# Patient Record
Sex: FEMALE | Race: BLACK OR AFRICAN AMERICAN | Employment: UNEMPLOYED | URBAN - METROPOLITAN AREA
[De-identification: names, ages, dates, MRNs, and addresses within clinical notes are randomized per-mention and may not be internally consistent; named-entity substitution may affect disease eponyms.]

---

## 2018-03-23 ENCOUNTER — OFFICE VISIT (OUTPATIENT)
Dept: FAMILY MEDICINE CLINIC | Age: 63
End: 2018-03-23
Payer: COMMERCIAL

## 2018-03-23 VITALS
HEIGHT: 63 IN | DIASTOLIC BLOOD PRESSURE: 92 MMHG | TEMPERATURE: 98.1 F | HEART RATE: 74 BPM | WEIGHT: 154.4 LBS | SYSTOLIC BLOOD PRESSURE: 170 MMHG | BODY MASS INDEX: 27.36 KG/M2 | OXYGEN SATURATION: 99 %

## 2018-03-23 DIAGNOSIS — Z12.31 ENCOUNTER FOR SCREENING MAMMOGRAM FOR BREAST CANCER: ICD-10-CM

## 2018-03-23 DIAGNOSIS — I10 ESSENTIAL (PRIMARY) HYPERTENSION: Primary | ICD-10-CM

## 2018-03-23 PROCEDURE — 99213 OFFICE O/P EST LOW 20 MIN: CPT | Performed by: PHYSICIAN ASSISTANT

## 2018-03-23 RX ORDER — CLONIDINE HYDROCHLORIDE 0.1 MG/1
0.1 TABLET ORAL ONCE
Status: COMPLETED | OUTPATIENT
Start: 2018-03-23 | End: 2018-03-23

## 2018-03-23 RX ORDER — CLONIDINE HYDROCHLORIDE 0.1 MG/1
0.1 TABLET ORAL ONCE
Status: DISCONTINUED | OUTPATIENT
Start: 2018-03-23 | End: 2018-03-23

## 2018-03-23 RX ORDER — MEDICAL SUPPLY, MISCELLANEOUS
1 EACH MISCELLANEOUS 2 TIMES DAILY
Qty: 1 EACH | Refills: 0 | Status: SHIPPED | OUTPATIENT
Start: 2018-03-23 | End: 2018-05-15

## 2018-03-23 RX ORDER — LOSARTAN POTASSIUM 25 MG/1
25 TABLET ORAL DAILY
Qty: 30 TABLET | Refills: 5 | Status: SHIPPED | OUTPATIENT
Start: 2018-03-23 | End: 2018-04-16

## 2018-03-23 RX ORDER — HYDROCHLOROTHIAZIDE 25 MG/1
25 TABLET ORAL DAILY
Qty: 30 TABLET | Refills: 3 | Status: SHIPPED | OUTPATIENT
Start: 2018-03-23 | End: 2018-08-04 | Stop reason: SDUPTHER

## 2018-03-23 RX ADMIN — CLONIDINE HYDROCHLORIDE 0.1 MG: 0.1 TABLET ORAL at 08:35

## 2018-03-23 RX ADMIN — CLONIDINE HYDROCHLORIDE 0.1 MG: 0.1 TABLET ORAL at 09:03

## 2018-03-23 NOTE — PROGRESS NOTES
[employer]  [address]   [phone]      Wolm Forth  1955  3/23/18      HPI:  Patient presents for HTN f/u. She takes losartan, but it has been more than 24 hrs since last dose. She usually works midnights, comes home at 8 am and takes her med, then goes to sleep. Sometimes she notices \"a pulse in my ears\" or mild headache. She denies sob or cp. She often has mild le edema. We reviewed her most recent labs and ekg. She clearly didn't understand anything about Htn and we spent about 10-15 minutes explaining this to her. Past Medical History:   Diagnosis Date    HTN (hypertension)         Past Surgical History:   Procedure Laterality Date    CHOLECYSTECTOMY  2012    HEMORRHOID SURGERY      PARTIAL HYSTERECTOMY  2001    TONSILLECTOMY      patient was 11years old       Current Outpatient Prescriptions   Medication Sig Dispense Refill    hydrochlorothiazide (HYDRODIURIL) 25 MG tablet Take 1 tablet by mouth daily 30 tablet 3    losartan (COZAAR) 25 MG tablet Take 1 tablet by mouth daily 30 tablet 5     No current facility-administered medications for this visit.         Allergies   Allergen Reactions    Morphine      Patient states she had a panic attack with this       Family History   Problem Relation Age of Onset    No Known Problems Mother      mother was murdered, she doesn't know much history    Lung Cancer Father      and throat ca    Other Sister      afixiated on vomit    No Known Problems Brother     No Known Problems Maternal Grandmother     No Known Problems Maternal Grandfather     No Known Problems Paternal Grandmother     No Known Problems Paternal Grandfather     COPD Sister     No Known Problems Sister     No Known Problems Brother     No Known Problems Son     No Known Problems Son     No Known Problems Daughter     No Known Problems Daughter     No Known Problems Daughter        Social History     Social History    of motion. Neck supple. No thyromegaly present. Cardiovascular: Normal rate, regular rhythm, normal heart sounds and intact distal pulses. No murmur heard. No le edema   Pulmonary/Chest: Effort normal and breath sounds normal. No accessory muscle usage. No respiratory distress. She has no wheezes. Musculoskeletal: Normal range of motion. Neurological: She is alert and oriented to person, place, and time. Skin: Skin is warm and dry. No rash noted. Psychiatric: She has a normal mood and affect. Her speech is normal and behavior is normal.         Assessment/Plan:     Rebekah Merlin was seen today for hypertension. Diagnoses and all orders for this visit:    Essential (primary) hypertension  -     cloNIDine (CATAPRES) tablet 0.1 mg; Take 1 tablet by mouth once  -     hydrochlorothiazide (HYDRODIURIL) 25 MG tablet; Take 1 tablet by mouth daily    Encounter for screening mammogram for breast cancer  -     [facility] Digital Screen Bilateral [[id number]]; Future    Screening for colon cancer  -     POCT FECAL IMMUNOCHEMICAL TEST (FIT); Future        Return in about 3 days (around 3/26/2018) for htn. we discussed HTN in depth. She will change her med schedule for better coverage when she is awake. Clonidine brought BP down somewhat. She is asymptomatic.      CIARA Rivera

## 2018-04-16 ENCOUNTER — OFFICE VISIT (OUTPATIENT)
Dept: FAMILY MEDICINE CLINIC | Age: 63
End: 2018-04-16
Payer: COMMERCIAL

## 2018-04-16 VITALS
WEIGHT: 155 LBS | DIASTOLIC BLOOD PRESSURE: 98 MMHG | RESPIRATION RATE: 16 BRPM | OXYGEN SATURATION: 94 % | TEMPERATURE: 98 F | HEIGHT: 63 IN | HEART RATE: 81 BPM | BODY MASS INDEX: 27.46 KG/M2 | SYSTOLIC BLOOD PRESSURE: 146 MMHG

## 2018-04-16 DIAGNOSIS — I10 ESSENTIAL (PRIMARY) HYPERTENSION: Primary | ICD-10-CM

## 2018-04-16 PROCEDURE — 99213 OFFICE O/P EST LOW 20 MIN: CPT | Performed by: PHYSICIAN ASSISTANT

## 2018-04-16 RX ORDER — LOSARTAN POTASSIUM 50 MG/1
50 TABLET ORAL DAILY
Qty: 30 TABLET | Refills: 3 | Status: SHIPPED | OUTPATIENT
Start: 2018-04-16 | End: 2018-05-15

## 2018-05-15 ENCOUNTER — OFFICE VISIT (OUTPATIENT)
Dept: FAMILY MEDICINE CLINIC | Age: 63
End: 2018-05-15
Payer: COMMERCIAL

## 2018-05-15 VITALS
WEIGHT: 154 LBS | HEIGHT: 63 IN | RESPIRATION RATE: 16 BRPM | HEART RATE: 84 BPM | BODY MASS INDEX: 27.29 KG/M2 | DIASTOLIC BLOOD PRESSURE: 88 MMHG | SYSTOLIC BLOOD PRESSURE: 160 MMHG

## 2018-05-15 DIAGNOSIS — Z12.39 BREAST CANCER SCREENING: ICD-10-CM

## 2018-05-15 DIAGNOSIS — I10 ESSENTIAL (PRIMARY) HYPERTENSION: Primary | ICD-10-CM

## 2018-05-15 DIAGNOSIS — Z12.11 COLON CANCER SCREENING: ICD-10-CM

## 2018-05-15 PROCEDURE — 99213 OFFICE O/P EST LOW 20 MIN: CPT | Performed by: PHYSICIAN ASSISTANT

## 2018-05-15 RX ORDER — LOSARTAN POTASSIUM 100 MG/1
100 TABLET ORAL DAILY
Qty: 30 TABLET | Refills: 3 | Status: SHIPPED | OUTPATIENT
Start: 2018-05-15 | End: 2018-09-13 | Stop reason: SDUPTHER

## 2018-08-04 DIAGNOSIS — I10 ESSENTIAL (PRIMARY) HYPERTENSION: ICD-10-CM

## 2018-08-07 RX ORDER — HYDROCHLOROTHIAZIDE 25 MG/1
25 TABLET ORAL DAILY
Qty: 30 TABLET | Refills: 0 | Status: SHIPPED | OUTPATIENT
Start: 2018-08-07 | End: 2018-09-18 | Stop reason: SDUPTHER

## 2018-09-18 ENCOUNTER — OFFICE VISIT (OUTPATIENT)
Dept: FAMILY MEDICINE CLINIC | Age: 63
End: 2018-09-18
Payer: COMMERCIAL

## 2018-09-18 VITALS
HEIGHT: 63 IN | SYSTOLIC BLOOD PRESSURE: 130 MMHG | HEART RATE: 82 BPM | BODY MASS INDEX: 26.79 KG/M2 | WEIGHT: 151.2 LBS | TEMPERATURE: 98.4 F | OXYGEN SATURATION: 98 % | DIASTOLIC BLOOD PRESSURE: 86 MMHG

## 2018-09-18 DIAGNOSIS — Z12.11 COLON CANCER SCREENING: ICD-10-CM

## 2018-09-18 DIAGNOSIS — H53.9 VISION CHANGES: ICD-10-CM

## 2018-09-18 DIAGNOSIS — Z12.31 ENCOUNTER FOR SCREENING MAMMOGRAM FOR BREAST CANCER: ICD-10-CM

## 2018-09-18 DIAGNOSIS — R76.8 HEPATITIS C ANTIBODY POSITIVE IN BLOOD: ICD-10-CM

## 2018-09-18 DIAGNOSIS — R74.8 ELEVATED LIVER ENZYMES: ICD-10-CM

## 2018-09-18 DIAGNOSIS — I10 ESSENTIAL (PRIMARY) HYPERTENSION: Primary | ICD-10-CM

## 2018-09-18 PROCEDURE — 99214 OFFICE O/P EST MOD 30 MIN: CPT | Performed by: PHYSICIAN ASSISTANT

## 2018-09-18 RX ORDER — LOSARTAN POTASSIUM 100 MG/1
100 TABLET ORAL DAILY
Qty: 30 TABLET | Refills: 5 | Status: SHIPPED | OUTPATIENT
Start: 2018-09-18 | End: 2018-10-24 | Stop reason: SDUPTHER

## 2018-09-18 RX ORDER — HYDROCHLOROTHIAZIDE 25 MG/1
25 TABLET ORAL DAILY
Qty: 30 TABLET | Refills: 5 | Status: SHIPPED | OUTPATIENT
Start: 2018-09-18 | End: 2018-10-24 | Stop reason: SDUPTHER

## 2018-09-18 ASSESSMENT — PATIENT HEALTH QUESTIONNAIRE - PHQ9
2. FEELING DOWN, DEPRESSED OR HOPELESS: 0
1. LITTLE INTEREST OR PLEASURE IN DOING THINGS: 0
SUM OF ALL RESPONSES TO PHQ QUESTIONS 1-9: 0
SUM OF ALL RESPONSES TO PHQ QUESTIONS 1-9: 0
SUM OF ALL RESPONSES TO PHQ9 QUESTIONS 1 & 2: 0

## 2018-09-18 NOTE — PROGRESS NOTES
[employer]  [address]   [phone]      Zach Palomino  1955 9/18/18      HPI:  Patient presents for htn f/u. She says \"i know I should have been here a long time ago. \" she is out of her hctz. She is having problems with blurry vision and asks to see an eye specialist. She has no hx of diabetes, but she has had uncontrolled htn for a long time. When reviewing her labs, she had a positive hep C last year. She has never seen a GI specialist. She complains about extra stress bc her daughter and kids are living with her bc of financial hardship. She only has a 1 bedroom apartment and this has been a tough living arrangement for her. She has been working extra, and this is why she hasnt done her screening exams. 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 Past Medical History:   Diagnosis Date    HTN (hypertension)         Past Surgical History:   Procedure Laterality Date    CHOLECYSTECTOMY  2012    HEMORRHOID SURGERY      PARTIAL HYSTERECTOMY  2001    TONSILLECTOMY      patient was 11years old       Current Outpatient Prescriptions   Medication Sig Dispense Refill    hydrochlorothiazide (HYDRODIURIL) 25 MG tablet Take 1 tablet by mouth daily 30 tablet 5    losartan (COZAAR) 100 MG tablet Take 1 tablet by mouth daily 30 tablet 5     No current facility-administered medications for this visit.         Allergies   Allergen Reactions    Morphine      Patient states she had a panic attack with this       Family History   Problem Relation Age of Onset    No Known Problems Mother         mother was murdered, she doesn't know much history    Lung Cancer Father         and throat ca    Other Sister         afixiated on vomit    No Known Problems Brother     No Known Problems Maternal Grandmother     No Known Problems Maternal Grandfather     No Known Problems Paternal Grandmother     No Known Problems Paternal Grandfather     COPD Sister     No Known hydrochlorothiazide (HYDRODIURIL) 25 MG tablet; Take 1 tablet by mouth daily  -     losartan (COZAAR) 100 MG tablet; Take 1 tablet by mouth daily  -     COMPREHENSIVE METABOLIC PANEL; Future  -     CBC; Future    Hepatitis C antibody positive in blood  -     Hep C Viral Load; Future  -     HEPATITIS C GENOTYPE; Future  -     [facility] DO ([place])    Elevated liver enzymes  -     [facility] ([place])    Vision changes  -     HEMOGLOBIN A1C; Future  -     Simon Prakash MD ([place])    Encounter for screening mammogram for breast cancer  -     BETSY DIGITAL SCREEN W CAD BILATERAL; Future    Colon cancer screening  -     POCT Fit Test; Future        Return in about 3 months (around 12/18/2018) for htn. htn is controlled on meds. Refills done today. Will repeat labs. Will get set up with GI for tx of Hep C and elevated LFTS. Will check glucose. I agree, she needs an eye exam. Will send referral. Trial of setting up screening exams again. Discussed importance and advised continued home breast exams as well.      CIARA Vasques

## 2018-10-24 RX ORDER — HYDROCHLOROTHIAZIDE 25 MG/1
25 TABLET ORAL DAILY
Qty: 30 TABLET | Refills: 5 | Status: SHIPPED | OUTPATIENT
Start: 2018-10-24 | End: 2019-05-03 | Stop reason: SDUPTHER

## 2018-10-24 RX ORDER — LOSARTAN POTASSIUM 100 MG/1
100 TABLET ORAL DAILY
Qty: 30 TABLET | Refills: 5 | Status: SHIPPED | OUTPATIENT
Start: 2018-10-24 | End: 2019-05-13 | Stop reason: SDUPTHER

## 2018-10-24 NOTE — TELEPHONE ENCOUNTER
Refills sent. Patient is due for colon cancer screening and mammogram.  Labs were also ordered at her last visit that have not yet been completed. Please encourage her to get this stuff done.

## 2018-10-25 ENCOUNTER — TELEPHONE (OUTPATIENT)
Dept: FAMILY MEDICINE CLINIC | Age: 63
End: 2018-10-25

## 2019-05-03 DIAGNOSIS — I10 ESSENTIAL (PRIMARY) HYPERTENSION: ICD-10-CM

## 2019-05-03 RX ORDER — HYDROCHLOROTHIAZIDE 25 MG/1
25 TABLET ORAL DAILY
Qty: 30 TABLET | Refills: 0 | Status: SHIPPED | OUTPATIENT
Start: 2019-05-03 | End: 2019-06-18 | Stop reason: SDUPTHER

## 2019-05-13 DIAGNOSIS — I10 ESSENTIAL (PRIMARY) HYPERTENSION: ICD-10-CM

## 2019-05-14 RX ORDER — LOSARTAN POTASSIUM 100 MG/1
100 TABLET ORAL DAILY
Qty: 30 TABLET | Refills: 0 | Status: SHIPPED | OUTPATIENT
Start: 2019-05-14 | End: 2019-06-18 | Stop reason: SDUPTHER

## 2019-06-18 ENCOUNTER — OFFICE VISIT (OUTPATIENT)
Dept: FAMILY MEDICINE CLINIC | Age: 64
End: 2019-06-18
Payer: COMMERCIAL

## 2019-06-18 ENCOUNTER — HOSPITAL ENCOUNTER (OUTPATIENT)
Age: 64
Discharge: HOME | End: 2019-06-20
Payer: COMMERCIAL

## 2019-06-18 VITALS
HEART RATE: 84 BPM | DIASTOLIC BLOOD PRESSURE: 76 MMHG | BODY MASS INDEX: 26.22 KG/M2 | RESPIRATION RATE: 18 BRPM | HEIGHT: 63 IN | OXYGEN SATURATION: 98 % | TEMPERATURE: 98 F | WEIGHT: 148 LBS | SYSTOLIC BLOOD PRESSURE: 122 MMHG

## 2019-06-18 DIAGNOSIS — I10 ESSENTIAL (PRIMARY) HYPERTENSION: ICD-10-CM

## 2019-06-18 DIAGNOSIS — I10 ESSENTIAL (PRIMARY) HYPERTENSION: Primary | ICD-10-CM

## 2019-06-18 DIAGNOSIS — Z12.39 BREAST CANCER SCREENING: ICD-10-CM

## 2019-06-18 DIAGNOSIS — R74.8 ELEVATED LIVER ENZYMES: ICD-10-CM

## 2019-06-18 DIAGNOSIS — R76.8 HEPATITIS C ANTIBODY POSITIVE IN BLOOD: ICD-10-CM

## 2019-06-18 DIAGNOSIS — Z12.11 COLON CANCER SCREENING: ICD-10-CM

## 2019-06-18 DIAGNOSIS — Z12.31 ENCOUNTER FOR SCREENING MAMMOGRAM FOR BREAST CANCER: ICD-10-CM

## 2019-06-18 PROCEDURE — 87522 HEPATITIS C REVRS TRNSCRPJ: CPT

## 2019-06-18 PROCEDURE — 99214 OFFICE O/P EST MOD 30 MIN: CPT | Performed by: PHYSICIAN ASSISTANT

## 2019-06-18 PROCEDURE — 80053 COMPREHEN METABOLIC PANEL: CPT

## 2019-06-18 PROCEDURE — 85025 COMPLETE CBC W/AUTO DIFF WBC: CPT

## 2019-06-18 PROCEDURE — 80061 LIPID PANEL: CPT

## 2019-06-18 RX ORDER — HYDROCHLOROTHIAZIDE 25 MG/1
25 TABLET ORAL DAILY
Qty: 30 TABLET | Refills: 3 | Status: ON HOLD | OUTPATIENT
Start: 2019-06-18 | End: 2019-09-13 | Stop reason: HOSPADM

## 2019-06-18 RX ORDER — LOSARTAN POTASSIUM 100 MG/1
100 TABLET ORAL DAILY
Qty: 30 TABLET | Refills: 3 | Status: SHIPPED | OUTPATIENT
Start: 2019-06-18 | End: 2019-11-12 | Stop reason: SDUPTHER

## 2019-06-18 ASSESSMENT — PATIENT HEALTH QUESTIONNAIRE - PHQ9
SUM OF ALL RESPONSES TO PHQ QUESTIONS 1-9: 0
1. LITTLE INTEREST OR PLEASURE IN DOING THINGS: 0
2. FEELING DOWN, DEPRESSED OR HOPELESS: 0
SUM OF ALL RESPONSES TO PHQ9 QUESTIONS 1 & 2: 0
SUM OF ALL RESPONSES TO PHQ QUESTIONS 1-9: 0

## 2019-06-18 NOTE — PROGRESS NOTES
Social History     Socioeconomic History    Marital status: Single     Spouse name: Not on file    Number of children: 11    Years of education: Not on file    Highest education level: Not on file   Occupational History    Not on file   Social Needs    Financial resource strain: Not on file    Food insecurity:     Worry: Not on file     Inability: Not on file    Transportation needs:     Medical: Not on file     Non-medical: Not on file   Tobacco Use    Smoking status: Current Every Day Smoker     Packs/day: 0.25     Years: 5.00     Pack years: 1.25    Smokeless tobacco: Never Used   Substance and Sexual Activity    Alcohol use: Yes     Comment: occasional    Drug use: No    Sexual activity: Never   Lifestyle    Physical activity:     Days per week: Not on file     Minutes per session: Not on file    Stress: Not on file   Relationships    Social connections:     Talks on phone: Not on file     Gets together: Not on file     Attends [employer] service: Not on file     Active member of club or organization: Not on file     Attends meetings of clubs or organizations: Not on file     Relationship status: Not on file    Intimate partner violence:     Fear of current or ex partner: Not on file     Emotionally abused: Not on file     Physically abused: Not on file     Forced sexual activity: Not on file   Other Topics Concern    Not on file   Social History Narrative    Not on file       Review of Systems :  Constitutional: negative for - chills, fever, weight loss, or fatigue  Psychological: negative for - anxiety, depression or suicidal ideation  HEENT: negative for - vision changes, nasal congestion, ear pain or pharyngitis   Respiratory: negative for -  Chest heaviness, cough, shortness of breath, or pleuritic pain  Cardiovascular: negative for - diaphoresis, chest pain, palpitations, or edema   Gastrointestinal: negative for -abdominal pain, change in bowel habits, constipation, diarrhea, or HEPATITIS C GENOTYPE; Future  -     HEP C VIRAL LOAD; Future  -     Ulisses Edwards DO, [place] ([place])    Elevated liver enzymes  -     Ulisses Edwards DO, [facility] ([facility])    Encounter for screening mammogram for breast cancer    Colon cancer screening  -     Cancel: POCT Fit Test; Future  -     POCT Fit Test; Future    Breast cancer screening  -     BETSY DIGITAL SCREEN W CAD BILATERAL; Future        Return in about 3 months (around 9/18/2019). htn is stable. Will obtain labs. Unfortunately she was not seen by GI yet for the hep c and elevated lfts. Will repeat labs. She does not wish to do a colonoscopy but will do a fit test. She promises to do her mammogram at the end of July and schedule a pap.      CIARA Sorensen

## 2019-06-19 LAB
ALBUMIN SERPL-MCNC: 3.9 G/DL (ref 3.5–5.2)
ALP BLD-CCNC: 60 U/L (ref 35–104)
ALT SERPL-CCNC: 106 U/L (ref 0–32)
ANION GAP SERPL CALCULATED.3IONS-SCNC: 11 MMOL/L (ref 7–16)
AST SERPL-CCNC: 122 U/L (ref 0–31)
BASOPHILS ABSOLUTE: 0.01 E9/L (ref 0–0.2)
BASOPHILS RELATIVE PERCENT: 0.3 % (ref 0–2)
BILIRUB SERPL-MCNC: 1.4 MG/DL (ref 0–1.2)
BUN BLDV-MCNC: 16 MG/DL (ref 8–23)
CALCIUM SERPL-MCNC: 11 MG/DL (ref 8.6–10.2)
CHLORIDE BLD-SCNC: 105 MMOL/L (ref 98–107)
CHOLESTEROL, TOTAL: 121 MG/DL (ref 0–199)
CO2: 27 MMOL/L (ref 22–29)
CREAT SERPL-MCNC: 1 MG/DL (ref 0.5–1)
EOSINOPHILS ABSOLUTE: 0.1 E9/L (ref 0.05–0.5)
EOSINOPHILS RELATIVE PERCENT: 2.8 % (ref 0–6)
GFR AFRICAN AMERICAN: >60
GFR NON-AFRICAN AMERICAN: >60 ML/MIN/1.73
GLUCOSE BLD-MCNC: 119 MG/DL (ref 74–99)
HCT VFR BLD CALC: 41.8 % (ref 34–48)
HDLC SERPL-MCNC: 44 MG/DL
HEMOGLOBIN: 14 G/DL (ref 11.5–15.5)
IMMATURE GRANULOCYTES #: 0.02 E9/L
IMMATURE GRANULOCYTES %: 0.6 % (ref 0–5)
LDL CHOLESTEROL CALCULATED: 56 MG/DL (ref 0–99)
LYMPHOCYTES ABSOLUTE: 1.4 E9/L (ref 1.5–4)
LYMPHOCYTES RELATIVE PERCENT: 39.3 % (ref 20–42)
MCH RBC QN AUTO: 33.3 PG (ref 26–35)
MCHC RBC AUTO-ENTMCNC: 33.5 % (ref 32–34.5)
MCV RBC AUTO: 99.5 FL (ref 80–99.9)
MONOCYTES ABSOLUTE: 0.34 E9/L (ref 0.1–0.95)
MONOCYTES RELATIVE PERCENT: 9.6 % (ref 2–12)
NEUTROPHILS ABSOLUTE: 1.69 E9/L (ref 1.8–7.3)
NEUTROPHILS RELATIVE PERCENT: 47.4 % (ref 43–80)
PDW BLD-RTO: 12.6 FL (ref 11.5–15)
PLATELET # BLD: 90 E9/L (ref 130–450)
PLATELET CONFIRMATION: NORMAL
PMV BLD AUTO: 12.9 FL (ref 7–12)
POTASSIUM SERPL-SCNC: 3.8 MMOL/L (ref 3.5–5)
RBC # BLD: 4.2 E12/L (ref 3.5–5.5)
SODIUM BLD-SCNC: 143 MMOL/L (ref 132–146)
TOTAL PROTEIN: 7.3 G/DL (ref 6.4–8.3)
TRIGL SERPL-MCNC: 104 MG/DL (ref 0–149)
VLDLC SERPL CALC-MCNC: 21 MG/DL
WBC # BLD: 3.6 E9/L (ref 4.5–11.5)

## 2019-06-22 LAB
HCV QNT BY NAAT IU/ML: ABNORMAL IU/ML
HCV QNT BY NAAT LOG IU/ML: 7 LOG IU/ML
INTERPRETATION: DETECTED

## 2019-06-25 LAB — HEPATITIS C GENOTYPE: NORMAL

## 2019-06-27 ENCOUNTER — TELEPHONE (OUTPATIENT)
Dept: FAMILY MEDICINE CLINIC | Age: 64
End: 2019-06-27

## 2019-06-27 DIAGNOSIS — R79.89 ABNORMAL CBC: Primary | ICD-10-CM

## 2019-06-27 NOTE — TELEPHONE ENCOUNTER
Patient came in because on 6/19/19 we called her regarding blood work and her platelets and white blood cell counts were low and dayo wanted to have them rechecked in one week can you place the order please

## 2019-06-28 ENCOUNTER — HOSPITAL ENCOUNTER (OUTPATIENT)
Age: 64
Discharge: HOME | End: 2019-06-30
Payer: COMMERCIAL

## 2019-06-28 ENCOUNTER — NURSE ONLY (OUTPATIENT)
Dept: FAMILY MEDICINE CLINIC | Age: 64
End: 2019-06-28

## 2019-06-28 DIAGNOSIS — R79.89 ABNORMAL CBC: ICD-10-CM

## 2019-06-28 LAB
ALBUMIN SERPL-MCNC: 4 G/DL (ref 3.5–5.2)
ALP BLD-CCNC: 75 U/L (ref 35–104)
ALT SERPL-CCNC: 121 U/L (ref 0–32)
AST SERPL-CCNC: 132 U/L (ref 0–31)
BASOPHILS ABSOLUTE: 0.01 E9/L (ref 0–0.2)
BASOPHILS RELATIVE PERCENT: 0.2 % (ref 0–2)
BILIRUB SERPL-MCNC: 1 MG/DL (ref 0–1.2)
BILIRUBIN DIRECT: 0.5 MG/DL (ref 0–0.3)
BILIRUBIN, INDIRECT: 0.5 MG/DL (ref 0–1)
EOSINOPHILS ABSOLUTE: 0.14 E9/L (ref 0.05–0.5)
EOSINOPHILS RELATIVE PERCENT: 3.4 % (ref 0–6)
HCT VFR BLD CALC: 41.4 % (ref 34–48)
HEMOGLOBIN: 14.7 G/DL (ref 11.5–15.5)
IMMATURE GRANULOCYTES #: 0.02 E9/L
IMMATURE GRANULOCYTES %: 0.5 % (ref 0–5)
INR BLD: 1.1
LYMPHOCYTES ABSOLUTE: 1.73 E9/L (ref 1.5–4)
LYMPHOCYTES RELATIVE PERCENT: 42.5 % (ref 20–42)
MCH RBC QN AUTO: 33.6 PG (ref 26–35)
MCHC RBC AUTO-ENTMCNC: 35.5 % (ref 32–34.5)
MCV RBC AUTO: 94.5 FL (ref 80–99.9)
MONOCYTES ABSOLUTE: 0.45 E9/L (ref 0.1–0.95)
MONOCYTES RELATIVE PERCENT: 11.1 % (ref 2–12)
NEUTROPHILS ABSOLUTE: 1.72 E9/L (ref 1.8–7.3)
NEUTROPHILS RELATIVE PERCENT: 42.3 % (ref 43–80)
PDW BLD-RTO: 12.5 FL (ref 11.5–15)
PLATELET # BLD: 90 E9/L (ref 130–450)
PLATELET CONFIRMATION: NORMAL
PMV BLD AUTO: 13.1 FL (ref 7–12)
PROTHROMBIN TIME: 13 SEC (ref 9.3–12.4)
RBC # BLD: 4.38 E12/L (ref 3.5–5.5)
RBC # BLD: NORMAL 10*6/UL
TOTAL PROTEIN: 7.3 G/DL (ref 6.4–8.3)
WBC # BLD: 4.1 E9/L (ref 4.5–11.5)

## 2019-06-28 PROCEDURE — 81596 NFCT DS CHRNC HCV 6 ASSAYS: CPT

## 2019-06-28 PROCEDURE — 86704 HEP B CORE ANTIBODY TOTAL: CPT

## 2019-06-28 PROCEDURE — 85610 PROTHROMBIN TIME: CPT

## 2019-06-28 PROCEDURE — 87340 HEPATITIS B SURFACE AG IA: CPT

## 2019-06-28 PROCEDURE — 80076 HEPATIC FUNCTION PANEL: CPT

## 2019-06-28 PROCEDURE — 86706 HEP B SURFACE ANTIBODY: CPT

## 2019-06-28 PROCEDURE — 86703 HIV-1/HIV-2 1 RESULT ANTBDY: CPT

## 2019-06-28 PROCEDURE — 85025 COMPLETE CBC W/AUTO DIFF WBC: CPT

## 2019-06-28 PROCEDURE — 87522 HEPATITIS C REVRS TRNSCRPJ: CPT

## 2019-06-28 PROCEDURE — 86803 HEPATITIS C AB TEST: CPT

## 2019-07-01 LAB
HBV SURFACE AB TITR SER: NORMAL {TITER}
HEPATITIS B SURFACE ANTIGEN INTERPRETATION: NORMAL
HEPATITIS C ANTIBODY INTERPRETATION: REACTIVE
HIV-1 AND HIV-2 ANTIBODIES: NORMAL

## 2019-07-03 LAB — HEPATITIS B CORE TOTAL ANTIBODY: REACTIVE

## 2019-07-12 ENCOUNTER — TELEPHONE (OUTPATIENT)
Dept: FAMILY MEDICINE CLINIC | Age: 64
End: 2019-07-12

## 2019-07-17 LAB
Lab: NORMAL
REPORT: NORMAL
THIS TEST SENT TO: NORMAL

## 2019-07-18 ENCOUNTER — HOSPITAL ENCOUNTER (OUTPATIENT)
Age: 64
Discharge: HOME | End: 2019-07-18
Payer: COMMERCIAL

## 2019-07-18 LAB
ALCOHOL URINE: NOT DETECTED MG/DL
AMPHETAMINE SCREEN, URINE: NOT DETECTED
BARBITURATE SCREEN URINE: NOT DETECTED
BENZODIAZEPINE SCREEN, URINE: NOT DETECTED
CANNABINOID SCREEN URINE: NOT DETECTED
COCAINE METABOLITE SCREEN URINE: NOT DETECTED
Lab: NORMAL
METHADONE SCREEN, URINE: NOT DETECTED
OPIATE SCREEN URINE: NOT DETECTED
PHENCYCLIDINE SCREEN URINE: NOT DETECTED
PROPOXYPHENE SCREEN: NOT DETECTED
REPORT: NORMAL
THIS TEST SENT TO: NORMAL

## 2019-07-18 PROCEDURE — 80307 DRUG TEST PRSMV CHEM ANLYZR: CPT

## 2019-09-11 ENCOUNTER — HOSPITAL ENCOUNTER (OUTPATIENT)
Age: 64
Discharge: HOME | DRG: 641 | End: 2019-09-13
Payer: COMMERCIAL

## 2019-09-11 ENCOUNTER — OFFICE VISIT (OUTPATIENT)
Dept: FAMILY MEDICINE CLINIC | Age: 64
End: 2019-09-11
Payer: COMMERCIAL

## 2019-09-11 ENCOUNTER — HOSPITAL ENCOUNTER (INPATIENT)
Age: 64
LOS: 1 days | Discharge: HOME | DRG: 641 | End: 2019-09-13
Attending: EMERGENCY MEDICINE | Admitting: INTERNAL MEDICINE
Payer: COMMERCIAL

## 2019-09-11 VITALS
RESPIRATION RATE: 16 BRPM | HEIGHT: 63 IN | OXYGEN SATURATION: 97 % | SYSTOLIC BLOOD PRESSURE: 122 MMHG | DIASTOLIC BLOOD PRESSURE: 74 MMHG | BODY MASS INDEX: 23.67 KG/M2 | HEART RATE: 88 BPM | WEIGHT: 133.6 LBS | TEMPERATURE: 98 F

## 2019-09-11 DIAGNOSIS — Z86.19 HISTORY OF HEPATITIS C: ICD-10-CM

## 2019-09-11 DIAGNOSIS — R11.11 INTRACTABLE VOMITING WITHOUT NAUSEA, UNSPECIFIED VOMITING TYPE: ICD-10-CM

## 2019-09-11 DIAGNOSIS — E80.6 HYPERBILIRUBINEMIA: ICD-10-CM

## 2019-09-11 DIAGNOSIS — R53.83 FATIGUE, UNSPECIFIED TYPE: Primary | ICD-10-CM

## 2019-09-11 DIAGNOSIS — R53.83 FATIGUE, UNSPECIFIED TYPE: ICD-10-CM

## 2019-09-11 DIAGNOSIS — R17 SCLERAL ICTERUS: ICD-10-CM

## 2019-09-11 DIAGNOSIS — N17.9 AKI (ACUTE KIDNEY INJURY) (HCC): ICD-10-CM

## 2019-09-11 DIAGNOSIS — E83.52 HYPERCALCEMIA: Primary | ICD-10-CM

## 2019-09-11 LAB
ALBUMIN SERPL-MCNC: 4 G/DL (ref 3.5–5.2)
ALBUMIN SERPL-MCNC: 4.3 G/DL (ref 3.5–5.2)
ALP BLD-CCNC: 102 U/L (ref 35–104)
ALP BLD-CCNC: 80 U/L (ref 35–104)
ALT SERPL-CCNC: 33 U/L (ref 0–32)
ALT SERPL-CCNC: 36 U/L (ref 0–32)
ANION GAP SERPL CALCULATED.3IONS-SCNC: 14 MMOL/L (ref 7–16)
ANION GAP SERPL CALCULATED.3IONS-SCNC: 19 MMOL/L (ref 7–16)
APTT: 22.3 SEC (ref 24.5–35.1)
AST SERPL-CCNC: 34 U/L (ref 0–31)
AST SERPL-CCNC: 53 U/L (ref 0–31)
BASOPHILS ABSOLUTE: 0.02 E9/L (ref 0–0.2)
BASOPHILS ABSOLUTE: 0.04 E9/L (ref 0–0.2)
BASOPHILS RELATIVE PERCENT: 0.3 % (ref 0–2)
BASOPHILS RELATIVE PERCENT: 0.6 % (ref 0–2)
BILIRUB SERPL-MCNC: 10 MG/DL (ref 0–1.2)
BILIRUB SERPL-MCNC: 8.8 MG/DL (ref 0–1.2)
BUN BLDV-MCNC: 22 MG/DL (ref 8–23)
BUN BLDV-MCNC: 26 MG/DL (ref 8–23)
CALCIUM SERPL-MCNC: 13.3 MG/DL (ref 8.6–10.2)
CALCIUM SERPL-MCNC: 14 MG/DL (ref 8.6–10.2)
CHLORIDE BLD-SCNC: 97 MMOL/L (ref 98–107)
CHLORIDE BLD-SCNC: 98 MMOL/L (ref 98–107)
CO2: 23 MMOL/L (ref 22–29)
CO2: 25 MMOL/L (ref 22–29)
CREAT SERPL-MCNC: 1.3 MG/DL (ref 0.5–1)
CREAT SERPL-MCNC: 1.5 MG/DL (ref 0.5–1)
EOSINOPHILS ABSOLUTE: 0.03 E9/L (ref 0.05–0.5)
EOSINOPHILS ABSOLUTE: 0.06 E9/L (ref 0.05–0.5)
EOSINOPHILS RELATIVE PERCENT: 0.5 % (ref 0–6)
EOSINOPHILS RELATIVE PERCENT: 0.9 % (ref 0–6)
GFR AFRICAN AMERICAN: 42
GFR AFRICAN AMERICAN: 50
GFR NON-AFRICAN AMERICAN: 42 ML/MIN/1.73
GFR NON-AFRICAN AMERICAN: 50 ML/MIN/1.73
GLUCOSE BLD-MCNC: 114 MG/DL (ref 74–99)
GLUCOSE BLD-MCNC: 139 MG/DL (ref 74–99)
HCT VFR BLD CALC: 43.5 % (ref 34–48)
HCT VFR BLD CALC: 49.9 % (ref 34–48)
HEMOGLOBIN: 15.7 G/DL (ref 11.5–15.5)
HEMOGLOBIN: 17.6 G/DL (ref 11.5–15.5)
IMMATURE GRANULOCYTES #: 0.02 E9/L
IMMATURE GRANULOCYTES #: 0.02 E9/L
IMMATURE GRANULOCYTES %: 0.3 % (ref 0–5)
IMMATURE GRANULOCYTES %: 0.3 % (ref 0–5)
INR BLD: 1
LACTIC ACID: 2.4 MMOL/L (ref 0.5–2.2)
LIPASE: 27 U/L (ref 13–60)
LYMPHOCYTES ABSOLUTE: 1.54 E9/L (ref 1.5–4)
LYMPHOCYTES ABSOLUTE: 1.99 E9/L (ref 1.5–4)
LYMPHOCYTES RELATIVE PERCENT: 24.9 % (ref 20–42)
LYMPHOCYTES RELATIVE PERCENT: 29.5 % (ref 20–42)
MCH RBC QN AUTO: 34.1 PG (ref 26–35)
MCH RBC QN AUTO: 34.5 PG (ref 26–35)
MCHC RBC AUTO-ENTMCNC: 35.3 % (ref 32–34.5)
MCHC RBC AUTO-ENTMCNC: 36.1 % (ref 32–34.5)
MCV RBC AUTO: 95.6 FL (ref 80–99.9)
MCV RBC AUTO: 96.7 FL (ref 80–99.9)
MONOCYTES ABSOLUTE: 0.65 E9/L (ref 0.1–0.95)
MONOCYTES ABSOLUTE: 0.94 E9/L (ref 0.1–0.95)
MONOCYTES RELATIVE PERCENT: 10.5 % (ref 2–12)
MONOCYTES RELATIVE PERCENT: 13.9 % (ref 2–12)
NEUTROPHILS ABSOLUTE: 3.69 E9/L (ref 1.8–7.3)
NEUTROPHILS ABSOLUTE: 3.92 E9/L (ref 1.8–7.3)
NEUTROPHILS RELATIVE PERCENT: 54.8 % (ref 43–80)
NEUTROPHILS RELATIVE PERCENT: 63.5 % (ref 43–80)
PDW BLD-RTO: 12.8 FL (ref 11.5–15)
PDW BLD-RTO: 12.8 FL (ref 11.5–15)
PLATELET # BLD: 122 E9/L (ref 130–450)
PLATELET # BLD: 156 E9/L (ref 130–450)
PMV BLD AUTO: 11.5 FL (ref 7–12)
PMV BLD AUTO: 12.3 FL (ref 7–12)
POTASSIUM SERPL-SCNC: 3.6 MMOL/L (ref 3.5–5)
POTASSIUM SERPL-SCNC: 3.9 MMOL/L (ref 3.5–5)
PROTHROMBIN TIME: 11 SEC (ref 9.3–12.4)
RBC # BLD: 4.55 E12/L (ref 3.5–5.5)
RBC # BLD: 5.16 E12/L (ref 3.5–5.5)
REASON FOR REJECTION: NORMAL
REASON FOR REJECTION: NORMAL
REJECTED TEST: NORMAL
REJECTED TEST: NORMAL
SODIUM BLD-SCNC: 136 MMOL/L (ref 132–146)
SODIUM BLD-SCNC: 140 MMOL/L (ref 132–146)
TOTAL PROTEIN: 8.2 G/DL (ref 6.4–8.3)
TOTAL PROTEIN: 8.8 G/DL (ref 6.4–8.3)
TROPONIN: <0.01 NG/ML (ref 0–0.03)
WBC # BLD: 6.2 E9/L (ref 4.5–11.5)
WBC # BLD: 6.7 E9/L (ref 4.5–11.5)

## 2019-09-11 PROCEDURE — 83690 ASSAY OF LIPASE: CPT

## 2019-09-11 PROCEDURE — 80053 COMPREHEN METABOLIC PANEL: CPT

## 2019-09-11 PROCEDURE — 94761 N-INVAS EAR/PLS OXIMETRY MLT: CPT

## 2019-09-11 PROCEDURE — 85610 PROTHROMBIN TIME: CPT

## 2019-09-11 PROCEDURE — 84484 ASSAY OF TROPONIN QUANT: CPT

## 2019-09-11 PROCEDURE — 85025 COMPLETE CBC W/AUTO DIFF WBC: CPT

## 2019-09-11 PROCEDURE — 93005 ELECTROCARDIOGRAM TRACING: CPT | Performed by: NURSE PRACTITIONER

## 2019-09-11 PROCEDURE — 83605 ASSAY OF LACTIC ACID: CPT

## 2019-09-11 PROCEDURE — 99213 OFFICE O/P EST LOW 20 MIN: CPT | Performed by: FAMILY MEDICINE

## 2019-09-11 PROCEDURE — 85730 THROMBOPLASTIN TIME PARTIAL: CPT

## 2019-09-11 PROCEDURE — 99285 EMERGENCY DEPT VISIT HI MDM: CPT

## 2019-09-11 PROCEDURE — 36415 COLL VENOUS BLD VENIPUNCTURE: CPT

## 2019-09-11 ASSESSMENT — ENCOUNTER SYMPTOMS
VOMITING: 1
SHORTNESS OF BREATH: 0
WHEEZING: 0
DIARRHEA: 0
NAUSEA: 1
COUGH: 0
SINUS PRESSURE: 0
SORE THROAT: 0
ABDOMINAL PAIN: 0
CONSTIPATION: 0
BACK PAIN: 0
RHINORRHEA: 0

## 2019-09-11 NOTE — PATIENT INSTRUCTIONS
Care Instructions. \"     If you do not have an account, please click on the \"Sign Up Now\" link. Current as of: September 23, 2018  Content Version: 12.1  © 7607-8658 [employer]. Care instructions adapted under license by [facility] [place] ([facility]). If you have questions about a medical condition or this instruction, always ask your healthcare professional. [address] any warranty or liability for your use of this information.

## 2019-09-12 ENCOUNTER — APPOINTMENT (OUTPATIENT)
Dept: CT IMAGING | Age: 64
DRG: 641 | End: 2019-09-12
Payer: COMMERCIAL

## 2019-09-12 PROBLEM — B18.2 CHRONIC HEPATITIS C WITHOUT HEPATIC COMA (HCC): Status: ACTIVE | Noted: 2019-09-12

## 2019-09-12 PROBLEM — E83.52 HYPERCALCEMIA: Status: ACTIVE | Noted: 2019-09-12

## 2019-09-12 PROBLEM — R82.994 HYPERCALCIURIA, IDIOPATHIC: Status: ACTIVE | Noted: 2019-09-12

## 2019-09-12 PROBLEM — R82.994 HYPERCALCIURIA, IDIOPATHIC: Status: RESOLVED | Noted: 2019-09-12 | Resolved: 2019-09-12

## 2019-09-12 PROBLEM — I10 ESSENTIAL HYPERTENSION: Chronic | Status: ACTIVE | Noted: 2019-09-12

## 2019-09-12 PROBLEM — N17.9 ACUTE KIDNEY INJURY (HCC): Status: ACTIVE | Noted: 2019-09-12

## 2019-09-12 PROBLEM — N28.9 ACUTE RENAL INSUFFICIENCY: Status: RESOLVED | Noted: 2019-09-12 | Resolved: 2019-09-12

## 2019-09-12 PROBLEM — N28.9 ACUTE RENAL INSUFFICIENCY: Status: ACTIVE | Noted: 2019-09-12

## 2019-09-12 PROBLEM — E44.0 MODERATE PROTEIN-CALORIE MALNUTRITION (HCC): Chronic | Status: ACTIVE | Noted: 2019-09-12

## 2019-09-12 LAB
ANION GAP SERPL CALCULATED.3IONS-SCNC: 10 MMOL/L (ref 7–16)
BACTERIA: ABNORMAL /HPF
BASOPHILS ABSOLUTE: 0.01 E9/L (ref 0–0.2)
BASOPHILS RELATIVE PERCENT: 0.2 % (ref 0–2)
BILIRUBIN URINE: ABNORMAL
BLOOD, URINE: NEGATIVE
BUN BLDV-MCNC: 26 MG/DL (ref 8–23)
CALCIUM IONIZED: 1.6 MMOL/L (ref 1.15–1.33)
CALCIUM SERPL-MCNC: 11.1 MG/DL (ref 8.6–10.2)
CASTS: ABNORMAL /LPF
CHLORIDE BLD-SCNC: 104 MMOL/L (ref 98–107)
CHLORIDE URINE RANDOM: 62 MMOL/L
CLARITY: CLEAR
CO2: 27 MMOL/L (ref 22–29)
COLOR: ABNORMAL
CREAT SERPL-MCNC: 1.3 MG/DL (ref 0.5–1)
CREATININE URINE: 139 MG/DL (ref 29–226)
EKG ATRIAL RATE: 79 BPM
EKG P AXIS: 41 DEGREES
EKG P-R INTERVAL: 140 MS
EKG Q-T INTERVAL: 428 MS
EKG QRS DURATION: 92 MS
EKG QTC CALCULATION (BAZETT): 490 MS
EKG R AXIS: 4 DEGREES
EKG T AXIS: 57 DEGREES
EKG VENTRICULAR RATE: 79 BPM
EOSINOPHILS ABSOLUTE: 0.07 E9/L (ref 0.05–0.5)
EOSINOPHILS RELATIVE PERCENT: 1.5 % (ref 0–6)
EPITHELIAL CELLS, UA: ABNORMAL /HPF
GFR AFRICAN AMERICAN: 50
GFR NON-AFRICAN AMERICAN: 50 ML/MIN/1.73
GLUCOSE BLD-MCNC: 113 MG/DL (ref 74–99)
GLUCOSE URINE: NEGATIVE MG/DL
HCT VFR BLD CALC: 38.8 % (ref 34–48)
HEMOGLOBIN: 13.9 G/DL (ref 11.5–15.5)
IMMATURE GRANULOCYTES #: 0.01 E9/L
IMMATURE GRANULOCYTES %: 0.2 % (ref 0–5)
KETONES, URINE: NEGATIVE MG/DL
LEUKOCYTE ESTERASE, URINE: ABNORMAL
LYMPHOCYTES ABSOLUTE: 1.49 E9/L (ref 1.5–4)
LYMPHOCYTES RELATIVE PERCENT: 32.1 % (ref 20–42)
MAGNESIUM: 1.9 MG/DL (ref 1.6–2.6)
MCH RBC QN AUTO: 34.2 PG (ref 26–35)
MCHC RBC AUTO-ENTMCNC: 35.8 % (ref 32–34.5)
MCV RBC AUTO: 95.6 FL (ref 80–99.9)
MONOCYTES ABSOLUTE: 0.77 E9/L (ref 0.1–0.95)
MONOCYTES RELATIVE PERCENT: 16.6 % (ref 2–12)
NEUTROPHILS ABSOLUTE: 2.29 E9/L (ref 1.8–7.3)
NEUTROPHILS RELATIVE PERCENT: 49.4 % (ref 43–80)
NITRITE, URINE: NEGATIVE
OSMOLALITY URINE: 509 MOSM/KG (ref 300–900)
PARATHYROID HORMONE INTACT: 175 PG/ML (ref 15–65)
PDW BLD-RTO: 12.5 FL (ref 11.5–15)
PH UA: 6 (ref 5–9)
PHOSPHORUS: 1.3 MG/DL (ref 2.5–4.5)
PLATELET # BLD: 104 E9/L (ref 130–450)
PMV BLD AUTO: 11.3 FL (ref 7–12)
POTASSIUM SERPL-SCNC: 2.9 MMOL/L (ref 3.5–5)
PROTEIN PROTEIN: 25 MG/DL (ref 0–12)
PROTEIN UA: ABNORMAL MG/DL
PROTEIN/CREAT RATIO: 0.2
PROTEIN/CREAT RATIO: 0.2 (ref 0–0.2)
RBC # BLD: 4.06 E12/L (ref 3.5–5.5)
RBC UA: ABNORMAL /HPF (ref 0–2)
SODIUM BLD-SCNC: 141 MMOL/L (ref 132–146)
SODIUM URINE: 43 MMOL/L
SPECIFIC GRAVITY UA: 1.02 (ref 1–1.03)
T4 FREE: 1.52 NG/DL (ref 0.93–1.7)
TSH SERPL DL<=0.05 MIU/L-ACNC: 0.51 UIU/ML (ref 0.27–4.2)
UROBILINOGEN, URINE: >=8 E.U./DL
WBC # BLD: 4.6 E9/L (ref 4.5–11.5)
WBC UA: ABNORMAL /HPF (ref 0–5)

## 2019-09-12 PROCEDURE — 36415 COLL VENOUS BLD VENIPUNCTURE: CPT

## 2019-09-12 PROCEDURE — 82436 ASSAY OF URINE CHLORIDE: CPT

## 2019-09-12 PROCEDURE — 80048 BASIC METABOLIC PNL TOTAL CA: CPT

## 2019-09-12 PROCEDURE — 82542 COL CHROMOTOGRAPHY QUAL/QUAN: CPT

## 2019-09-12 PROCEDURE — 6360000002 HC RX W HCPCS: Performed by: NURSE PRACTITIONER

## 2019-09-12 PROCEDURE — 81001 URINALYSIS AUTO W/SCOPE: CPT

## 2019-09-12 PROCEDURE — 6360000002 HC RX W HCPCS: Performed by: INTERNAL MEDICINE

## 2019-09-12 PROCEDURE — 83970 ASSAY OF PARATHORMONE: CPT

## 2019-09-12 PROCEDURE — 6370000000 HC RX 637 (ALT 250 FOR IP): Performed by: INTERNAL MEDICINE

## 2019-09-12 PROCEDURE — 2060000000 HC ICU INTERMEDIATE R&B

## 2019-09-12 PROCEDURE — 2580000003 HC RX 258: Performed by: NURSE PRACTITIONER

## 2019-09-12 PROCEDURE — 84443 ASSAY THYROID STIM HORMONE: CPT

## 2019-09-12 PROCEDURE — 2580000003 HC RX 258: Performed by: INTERNAL MEDICINE

## 2019-09-12 PROCEDURE — 2500000003 HC RX 250 WO HCPCS: Performed by: INTERNAL MEDICINE

## 2019-09-12 PROCEDURE — 96361 HYDRATE IV INFUSION ADD-ON: CPT

## 2019-09-12 PROCEDURE — 85025 COMPLETE CBC W/AUTO DIFF WBC: CPT

## 2019-09-12 PROCEDURE — 83935 ASSAY OF URINE OSMOLALITY: CPT

## 2019-09-12 PROCEDURE — 96372 THER/PROPH/DIAG INJ SC/IM: CPT

## 2019-09-12 PROCEDURE — 84100 ASSAY OF PHOSPHORUS: CPT

## 2019-09-12 PROCEDURE — 96374 THER/PROPH/DIAG INJ IV PUSH: CPT

## 2019-09-12 PROCEDURE — 82330 ASSAY OF CALCIUM: CPT

## 2019-09-12 PROCEDURE — 84156 ASSAY OF PROTEIN URINE: CPT

## 2019-09-12 PROCEDURE — 74176 CT ABD & PELVIS W/O CONTRAST: CPT

## 2019-09-12 PROCEDURE — 82570 ASSAY OF URINE CREATININE: CPT

## 2019-09-12 PROCEDURE — 83735 ASSAY OF MAGNESIUM: CPT

## 2019-09-12 PROCEDURE — 84300 ASSAY OF URINE SODIUM: CPT

## 2019-09-12 PROCEDURE — 84439 ASSAY OF FREE THYROXINE: CPT

## 2019-09-12 PROCEDURE — G0378 HOSPITAL OBSERVATION PER HR: HCPCS

## 2019-09-12 PROCEDURE — 93010 ELECTROCARDIOGRAM REPORT: CPT | Performed by: INTERNAL MEDICINE

## 2019-09-12 RX ORDER — SODIUM CHLORIDE 0.9 % (FLUSH) 0.9 %
10 SYRINGE (ML) INJECTION PRN
Status: DISCONTINUED | OUTPATIENT
Start: 2019-09-12 | End: 2019-09-13 | Stop reason: HOSPADM

## 2019-09-12 RX ORDER — SODIUM CHLORIDE 9 MG/ML
INJECTION, SOLUTION INTRAVENOUS CONTINUOUS
Status: DISCONTINUED | OUTPATIENT
Start: 2019-09-12 | End: 2019-09-12

## 2019-09-12 RX ORDER — POTASSIUM CHLORIDE 7.45 MG/ML
10 INJECTION INTRAVENOUS PRN
Status: DISCONTINUED | OUTPATIENT
Start: 2019-09-12 | End: 2019-09-13 | Stop reason: HOSPADM

## 2019-09-12 RX ORDER — ONDANSETRON 2 MG/ML
4 INJECTION INTRAMUSCULAR; INTRAVENOUS EVERY 6 HOURS PRN
Status: DISCONTINUED | OUTPATIENT
Start: 2019-09-12 | End: 2019-09-13 | Stop reason: HOSPADM

## 2019-09-12 RX ORDER — SODIUM CHLORIDE 0.9 % (FLUSH) 0.9 %
10 SYRINGE (ML) INJECTION EVERY 12 HOURS SCHEDULED
Status: DISCONTINUED | OUTPATIENT
Start: 2019-09-12 | End: 2019-09-13 | Stop reason: HOSPADM

## 2019-09-12 RX ORDER — POTASSIUM CHLORIDE 20 MEQ/1
40 TABLET, EXTENDED RELEASE ORAL PRN
Status: DISCONTINUED | OUTPATIENT
Start: 2019-09-12 | End: 2019-09-13 | Stop reason: HOSPADM

## 2019-09-12 RX ORDER — MAGNESIUM SULFATE 1 G/100ML
1 INJECTION INTRAVENOUS PRN
Status: DISCONTINUED | OUTPATIENT
Start: 2019-09-12 | End: 2019-09-13 | Stop reason: HOSPADM

## 2019-09-12 RX ORDER — SODIUM CHLORIDE 9 MG/ML
INJECTION, SOLUTION INTRAVENOUS CONTINUOUS
Status: ACTIVE | OUTPATIENT
Start: 2019-09-12 | End: 2019-09-12

## 2019-09-12 RX ADMIN — CEFTRIAXONE SODIUM 1 G: 1 INJECTION, POWDER, FOR SOLUTION INTRAMUSCULAR; INTRAVENOUS at 08:52

## 2019-09-12 RX ADMIN — POTASSIUM CHLORIDE 40 MEQ: 1500 TABLET, EXTENDED RELEASE ORAL at 22:15

## 2019-09-12 RX ADMIN — POTASSIUM PHOSPHATE, MONOBASIC AND POTASSIUM PHOSPHATE, DIBASIC 30 MMOL: 224; 236 INJECTION, SOLUTION, CONCENTRATE INTRAVENOUS at 19:50

## 2019-09-12 RX ADMIN — SODIUM CHLORIDE: 9 INJECTION, SOLUTION INTRAVENOUS at 13:28

## 2019-09-12 RX ADMIN — SODIUM CHLORIDE: 9 INJECTION, SOLUTION INTRAVENOUS at 07:16

## 2019-09-12 RX ADMIN — ENOXAPARIN SODIUM 40 MG: 40 INJECTION SUBCUTANEOUS at 08:52

## 2019-09-12 ASSESSMENT — PAIN SCALES - GENERAL
PAINLEVEL_OUTOF10: 0

## 2019-09-12 NOTE — ED PROVIDER NOTES
Department of Emergency Medicine   ED  Provider Note  Admit Date/RoomTime: 9/11/2019  8:20 PM  ED Room: 8509/8509-A      History of Present Illness:  9/11/19, Time: 9:11 PM         Trinh Bowers is a 61 y.o. female presenting to the ED for abnormal lab, beginning today. The complaint has been persistent, mild in severity, and worsened by nothing. Pt with history of HTN, and hepatitis C presents per Dr. Khoi Poole after her creatinine was elevated and pt was hypercalemic as noted on her labs. Pt states she has been receiving treatments for her hepatitis C and her last treatment was 1 week ago. Pt complains of nausea, vomiting, fatigue, chills, and SOB. She reports she hasn't really been eating regularly and her last full meal was a few days ago. Pt denies fever, lightheadedness, hematochezia, diarrhea, dysuria, constipation, vaginal bleeding or discharge, headache, cough. Review of Systems:   Pertinent positives and negatives are stated within HPI, all other systems reviewed and are negative.    --------------------------------------------- PAST HISTORY ---------------------------------------------  Past Medical History:  has a past medical history of HTN (hypertension). Past Surgical History:  has a past surgical history that includes Cholecystectomy (2012); partial hysterectomy (cervix not removed) (2001); Tonsillectomy; and Hemorrhoid surgery. Social History:  reports that she has been smoking. She has a 1.25 pack-year smoking history. She has never used smokeless tobacco. She reports that she drinks alcohol. She reports that she does not use drugs. Family History: family history includes COPD in her sister; Alvarez Kimmy in her father; No Known Problems in her brother, brother, daughter, daughter, daughter, maternal grandfather, maternal grandmother, mother, paternal grandfather, paternal grandmother, sister, son, and son; Other in her sister.      The patients home medications have been

## 2019-09-12 NOTE — PROGRESS NOTES
Messaged Dr Shraddha Otoole regarding new consult for hypercalemia.         0908: Spoke ot Dr Shraddha Otoole regarding patient and he stated to keep fluids running at current rate, will see later today. Informed him that patient may have new bed on intermediate floor.

## 2019-09-12 NOTE — ED NOTES
Pt does not have a line. ED physician aware. OK to wait for IV team as several attempts have been made in ED including using US.       Norberto Santos RN  09/12/19 [id number]

## 2019-09-12 NOTE — PROGRESS NOTES
Nutrition Assessment    Type and Reason for Visit: Initial, Positive Nutrition Screen    Nutrition Recommendations: Continue current diet, Start ONS (SB ensure daily)    Nutrition Assessment: Pt moderately malnourished as evidenced by mild fat/muscle wasting. Pt at risk for further compromise 2/2 reported poor PO intakes d/t abdominal pain/nausea with a hx of chronic hepatitis C. Will add Ensure daily. Malnutrition Assessment:  · Malnutrition Status: Meets the criteria for moderate malnutrition  · Context: Chronic illness  · Findings of the 6 clinical characteristics of malnutrition (Minimum of 2 out of 6 clinical characteristics is required to make the diagnosis of moderate or severe Protein Calorie Malnutrition based on AND/ASPEN Guidelines):  1. Energy Intake-Less than or equal to 75% of estimated energy requirement, (x 3 days per pt)    2. Weight Loss-No significant weight loss    3. Fat Loss-Mild subcutaneous fat loss, Orbital  4. Muscle Loss-Mild muscle mass loss, Clavicles (pectoralis and deltoids)  5. Fluid Accumulation-No significant fluid accumulation    6.  Strength-Not measured    Nutrition Risk Level: Moderate    Nutrient Needs:  · Estimated Daily Total Kcal: 1436-9449(28-30 kcals/kg CBW)  · Estimated Daily Protein (g): 65-75(1.2-1.4 g/kg CBW)  · Estimated Daily Total Fluid (ml/day): 0601-4318(1 ml/kcal)    Nutrition Diagnosis:   · Problem: In context of chronic illness, Moderate malnutrition  · Etiology: related to Alteration in GI function     Signs and symptoms:  as evidenced by Diet history of poor intake, Intake 50-75%, Mild loss of subcutaneous fat, Moderate muscle loss, Nausea    Objective Information:  · Nutrition-Focused Physical Findings: A&O, c/o abd. pain/nausea, abd. WDL, +BS, no I/O data to assess, edema WDL, ANGELO, Chronic Hep.  C  · Wound Type: None  · Current Nutrition Therapies:  · Oral Diet Orders: General   · Oral Diet intake: 51-75%(per pt)  · Oral Nutrition Supplement

## 2019-09-13 ENCOUNTER — APPOINTMENT (OUTPATIENT)
Dept: NUCLEAR MEDICINE | Age: 64
DRG: 641 | End: 2019-09-13
Payer: COMMERCIAL

## 2019-09-13 VITALS
HEIGHT: 63 IN | HEART RATE: 67 BPM | SYSTOLIC BLOOD PRESSURE: 145 MMHG | TEMPERATURE: 98.3 F | OXYGEN SATURATION: 97 % | BODY MASS INDEX: 22.15 KG/M2 | DIASTOLIC BLOOD PRESSURE: 80 MMHG | WEIGHT: 125 LBS | RESPIRATION RATE: 20 BRPM

## 2019-09-13 LAB
ANION GAP SERPL CALCULATED.3IONS-SCNC: 10 MMOL/L (ref 7–16)
BUN BLDV-MCNC: 21 MG/DL (ref 8–23)
CALCIUM SERPL-MCNC: 10.6 MG/DL (ref 8.6–10.2)
CHLORIDE BLD-SCNC: 108 MMOL/L (ref 98–107)
CO2: 23 MMOL/L (ref 22–29)
CREAT SERPL-MCNC: 1 MG/DL (ref 0.5–1)
GFR AFRICAN AMERICAN: >60
GFR NON-AFRICAN AMERICAN: >60 ML/MIN/1.73
GLUCOSE BLD-MCNC: 90 MG/DL (ref 74–99)
HCT VFR BLD CALC: 38.3 % (ref 34–48)
HEMOGLOBIN: 13.5 G/DL (ref 11.5–15.5)
MAGNESIUM: 1.8 MG/DL (ref 1.6–2.6)
MCH RBC QN AUTO: 34.1 PG (ref 26–35)
MCHC RBC AUTO-ENTMCNC: 35.2 % (ref 32–34.5)
MCV RBC AUTO: 96.7 FL (ref 80–99.9)
PDW BLD-RTO: 12.5 FL (ref 11.5–15)
PHOSPHORUS: 2.2 MG/DL (ref 2.5–4.5)
PLATELET # BLD: 92 E9/L (ref 130–450)
PLATELET CONFIRMATION: NORMAL
PMV BLD AUTO: 11.6 FL (ref 7–12)
POTASSIUM SERPL-SCNC: 3.4 MMOL/L (ref 3.5–5)
RBC # BLD: 3.96 E12/L (ref 3.5–5.5)
SODIUM BLD-SCNC: 141 MMOL/L (ref 132–146)
WBC # BLD: 3.9 E9/L (ref 4.5–11.5)

## 2019-09-13 PROCEDURE — 6370000000 HC RX 637 (ALT 250 FOR IP): Performed by: INTERNAL MEDICINE

## 2019-09-13 PROCEDURE — 96361 HYDRATE IV INFUSION ADD-ON: CPT

## 2019-09-13 PROCEDURE — 3430000000 HC RX DIAGNOSTIC RADIOPHARMACEUTICAL: Performed by: RADIOLOGY

## 2019-09-13 PROCEDURE — 80048 BASIC METABOLIC PNL TOTAL CA: CPT

## 2019-09-13 PROCEDURE — 36415 COLL VENOUS BLD VENIPUNCTURE: CPT

## 2019-09-13 PROCEDURE — 83735 ASSAY OF MAGNESIUM: CPT

## 2019-09-13 PROCEDURE — A9500 TC99M SESTAMIBI: HCPCS | Performed by: RADIOLOGY

## 2019-09-13 PROCEDURE — G0378 HOSPITAL OBSERVATION PER HR: HCPCS

## 2019-09-13 PROCEDURE — 85027 COMPLETE CBC AUTOMATED: CPT

## 2019-09-13 PROCEDURE — 84100 ASSAY OF PHOSPHORUS: CPT

## 2019-09-13 PROCEDURE — 78070 PARATHYROID PLANAR IMAGING: CPT

## 2019-09-13 RX ORDER — POTASSIUM CHLORIDE 20 MEQ/1
40 TABLET, EXTENDED RELEASE ORAL ONCE
Status: COMPLETED | OUTPATIENT
Start: 2019-09-13 | End: 2019-09-13

## 2019-09-13 RX ORDER — POTASSIUM CHLORIDE 20 MEQ/1
40 TABLET, EXTENDED RELEASE ORAL ONCE
Status: DISCONTINUED | OUTPATIENT
Start: 2019-09-13 | End: 2019-09-13

## 2019-09-13 RX ADMIN — POTASSIUM & SODIUM PHOSPHATES POWDER PACK 280-160-250 MG 250 MG: 280-160-250 PACK at 11:40

## 2019-09-13 RX ADMIN — POTASSIUM CHLORIDE 40 MEQ: 1500 TABLET, EXTENDED RELEASE ORAL at 10:00

## 2019-09-13 RX ADMIN — Medication 15 MILLICURIE: at 13:56

## 2019-09-13 ASSESSMENT — PAIN SCALES - GENERAL
PAINLEVEL_OUTOF10: 0
PAINLEVEL_OUTOF10: 0

## 2019-09-13 NOTE — PROGRESS NOTES
RN spoke with Colleen Daniels in Nuclear med, patient has a peripheral IV in the Left arm and is agreeable to have test now.

## 2019-09-13 NOTE — PROGRESS NOTES
RN spoke with Karey Rosenberg in Nuclear med, if the IV can be started soon they will try to get the test done today as it takes 2-3 hours.

## 2019-09-13 NOTE — CONSULTS
recently    Recommendations  1. Continue IV fluid resuscitation with normal saline  2. Neither pamidronate or calcitonin not warranted at this point  3. Supplement phosphorus IV  4. Supplement potassium  5. Follow labs, UO   6. parathyroid scan  7. in the near future may need parathyroidectomy, to be performed in an institution where appropriate surgical procedure is available (namely, the capability to measure PTH intraoperatively)   8. Consider PICC given difficulty with IV access and phlebotomy      Thank you the opportunity to participate in the care of your pleasant patient. We look forward to following along with you.         Electronically signed by Ernesto Dutton MD on 9/12/2019

## 2019-09-13 NOTE — PROGRESS NOTES
Subjective: The patient is awake and alert. No problems overnight. Denies chest pain, angina, and dyspnea. Denies abdominal pain. Tolerating diet. No nausea or vomiting. Objective:    BP (!) 168/76   Pulse 68   Temp 98.3 °F (36.8 °C) (Temporal)   Resp 18   Ht 5' 3\" (1.6 m)   Wt 125 lb (56.7 kg)   SpO2 97%   Breastfeeding? No   BMI 22.14 kg/m²     Current medications that patient is taking have been reviewed. Heart:  RRR, no murmurs, gallops, or rubs.   Lungs:  CTA bilaterally, no wheeze, rales or rhonchi  Abd: bowel sounds present, soft, nontender, nondistended, no masses  Extrem:  No cyanosis or edema    CBC with Differential:    Lab Results   Component Value Date    WBC 3.9 09/13/2019    RBC 3.96 09/13/2019    HGB 13.5 09/13/2019    HCT 38.3 09/13/2019    PLT 92 09/13/2019    MCV 96.7 09/13/2019    MCH 34.1 09/13/2019    MCHC 35.2 09/13/2019    RDW 12.5 09/13/2019    LYMPHOPCT 32.1 09/12/2019    MONOPCT 16.6 09/12/2019    BASOPCT 0.2 09/12/2019    MONOSABS 0.77 09/12/2019    LYMPHSABS 1.49 09/12/2019    EOSABS 0.07 09/12/2019    BASOSABS 0.01 09/12/2019     CMP:    Lab Results   Component Value Date     09/13/2019    K 3.4 09/13/2019     09/13/2019    CO2 23 09/13/2019    BUN 21 09/13/2019    CREATININE 1.0 09/13/2019    GFRAA >60 09/13/2019    LABGLOM >60 09/13/2019    GLUCOSE 90 09/13/2019    PROT 8.2 09/11/2019    LABALBU 4.0 09/11/2019    CALCIUM 10.6 09/13/2019    BILITOT 8.8 09/11/2019    ALKPHOS 80 09/11/2019    AST 53 09/11/2019    ALT 33 09/11/2019     BMP:    Lab Results   Component Value Date     09/13/2019    K 3.4 09/13/2019     09/13/2019    CO2 23 09/13/2019    BUN 21 09/13/2019    LABALBU 4.0 09/11/2019    CREATININE 1.0 09/13/2019    CALCIUM 10.6 09/13/2019    GFRAA >60 09/13/2019    LABGLOM >60 09/13/2019    GLUCOSE 90 09/13/2019     Magnesium:    Lab Results   Component Value Date    MG 1.8 09/13/2019     Phosphorus:    Lab Results   Component Value

## 2019-09-14 NOTE — DISCHARGE SUMMARY
Physician Discharge Summary     Patient ID:  Simmie Peabody  [id number]  61 y.o.  1955    Admit date: 9/11/2019    Discharge date and time:  9/13/2019    Admission Diagnoses:   Patient Active Problem List   Diagnosis    Chronic [facility] without [facility] coma ([facility]    Hypercalcemia    Acute kidney injury ([facility]    Essential hypertension    Moderate protein-calorie malnutrition ([facility]       Discharge Diagnoses: as above    Consults: nephrology    Procedures: see chart    Hospital Course: patient was admitted with nausea, vomiting and abdominal pain. She was found to suffer hypercalcemia. She was seen by nephrology. It was felt the hypercalcemia was secondary to dehydration and HCTZ usage. She was hydrated with IV fluids. Her calcium normalized. Her PTH was WNL for degree of hypercalemia. Her symptoms resolved. She was discharged with close outpatient monitoring of calcium levels expected. Discharge Exam:  See progress note from today    Condition:  stable    Disposition: home    Patient Instructions:   Discharge Medication List as of 9/13/2019  4:38 PM      START taking these medications    Details   potassium & sodium phosphates (PHOS-NAK) 280-160-250 MG PACK Take 1 packet by mouth 4 times daily for 4 doses, Disp-4 packet, R-0Normal         CONTINUE these medications which have NOT CHANGED    Details   Glecaprevir-Pibrentasvir (MAVYRET PO) Take by mouthHistorical Med      losartan (COZAAR) 100 MG tablet Take 1 tablet by mouth daily, Disp-30 tablet, R-3Normal         STOP taking these medications       hydrochlorothiazide (HYDRODIURIL) 25 MG tablet Comments:   Reason for Stopping:             Activity: activity as tolerated  Diet: low fat, low cholesterol diet    Follow up with dr Luz Drake in 1 week. Follow up with dr Asim Parekh in 2-3 weeks.      Note that over 30 minutes was spent in preparing discharge papers, discussing discharge with patient, medication review, etc.    Signed:  Fallon Weiss Hiwot Arellano    9/14/2019  6:49 AM

## 2019-09-16 ENCOUNTER — OFFICE VISIT (OUTPATIENT)
Dept: FAMILY MEDICINE CLINIC | Age: 64
End: 2019-09-16
Payer: COMMERCIAL

## 2019-09-16 ENCOUNTER — TELEPHONE (OUTPATIENT)
Dept: FAMILY MEDICINE CLINIC | Age: 64
End: 2019-09-16

## 2019-09-16 VITALS
DIASTOLIC BLOOD PRESSURE: 82 MMHG | HEART RATE: 77 BPM | TEMPERATURE: 98.5 F | SYSTOLIC BLOOD PRESSURE: 137 MMHG | BODY MASS INDEX: 25.51 KG/M2 | HEIGHT: 62 IN | OXYGEN SATURATION: 98 % | RESPIRATION RATE: 16 BRPM | WEIGHT: 138.6 LBS

## 2019-09-16 DIAGNOSIS — E83.52 HYPERCALCEMIA: Primary | ICD-10-CM

## 2019-09-16 PROCEDURE — 99213 OFFICE O/P EST LOW 20 MIN: CPT | Performed by: FAMILY MEDICINE

## 2019-09-16 ASSESSMENT — ENCOUNTER SYMPTOMS
SINUS PRESSURE: 0
CONSTIPATION: 0
ABDOMINAL PAIN: 0
RHINORRHEA: 0
BACK PAIN: 0
VOMITING: 0
COUGH: 0
NAUSEA: 0
SORE THROAT: 0
WHEEZING: 0
SHORTNESS OF BREATH: 0
DIARRHEA: 0

## 2019-09-16 NOTE — PROGRESS NOTES
Cardiovascular: Normal rate, regular rhythm, normal heart sounds and intact distal pulses. Pulmonary/Chest: Effort normal and breath sounds normal. She has no wheezes. Abdominal: Soft. Bowel sounds are normal. There is no tenderness. Musculoskeletal: Normal range of motion. Lymphadenopathy:     She has no cervical adenopathy. Neurological: She is alert and oriented to person, place, and time. She has normal reflexes. Skin: Skin is warm and dry. No rash noted. Psychiatric: She has a normal mood and affect. Her behavior is normal.   Nursing note and vitals reviewed. Assessment:  1. Hypercalcemia  Will recheck labs in 2 weeks  Reviewed all hospital records  Patient is scheduled to follow up with nephrology in 1 week. - Comprehensive Metabolic Panel; Future      Plan:  As above. Call or go to [address] if symptoms worsen or persist.  Return in about 3 months (around 12/16/2019) for htn., or sooner if necessary. Educational materials and/or home exercises printed forpatient's review and were included in patient instructions on his/her After Visit Summary and given to patient at the end of visit. Counseledregarding above diagnosis, including possible risks and complications,  especially if left uncontrolled. Counseled regarding the possible side effects, risks, benefits and alternatives to treatment; patient and/orguardian verbalizes understanding, agrees, feels comfortable with and wishes to proceed with above treatment plan. Advised patient to call with any new medication issues, and read all Rx info from pharmacy to assureaware of all possible risks and side effects of medication before taking. Reviewed age and gender appropriate health screening exams and vaccinations.   Advised patient regarding importance of keeping up withrecommended health maintenance and to schedule as soon as possible if overdue, as this is important in assessing for undiagnosed pathology, especially

## 2019-09-19 LAB — PTH RELATED PEPTIDE: <2 PMOL/L (ref 0–3.4)

## 2019-09-20 ENCOUNTER — HOSPITAL ENCOUNTER (OUTPATIENT)
Age: 64
Discharge: HOME | End: 2019-09-20
Payer: COMMERCIAL

## 2019-09-20 DIAGNOSIS — E83.52 HYPERCALCEMIA: ICD-10-CM

## 2019-09-20 LAB
ALBUMIN SERPL-MCNC: 3.9 G/DL (ref 3.5–5.2)
ALP BLD-CCNC: 76 U/L (ref 35–104)
ALT SERPL-CCNC: 16 U/L (ref 0–32)
ANION GAP SERPL CALCULATED.3IONS-SCNC: 12 MMOL/L (ref 7–16)
AST SERPL-CCNC: 23 U/L (ref 0–31)
BILIRUB SERPL-MCNC: 2.5 MG/DL (ref 0–1.2)
BUN BLDV-MCNC: 9 MG/DL (ref 8–23)
CALCIUM SERPL-MCNC: 11.1 MG/DL (ref 8.6–10.2)
CHLORIDE BLD-SCNC: 106 MMOL/L (ref 98–107)
CO2: 21 MMOL/L (ref 22–29)
CREAT SERPL-MCNC: 1 MG/DL (ref 0.5–1)
GFR AFRICAN AMERICAN: >60
GFR NON-AFRICAN AMERICAN: >60 ML/MIN/1.73
GLUCOSE BLD-MCNC: 86 MG/DL (ref 74–99)
MAGNESIUM: 2.1 MG/DL (ref 1.6–2.6)
POTASSIUM SERPL-SCNC: 3.9 MMOL/L (ref 3.5–5)
SODIUM BLD-SCNC: 139 MMOL/L (ref 132–146)
TOTAL PROTEIN: 7.6 G/DL (ref 6.4–8.3)

## 2019-09-20 PROCEDURE — 36415 COLL VENOUS BLD VENIPUNCTURE: CPT

## 2019-09-20 PROCEDURE — 83735 ASSAY OF MAGNESIUM: CPT

## 2019-09-20 PROCEDURE — 80053 COMPREHEN METABOLIC PANEL: CPT

## 2019-09-25 ENCOUNTER — NURSE ONLY (OUTPATIENT)
Dept: FAMILY MEDICINE CLINIC | Age: 64
End: 2019-09-25

## 2019-09-25 DIAGNOSIS — E83.52 HYPERCALCEMIA: Primary | ICD-10-CM

## 2019-09-26 ENCOUNTER — NURSE ONLY (OUTPATIENT)
Dept: FAMILY MEDICINE CLINIC | Age: 64
End: 2019-09-26

## 2019-09-26 ENCOUNTER — HOSPITAL ENCOUNTER (OUTPATIENT)
Age: 64
Discharge: HOME | End: 2019-09-28
Payer: COMMERCIAL

## 2019-09-26 DIAGNOSIS — E83.52 HYPERCALCEMIA: Primary | ICD-10-CM

## 2019-09-26 DIAGNOSIS — E83.52 HYPERCALCEMIA: ICD-10-CM

## 2019-09-26 LAB
ALBUMIN SERPL-MCNC: 4.2 G/DL (ref 3.5–5.2)
ALP BLD-CCNC: 83 U/L (ref 35–104)
ALT SERPL-CCNC: 11 U/L (ref 0–32)
ANION GAP SERPL CALCULATED.3IONS-SCNC: 15 MMOL/L (ref 7–16)
AST SERPL-CCNC: 24 U/L (ref 0–31)
BILIRUB SERPL-MCNC: 2.9 MG/DL (ref 0–1.2)
BUN BLDV-MCNC: 11 MG/DL (ref 8–23)
CALCIUM SERPL-MCNC: 11.8 MG/DL (ref 8.6–10.2)
CHLORIDE BLD-SCNC: 100 MMOL/L (ref 98–107)
CO2: 21 MMOL/L (ref 22–29)
CREAT SERPL-MCNC: 1 MG/DL (ref 0.5–1)
GFR AFRICAN AMERICAN: >60
GFR NON-AFRICAN AMERICAN: >60 ML/MIN/1.73
GLUCOSE BLD-MCNC: 94 MG/DL (ref 74–99)
POTASSIUM SERPL-SCNC: 3.6 MMOL/L (ref 3.5–5)
SODIUM BLD-SCNC: 136 MMOL/L (ref 132–146)
TOTAL PROTEIN: 8.2 G/DL (ref 6.4–8.3)

## 2019-09-26 PROCEDURE — 80053 COMPREHEN METABOLIC PANEL: CPT

## 2019-11-12 DIAGNOSIS — I10 ESSENTIAL (PRIMARY) HYPERTENSION: ICD-10-CM

## 2019-11-12 RX ORDER — LOSARTAN POTASSIUM 100 MG/1
100 TABLET ORAL DAILY
Qty: 30 TABLET | Refills: 3 | Status: SHIPPED | OUTPATIENT
Start: 2019-11-12 | End: 2019-11-18 | Stop reason: ALTCHOICE

## 2019-11-18 DIAGNOSIS — I10 ESSENTIAL (PRIMARY) HYPERTENSION: ICD-10-CM

## 2019-11-18 RX ORDER — LOSARTAN POTASSIUM 100 MG/1
100 TABLET ORAL DAILY
Qty: 30 TABLET | Refills: 0 | Status: SHIPPED
Start: 2019-11-18 | End: 2020-03-25 | Stop reason: SDUPTHER

## 2019-12-18 ENCOUNTER — HOSPITAL ENCOUNTER (OUTPATIENT)
Dept: ULTRASOUND IMAGING | Age: 64
Discharge: HOME | End: 2019-12-20
Payer: COMMERCIAL

## 2019-12-18 ENCOUNTER — HOSPITAL ENCOUNTER (OUTPATIENT)
Age: 64
Discharge: HOME | End: 2019-12-20
Payer: COMMERCIAL

## 2019-12-18 ENCOUNTER — OFFICE VISIT (OUTPATIENT)
Dept: FAMILY MEDICINE CLINIC | Age: 64
End: 2019-12-18
Payer: COMMERCIAL

## 2019-12-18 VITALS
OXYGEN SATURATION: 97 % | TEMPERATURE: 98 F | BODY MASS INDEX: 24.95 KG/M2 | DIASTOLIC BLOOD PRESSURE: 74 MMHG | HEIGHT: 62 IN | WEIGHT: 135.6 LBS | HEART RATE: 69 BPM | SYSTOLIC BLOOD PRESSURE: 136 MMHG | RESPIRATION RATE: 14 BRPM

## 2019-12-18 DIAGNOSIS — M54.2 ANTERIOR NECK PAIN: ICD-10-CM

## 2019-12-18 DIAGNOSIS — Z12.11 COLON CANCER SCREENING: ICD-10-CM

## 2019-12-18 DIAGNOSIS — H54.61 VISION LOSS OF RIGHT EYE: ICD-10-CM

## 2019-12-18 DIAGNOSIS — M54.2 ANTERIOR NECK PAIN: Primary | ICD-10-CM

## 2019-12-18 LAB
ALBUMIN SERPL-MCNC: 4.3 G/DL (ref 3.5–5.2)
ALP BLD-CCNC: 126 U/L (ref 35–104)
ALT SERPL-CCNC: 12 U/L (ref 0–32)
ANION GAP SERPL CALCULATED.3IONS-SCNC: 12 MMOL/L (ref 7–16)
AST SERPL-CCNC: 21 U/L (ref 0–31)
BASOPHILS ABSOLUTE: 0.02 E9/L (ref 0–0.2)
BASOPHILS RELATIVE PERCENT: 0.4 % (ref 0–2)
BILIRUB SERPL-MCNC: 0.6 MG/DL (ref 0–1.2)
BUN BLDV-MCNC: 9 MG/DL (ref 8–23)
CALCIUM SERPL-MCNC: 10.9 MG/DL (ref 8.6–10.2)
CHLORIDE BLD-SCNC: 108 MMOL/L (ref 98–107)
CO2: 23 MMOL/L (ref 22–29)
CREAT SERPL-MCNC: 0.9 MG/DL (ref 0.5–1)
EOSINOPHILS ABSOLUTE: 0.1 E9/L (ref 0.05–0.5)
EOSINOPHILS RELATIVE PERCENT: 1.9 % (ref 0–6)
GFR AFRICAN AMERICAN: >60
GFR NON-AFRICAN AMERICAN: >60 ML/MIN/1.73
GLUCOSE BLD-MCNC: 80 MG/DL (ref 74–99)
HCT VFR BLD CALC: 44.3 % (ref 34–48)
HEMOGLOBIN: 14.8 G/DL (ref 11.5–15.5)
IMMATURE GRANULOCYTES #: 0.02 E9/L
IMMATURE GRANULOCYTES %: 0.4 % (ref 0–5)
LYMPHOCYTES ABSOLUTE: 2.14 E9/L (ref 1.5–4)
LYMPHOCYTES RELATIVE PERCENT: 41.3 % (ref 20–42)
MCH RBC QN AUTO: 32.1 PG (ref 26–35)
MCHC RBC AUTO-ENTMCNC: 33.4 % (ref 32–34.5)
MCV RBC AUTO: 96.1 FL (ref 80–99.9)
MONOCYTES ABSOLUTE: 0.47 E9/L (ref 0.1–0.95)
MONOCYTES RELATIVE PERCENT: 9.1 % (ref 2–12)
NEUTROPHILS ABSOLUTE: 2.43 E9/L (ref 1.8–7.3)
NEUTROPHILS RELATIVE PERCENT: 46.9 % (ref 43–80)
PDW BLD-RTO: 12 FL (ref 11.5–15)
PLATELET # BLD: 126 E9/L (ref 130–450)
PMV BLD AUTO: 12.2 FL (ref 7–12)
POTASSIUM SERPL-SCNC: 4.7 MMOL/L (ref 3.5–5)
RBC # BLD: 4.61 E12/L (ref 3.5–5.5)
SODIUM BLD-SCNC: 143 MMOL/L (ref 132–146)
TOTAL PROTEIN: 7.6 G/DL (ref 6.4–8.3)
TSH SERPL DL<=0.05 MIU/L-ACNC: 0.81 UIU/ML (ref 0.27–4.2)
WBC # BLD: 5.2 E9/L (ref 4.5–11.5)

## 2019-12-18 PROCEDURE — 84443 ASSAY THYROID STIM HORMONE: CPT

## 2019-12-18 PROCEDURE — 80053 COMPREHEN METABOLIC PANEL: CPT

## 2019-12-18 PROCEDURE — 99214 OFFICE O/P EST MOD 30 MIN: CPT | Performed by: FAMILY MEDICINE

## 2019-12-18 PROCEDURE — 76536 US EXAM OF HEAD AND NECK: CPT

## 2019-12-18 PROCEDURE — 85025 COMPLETE CBC W/AUTO DIFF WBC: CPT

## 2019-12-18 ASSESSMENT — ENCOUNTER SYMPTOMS
RHINORRHEA: 0
SINUS PRESSURE: 0
BACK PAIN: 0
WHEEZING: 0
COUGH: 0
NAUSEA: 0
ABDOMINAL PAIN: 0
CONSTIPATION: 0
DIARRHEA: 0
SORE THROAT: 0
SHORTNESS OF BREATH: 0
VOMITING: 0

## 2019-12-23 ENCOUNTER — TELEPHONE (OUTPATIENT)
Dept: FAMILY MEDICINE CLINIC | Age: 64
End: 2019-12-23

## 2019-12-23 ENCOUNTER — HOSPITAL ENCOUNTER (OUTPATIENT)
Age: 64
Discharge: HOME | End: 2019-12-25
Payer: COMMERCIAL

## 2019-12-23 DIAGNOSIS — E83.52 HYPERCALCEMIA: ICD-10-CM

## 2019-12-23 DIAGNOSIS — E83.52 HYPERCALCEMIA: Primary | ICD-10-CM

## 2019-12-23 PROCEDURE — 80053 COMPREHEN METABOLIC PANEL: CPT

## 2019-12-24 LAB
ALBUMIN SERPL-MCNC: 4 G/DL (ref 3.5–5.2)
ALP BLD-CCNC: 117 U/L (ref 35–104)
ALT SERPL-CCNC: 12 U/L (ref 0–32)
ANION GAP SERPL CALCULATED.3IONS-SCNC: 13 MMOL/L (ref 7–16)
AST SERPL-CCNC: 21 U/L (ref 0–31)
BILIRUB SERPL-MCNC: 0.6 MG/DL (ref 0–1.2)
BUN BLDV-MCNC: 11 MG/DL (ref 8–23)
CALCIUM SERPL-MCNC: 11.1 MG/DL (ref 8.6–10.2)
CHLORIDE BLD-SCNC: 108 MMOL/L (ref 98–107)
CO2: 20 MMOL/L (ref 22–29)
CREAT SERPL-MCNC: 0.9 MG/DL (ref 0.5–1)
GFR AFRICAN AMERICAN: >60
GFR NON-AFRICAN AMERICAN: >60 ML/MIN/1.73
GLUCOSE BLD-MCNC: 87 MG/DL (ref 74–99)
POTASSIUM SERPL-SCNC: 4.1 MMOL/L (ref 3.5–5)
SODIUM BLD-SCNC: 141 MMOL/L (ref 132–146)
TOTAL PROTEIN: 7.2 G/DL (ref 6.4–8.3)

## 2020-01-15 ENCOUNTER — TELEPHONE (OUTPATIENT)
Dept: FAMILY MEDICINE CLINIC | Age: 65
End: 2020-01-15

## 2020-01-15 NOTE — TELEPHONE ENCOUNTER
Patient would like to have results of the [address] she had done 12/23/2019 please advise

## 2020-03-05 RX ORDER — AMLODIPINE BESYLATE 5 MG/1
5 TABLET ORAL DAILY
Qty: 30 TABLET | Refills: 1 | OUTPATIENT
Start: 2020-03-05

## 2020-03-05 RX ORDER — AMLODIPINE BESYLATE 5 MG/1
TABLET ORAL
COMMUNITY
Start: 2020-01-13 | End: 2021-08-24 | Stop reason: ALTCHOICE

## 2020-03-05 NOTE — TELEPHONE ENCOUNTER
Spoke with patient. She is taking the cozaar and the amlodipine. Patient says Dr. Laurie Fierro prescribed.

## 2020-03-25 RX ORDER — LOSARTAN POTASSIUM 100 MG/1
100 TABLET ORAL DAILY
Qty: 30 TABLET | Refills: 1 | Status: SHIPPED
Start: 2020-03-25 | End: 2020-12-18

## 2020-04-06 ENCOUNTER — TELEPHONE (OUTPATIENT)
Dept: FAMILY MEDICINE CLINIC | Age: 65
End: 2020-04-06

## 2020-04-06 NOTE — TELEPHONE ENCOUNTER
Patient would like the providers recommendation if she should self quarantine. Her employer states that the employees are able to if need be. 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 She is anxious, not experiencing any symptoms. If Dr. Gilberto Daily suggest she stays home, she would need a letter to provide to employer.

## 2020-05-08 ENCOUNTER — TELEPHONE (OUTPATIENT)
Dept: FAMILY MEDICINE CLINIC | Age: 65
End: 2020-05-08

## 2020-06-11 ENCOUNTER — TELEPHONE (OUTPATIENT)
Dept: ADMINISTRATIVE | Age: 65
End: 2020-06-11

## 2020-06-11 NOTE — TELEPHONE ENCOUNTER
I called patient to schedule her an apt with Dr Khalif Niño for her mammogram script and also to talk about getting a colonoscopy. She will call later to schedule her apt.

## 2020-07-14 ENCOUNTER — HOSPITAL ENCOUNTER (EMERGENCY)
Age: 65
Discharge: HOME | End: 2020-07-15
Attending: EMERGENCY MEDICINE
Payer: COMMERCIAL

## 2020-07-14 LAB
ALBUMIN SERPL-MCNC: 4.1 G/DL (ref 3.5–5.2)
ALP BLD-CCNC: 113 U/L (ref 35–104)
ALT SERPL-CCNC: 17 U/L (ref 0–32)
ANION GAP SERPL CALCULATED.3IONS-SCNC: 11 MMOL/L (ref 7–16)
AST SERPL-CCNC: 22 U/L (ref 0–31)
BASOPHILS ABSOLUTE: 0.02 E9/L (ref 0–0.2)
BASOPHILS RELATIVE PERCENT: 0.3 % (ref 0–2)
BILIRUB SERPL-MCNC: 0.4 MG/DL (ref 0–1.2)
BUN BLDV-MCNC: 16 MG/DL (ref 8–23)
CALCIUM SERPL-MCNC: 10.2 MG/DL (ref 8.6–10.2)
CHLORIDE BLD-SCNC: 111 MMOL/L (ref 98–107)
CO2: 24 MMOL/L (ref 22–29)
CREAT SERPL-MCNC: 1.1 MG/DL (ref 0.5–1)
EOSINOPHILS ABSOLUTE: 0.16 E9/L (ref 0.05–0.5)
EOSINOPHILS RELATIVE PERCENT: 2.7 % (ref 0–6)
GFR AFRICAN AMERICAN: >60
GFR NON-AFRICAN AMERICAN: >60 ML/MIN/1.73
GLUCOSE BLD-MCNC: 93 MG/DL (ref 74–99)
HCT VFR BLD CALC: 45.9 % (ref 34–48)
HEMOGLOBIN: 15.5 G/DL (ref 11.5–15.5)
IMMATURE GRANULOCYTES #: 0.03 E9/L
IMMATURE GRANULOCYTES %: 0.5 % (ref 0–5)
LYMPHOCYTES ABSOLUTE: 2.45 E9/L (ref 1.5–4)
LYMPHOCYTES RELATIVE PERCENT: 42 % (ref 20–42)
MCH RBC QN AUTO: 31.8 PG (ref 26–35)
MCHC RBC AUTO-ENTMCNC: 33.8 % (ref 32–34.5)
MCV RBC AUTO: 94.3 FL (ref 80–99.9)
MONOCYTES ABSOLUTE: 0.51 E9/L (ref 0.1–0.95)
MONOCYTES RELATIVE PERCENT: 8.7 % (ref 2–12)
NEUTROPHILS ABSOLUTE: 2.67 E9/L (ref 1.8–7.3)
NEUTROPHILS RELATIVE PERCENT: 45.8 % (ref 43–80)
PDW BLD-RTO: 12.9 FL (ref 11.5–15)
PLATELET # BLD: 124 E9/L (ref 130–450)
PMV BLD AUTO: 10.1 FL (ref 7–12)
POTASSIUM SERPL-SCNC: 3.9 MMOL/L (ref 3.5–5)
RBC # BLD: 4.87 E12/L (ref 3.5–5.5)
SODIUM BLD-SCNC: 146 MMOL/L (ref 132–146)
TOTAL PROTEIN: 7.3 G/DL (ref 6.4–8.3)
WBC # BLD: 5.8 E9/L (ref 4.5–11.5)

## 2020-07-14 PROCEDURE — G0480 DRUG TEST DEF 1-7 CLASSES: HCPCS

## 2020-07-14 PROCEDURE — 85025 COMPLETE CBC W/AUTO DIFF WBC: CPT

## 2020-07-14 PROCEDURE — 80307 DRUG TEST PRSMV CHEM ANLYZR: CPT

## 2020-07-14 PROCEDURE — 99284 EMERGENCY DEPT VISIT MOD MDM: CPT

## 2020-07-14 PROCEDURE — 36415 COLL VENOUS BLD VENIPUNCTURE: CPT

## 2020-07-14 PROCEDURE — 80053 COMPREHEN METABOLIC PANEL: CPT

## 2020-07-15 VITALS
DIASTOLIC BLOOD PRESSURE: 88 MMHG | BODY MASS INDEX: 27.11 KG/M2 | HEIGHT: 63 IN | OXYGEN SATURATION: 96 % | SYSTOLIC BLOOD PRESSURE: 165 MMHG | TEMPERATURE: 98.7 F | RESPIRATION RATE: 18 BRPM | HEART RATE: 77 BPM | WEIGHT: 153 LBS

## 2020-07-15 LAB
ACETAMINOPHEN LEVEL: <5 MCG/ML (ref 10–30)
AMPHETAMINE SCREEN, URINE: NOT DETECTED
BACTERIA: ABNORMAL /HPF
BARBITURATE SCREEN URINE: NOT DETECTED
BENZODIAZEPINE SCREEN, URINE: NOT DETECTED
BILIRUBIN URINE: NEGATIVE
BLOOD, URINE: NEGATIVE
CANNABINOID SCREEN URINE: POSITIVE
CLARITY: CLEAR
COCAINE METABOLITE SCREEN URINE: NOT DETECTED
COLOR: YELLOW
EPITHELIAL CELLS, UA: ABNORMAL /HPF
ETHANOL: 222 MG/DL (ref 0–0.08)
ETHANOL: 83 MG/DL (ref 0–0.08)
FENTANYL SCREEN, URINE: NOT DETECTED
GLUCOSE URINE: NEGATIVE MG/DL
KETONES, URINE: NEGATIVE MG/DL
LEUKOCYTE ESTERASE, URINE: ABNORMAL
Lab: ABNORMAL
METHADONE SCREEN, URINE: NOT DETECTED
NITRITE, URINE: NEGATIVE
OPIATE SCREEN URINE: NOT DETECTED
OXYCODONE URINE: NOT DETECTED
PH UA: 6.5 (ref 5–9)
PHENCYCLIDINE SCREEN URINE: NOT DETECTED
PROTEIN UA: NEGATIVE MG/DL
RBC UA: ABNORMAL /HPF (ref 0–2)
SALICYLATE, SERUM: <0.3 MG/DL (ref 0–30)
SPECIFIC GRAVITY UA: 1.01 (ref 1–1.03)
TRICYCLIC ANTIDEPRESSANTS SCREEN SERUM: NEGATIVE NG/ML
UROBILINOGEN, URINE: 0.2 E.U./DL
WBC UA: ABNORMAL /HPF (ref 0–5)

## 2020-07-15 PROCEDURE — 6370000000 HC RX 637 (ALT 250 FOR IP): Performed by: EMERGENCY MEDICINE

## 2020-07-15 PROCEDURE — 81001 URINALYSIS AUTO W/SCOPE: CPT

## 2020-07-15 PROCEDURE — 80307 DRUG TEST PRSMV CHEM ANLYZR: CPT

## 2020-07-15 PROCEDURE — G0480 DRUG TEST DEF 1-7 CLASSES: HCPCS

## 2020-07-15 RX ORDER — ACETAMINOPHEN 325 MG/1
650 TABLET ORAL ONCE
Status: COMPLETED | OUTPATIENT
Start: 2020-07-15 | End: 2020-07-15

## 2020-07-15 RX ADMIN — ACETAMINOPHEN 650 MG: 325 TABLET ORAL at 02:32

## 2020-07-15 ASSESSMENT — PAIN SCALES - GENERAL: PAINLEVEL_OUTOF10: 3

## 2020-07-15 NOTE — ED NOTES
Patient is a 59year old female who presented to the ED for a psychiatric evaluation. Neighbor called 911 after patient confided in her expressing upset from her sister passing away June 29th and losing her job yesterday. Patient does have an interview this morning, however and did not seem phased by losing her job. Patient denies SI, HI and AVH. She reports she was drinking, which she does on occasion and confided in someone she doesn't know that well or for that long rather than calling family. 2 of patient's daughters were here. Daughter at bedside adamantly denied any concern for her mother, stating she has no history of this and would stay the night with her when discharged. She does not currently and has never treated with a counselor or psychiatrist and is not/has never been on any psychiatric medications. Patient has no known psych history. She reports good sleep and good appetite. She is calm and cooperative. Patient does not currently meet inpatient criteria for admission. She works for "[employer]. Dr. Niki Francis would like patient's ETOH to be below 100 prior to discharging. Family and patient notified. Verbalized understanding.       Hilary Chavez RN  07/15/20 0143

## 2020-07-15 NOTE — ED PROVIDER NOTES
HPI:  7/14/20, Time: 10:41 PM EDT         Sara Schofield is a 59 y.o. female presenting to the ED for feeling depressed and emotionally upset, beginning 1 day ago. The complaint has been persistent, moderate in severity, and worsened by emotional upset. Patient reports recent loss of job. She also reports her sister passed away at the end of June. Patient reporting feeling depressed and reported that she was \"tired of life \". Patient reports that she does not want to hurt herself or others. Patient reporting no hallucinations. Patient reporting no fever chills or cough. Patient reports no prior history of attempt to harm herself. Patient reporting no weakness or dizziness. ROS:   Pertinent positives and negatives are stated within HPI, all other systems reviewed and are negative.  --------------------------------------------- PAST HISTORY ---------------------------------------------  Past Medical History:  has a past medical history of HTN (hypertension). Past Surgical History:  has a past surgical history that includes Cholecystectomy (2012); partial hysterectomy (cervix not removed) (2001); Tonsillectomy; and Hemorrhoid surgery. Social History:  reports that she has been smoking. She has a 1.25 pack-year smoking history. She has never used smokeless tobacco. She reports current alcohol use. She reports that she does not use drugs. Family History: family history includes COPD in her sister; Gross Workman in her father; No Known Problems in her brother, brother, daughter, daughter, daughter, maternal grandfather, maternal grandmother, mother, paternal grandfather, paternal grandmother, sister, son, and son; Other in her sister. The patients home medications have been reviewed.     Allergies: Morphine    ---------------------------------------------------PHYSICAL EXAM--------------------------------------    Constitutional/General: Alert and oriented x3, tearful crying  Head: Normocephalic and Absolute 0.51 0.10 - 0.95 E9/L    Eosinophils Absolute 0.16 0.05 - 0.50 E9/L    Basophils Absolute 0.02 0.00 - 0.20 E9/L   Comprehensive Metabolic Panel   Result Value Ref Range    Sodium 146 132 - 146 mmol/L    Potassium 3.9 3.5 - 5.0 mmol/L    Chloride 111 (H) 98 - 107 mmol/L    CO2 24 22 - 29 mmol/L    Anion Gap 11 7 - 16 mmol/L    Glucose 93 74 - 99 mg/dL    BUN 16 8 - 23 mg/dL    CREATININE 1.1 (H) 0.5 - 1.0 mg/dL    GFR Non-African American >60 >=60 mL/min/1.73    GFR African American >60     Calcium 10.2 8.6 - 10.2 mg/dL    Total Protein 7.3 6.4 - 8.3 g/dL    Alb 4.1 3.5 - 5.2 g/dL    Total Bilirubin 0.4 0.0 - 1.2 mg/dL    Alkaline Phosphatase 113 (H) 35 - 104 U/L    ALT 17 0 - 32 U/L    AST 22 0 - 31 U/L   Serum Drug Screen   Result Value Ref Range    Ethanol Lvl 222 mg/dL    Acetaminophen Level <5.0 (L) 10.0 - 53.9 mcg/mL    Salicylate, Serum <3.1 0.0 - 30.0 mg/dL    TCA Scrn NEGATIVE Cutoff:300 ng/mL   Urine Drug Screen   Result Value Ref Range    Amphetamine Screen, Urine NOT DETECTED Negative <1000 ng/mL    Barbiturate Screen, Ur NOT DETECTED Negative < 200 ng/mL    Benzodiazepine Screen, Urine NOT DETECTED Negative < 200 ng/mL    Cannabinoid Scrn, Ur POSITIVE (A) Negative < 50ng/mL    Cocaine Metabolite Screen, Urine NOT DETECTED Negative < 300 ng/mL    Opiate Scrn, Ur NOT DETECTED Negative < 300ng/mL    PCP Screen, Urine NOT DETECTED Negative < 25 ng/mL    Methadone Screen, Urine NOT DETECTED Negative <300 ng/mL    Oxycodone Urine NOT DETECTED Negative <100 ng/mL    FENTANYL SCREEN, URINE NOT DETECTED Negative <1 ng/mL    Drug Screen Comment: see below    Urinalysis   Result Value Ref Range    Color, UA Yellow Straw/Yellow    Clarity, UA Clear Clear    Glucose, Ur Negative Negative mg/dL    Bilirubin Urine Negative Negative    Ketones, Urine Negative Negative mg/dL    Specific Gravity, UA 1.010 1.005 - 1.030    Blood, Urine Negative Negative    pH, UA 6.5 5.0 - 9.0    Protein, UA Negative Negative mg/dL    Urobilinogen, Urine 0.2 <2.0 E.U./dL    Nitrite, Urine Negative Negative    Leukocyte Esterase, Urine TRACE (A) Negative   Microscopic Urinalysis   Result Value Ref Range    WBC, UA 1-3 0 - 5 /HPF    RBC, UA NONE 0 - 2 /HPF    Epithelial Cells, UA FEW /HPF    Bacteria, UA RARE (A) None Seen /HPF   Ethanol   Result Value Ref Range    Ethanol Lvl 83 mg/dL       RADIOLOGY:  Interpreted by Radiologist.  No orders to display               ------------------------- NURSING NOTES AND VITALS REVIEWED ---------------------------   The nursing notes within the ED encounter and vital signs as below have been reviewed by myself. BP (!) 158/79   Pulse 78   Temp 97.4 °F (36.3 °C)   Resp 18   Ht 5' 3\" (1.6 m)   Wt 153 lb (69.4 kg)   SpO2 96%   BMI 27.10 kg/m²   Oxygen Saturation Interpretation: Normal    The patients available past medical records and past encounters were reviewed. ------------------------------ ED COURSE/MEDICAL DECISION MAKING----------------------  Medications   acetaminophen (TYLENOL) tablet 650 mg (650 mg Oral Given 7/15/20 0232)             Medical Decision Making:        Re-Evaluations:             Re-evaluation. Patients symptoms show no change    Patient reevaluated in no distress. Patient reporting no homicidal suicidal thoughts   Please note patient was rechecked around 6:30 AM.  Patient present with her daughter and daughter feels comfortable with her going home as well. Consultations:                 Critical Care: This patient's ED course included: a personal history and physicial eaxmination    This patient has been closely monitored during their ED course. Counseling: The emergency provider has spoken with the patient and discussed todays results, in addition to providing specific details for the plan of care and counseling regarding the diagnosis and prognosis. Questions are answered at this time and they are agreeable with the plan.

## 2020-12-18 ENCOUNTER — OFFICE VISIT (OUTPATIENT)
Dept: PRIMARY CARE CLINIC | Age: 65
End: 2020-12-18

## 2020-12-18 ENCOUNTER — TELEPHONE (OUTPATIENT)
Dept: FAMILY MEDICINE CLINIC | Age: 65
End: 2020-12-18

## 2020-12-18 VITALS
SYSTOLIC BLOOD PRESSURE: 200 MMHG | HEIGHT: 63 IN | TEMPERATURE: 97.3 F | WEIGHT: 150 LBS | DIASTOLIC BLOOD PRESSURE: 98 MMHG | BODY MASS INDEX: 26.58 KG/M2 | HEART RATE: 81 BPM | OXYGEN SATURATION: 95 %

## 2020-12-18 DIAGNOSIS — Z20.822 SUSPECTED COVID-19 VIRUS INFECTION: ICD-10-CM

## 2020-12-18 LAB
Lab: NORMAL
QC PASS/FAIL: NORMAL
SARS-COV-2, POC: NORMAL

## 2020-12-18 PROCEDURE — 99214 OFFICE O/P EST MOD 30 MIN: CPT | Performed by: PHYSICIAN ASSISTANT

## 2020-12-18 PROCEDURE — 87426 SARSCOV CORONAVIRUS AG IA: CPT | Performed by: PHYSICIAN ASSISTANT

## 2020-12-18 RX ORDER — LOSARTAN POTASSIUM 100 MG/1
100 TABLET ORAL DAILY
Qty: 30 TABLET | Refills: 0 | Status: SHIPPED
Start: 2020-12-18 | End: 2021-08-24 | Stop reason: SDUPTHER

## 2020-12-18 RX ORDER — AZITHROMYCIN 250 MG/1
TABLET, FILM COATED ORAL
Qty: 6 TABLET | Refills: 0 | Status: SHIPPED
Start: 2020-12-18 | End: 2021-08-24

## 2020-12-18 RX ORDER — METHYLPREDNISOLONE 4 MG/1
TABLET ORAL
Qty: 1 KIT | Refills: 0 | Status: SHIPPED | OUTPATIENT
Start: 2020-12-18 | End: 2020-12-24

## 2020-12-18 NOTE — TELEPHONE ENCOUNTER
PT CALLED IN with covid symptoms  I told her to go to [facility] flu clinic and she did  While she was there they told her blood pressure was 200 and she wanted bp meds;   Yaa and I advised her to go to the ER

## 2020-12-18 NOTE — LETTER
[address] [place] [address]  [place], [address]   Phone: [phone]  Fax: [phone]    Kath Singletary. Cari Case PA-C      12/18/2020     Patient: Bettina Cartagena   YOB: 1955       To Whom It May Concern: It is my medical opinion that Bettina Cartagena should remain out of work while acutely ill and awaiting COVID-19 test results. Return to work with no retesting should be followed if test is negative AND meets these 3 criteria as outlined by CDC/ODH:     a. No fever without the use of fever reducers for 24 hours  b. Improvement in symptoms  c. At least 10 days since the onset of symptoms (12/4)     If tests positive for COVID-19, needs minimum of 10 days strict quarantine, improvement of symptoms and 24 hours fever free without fever reducing medications. If you have any questions or concerns, please don't hesitate to call. Sincerely,        Kath Singletary.  DENISSE Crawford

## 2020-12-19 LAB — SARS-COV-2, PCR: NOT DETECTED

## 2020-12-21 NOTE — PROGRESS NOTES
Chief Complaint   Cough (nonproductive x3-4 weeks ), Other (loss of taste ), Headache, and Chest Congestion      History of Present Illness   Source of history provided by: patient. Denisse Sales is a 72 y.o. old female who has a past medical history of:   Past Medical History:   Diagnosis Date    HTN (hypertension)    Presents to the flu clinic for evaluation of nonproductive cough, loss of taste, intermittent headaches, and chest congestion x3 weeks. Patient has not been taking anything over-the-counter for symptomatic relief. [place] any known exposure to COVID-19. Denies any fever, chills, CP, dyspnea, LE edema, abdominal pain, vomiting, rash, or lethargy. Denies any hx of asthma or COPD. Patient is a daily smoker. Patient is also noted to be markedly hypertensive on exam.  Patient reports a history of hypertension which was previously controlled with daily Cozaar. She reports she ran out of her medication a few months ago and has not scheduled a follow-up appointment with her PCP for a refill as she ran out of her insurance. Denies any headaches, dizziness, syncope, chest pain, shortness of breath, palpitations, or edema. ROS   Pertinent positives and negatives are stated within HPI, all other systems reviewed and are negative. Surgical History:  has a past surgical history that includes Cholecystectomy (2012); partial hysterectomy (cervix not removed) (2001); Tonsillectomy; and Hemorrhoid surgery. Social History:  reports that she has been smoking. She has a 1.25 pack-year smoking history. She has never used smokeless tobacco. She reports current alcohol use. She reports that she does not use drugs. Family History: family history includes COPD in her sister; Dena Liter in her father; No Known Problems in her brother, brother, daughter, daughter, daughter, maternal grandfather, maternal grandmother, mother, paternal grandfather, paternal grandmother, sister, son, and son; Other in her sister. Allergies: Morphine    Physical Exam      VS:  BP (!) 200/98   Pulse 81   Temp 97.3 °F (36.3 °C) (Temporal)   Ht 5' 3\" (1.6 m)   Wt 150 lb (68 kg)   SpO2 95%   BMI 26.57 kg/m²    Oxygen Saturation Interpretation: Normal.    Constitutional:  Alert, development consistent with age. NAD. Head:  NC/NT. Airway patent. Mouth: Posterior pharynx with mild erythema and clear postnasal drip. No tonsillar hypertrophy or exudate. Neck:  Normal ROM. Supple. No anterior cervical adenopathy noted. Lungs: CTAB without wheezes, rales, or rhonchi. CV:  Regular rate and rhythm, normal heart sounds, without pathological murmurs, ectopy, gallops, or rubs. Skin:  Normal turgor. Warm, dry, without visible rash. Lymphatic: No lymphangitis or adenopathy noted. Neurological:  Oriented. Motor functions intact. Lab / Imaging Results   (All laboratory and radiology results have been personally reviewed by myself)  Labs:  Results for orders placed or performed in visit on 12/18/20   POCT COVID-19, Antigen   Result Value Ref Range    SARS-COV-2, POC Not-Detected Not Detected    Lot Number [id number]     QC Pass/Fail pass        Imaging: All Radiology results interpreted by Radiologist unless otherwise noted. No results found. Medical Decision Making   Pt non-toxic, in no apparent distress and stable at time of discharge. Assessment/Plan   Theresa Hayes was seen today for cough, other, headache and chest congestion.     Diagnoses and all orders for this visit:    Suspected COVID-19 virus infection  -     POCT COVID-19, Antigen  -     Cancel: COVID-19; Future -     azithromycin (ZITHROMAX Z-AFRICA) 250 MG tablet; Take 2 tabs on day one, then 1 tab daily for the next 4 days  -     methylPREDNISolone (MEDROL DOSEPACK) 4 MG tablet; Take by mouth. Uncontrolled hypertension  -     losartan (COZAAR) 100 MG tablet; Take 1 tablet by mouth daily    History of medication noncompliance      Rapid COVID-19 testing is negative in office. Confirmatory PCR swab obtained and pending, will call with results once available. Symptoms are highly suspicious for COVID-19. Prescription written for Zithromax and a Medrol Dosepak, side effects discussed. Patient advised she is no longer considered contagious at this point as she has met the current CDC criteria. She is more than 10 days from the start of illness, her symptoms are improving overall, and she has been fever free for the past 24 hours. Increase fluids and rest.  Additional symptomatic relief discussed including Tylenol prn pain/fever. Smoking cessation advised. I did provide patient with a 30-day refill of her Cozaar today, advised to  and start immediately. She is in agreement to schedule a follow-up appointment with her PCP within the next 1 to 2 weeks for recheck, blood work, and further management of chronic conditions. She was also advised to start self-monitoring her blood pressure at home. Normal parameters discussed with patient. ED sooner if symptoms worsen or change. ED immediately with any readings greater than 180/110, less than 90/60, high or refractory fever, progressive SOB, dyspnea, CP, calf pain/swelling, shaking chills, vomiting, abdominal pain, dizziness, syncope, lethargy, flank pain, or decreased urinary output. Pt verbalizes understanding and is in agreement with plan of care. All questions answered. Candice Crawford PA-C This visit was provided as a focused evaluation during the COVID -19 pandemic/national emergency. A comprehensive review of all previous patient history and testing was not conducted. Pertinent findings were elicited during the visit.

## 2021-02-03 DIAGNOSIS — I10 UNCONTROLLED HYPERTENSION: ICD-10-CM

## 2021-02-03 RX ORDER — LOSARTAN POTASSIUM 100 MG/1
100 TABLET ORAL DAILY
Qty: 30 TABLET | Refills: 0 | OUTPATIENT
Start: 2021-02-03

## 2021-02-04 NOTE — TELEPHONE ENCOUNTER
Spoke with patient she said she does not have insurance until AUG 2021 I advised [facility] has programs to help she said she would call back and set up an appt.

## 2021-08-05 NOTE — TELEPHONE ENCOUNTER
[facility]  Ostomy Progress Note      NAME:  Marie Gillette  MEDICAL RECORD NUMBER:  [id number]  AGE: [de-identified] y.o. GENDER:  male  :  1941  TODAY'S DATE:  2021    Subjective       Chief Complaint   Patient presents with    Other     ileostomy         HISTORY of PRESENT ILLNESS HPI     Marie Gillette is a [de-identified] y.o. male Established patient  who presents today for ostomy/stoma evaluation. History of Ostomy Context:   Patient has history of total colectomy with ileostomy February of this year related to C-diff colitis. Patient presents today for re-evaluation of pouching system. At last visit pouching system was changed due to frequent leakage and peristomal skin breakdown. Current pouching system includes 1 piece, soft covexity pouch with stoma paste. Patient states that pouching system has been lasting 3-5 days betweent changes. Peristomal skin breakdown has improved by his report, is no longer painful. Patient empties pouch himself every few hours. He denies any further needs or concerns. PAST MEDICAL HISTORY        Diagnosis Date    Arthritis     Chronic kidney disease     Depression     Diabetes mellitus ([address]     Hyperlipidemia     Hypertension     Thyroid disease        PAST SURGICAL HISTORY    Past Surgical History:   Procedure Laterality Date    COLOSTOMY N/A 2021    TOTAL COLECTOMY AND ILLEOSTOMY PLACEMENT [facility]        FAMILY HISTORY    History reviewed. No pertinent family history. SOCIAL HISTORY    Social History     Tobacco Use    Smoking status: Never Smoker    Smokeless tobacco: Never Used   Vaping Use    Vaping Use: Never used   Substance Use Topics    Alcohol use: Not Currently    Drug use: Never       ALLERGIES    Allergies   Allergen Reactions    Triamcinolone Rash     Patient states his arm felt like it was hot, like it was burning.          MEDICATIONS    Current Outpatient Needs seen for labs Medications on File Prior to Encounter   Medication Sig Dispense Refill    acetaminophen (TYLENOL) 500 MG tablet Take 500 mg by mouth 2 times daily 2 tabs      ARIPiprazole (ABILIFY) 10 MG tablet Take 10 mg by mouth daily      hydrALAZINE (APRESOLINE) 25 MG tablet Take 1 tablet by mouth 3 times daily as needed (see below) 90 tablet 3    metoprolol tartrate (LOPRESSOR) 25 MG tablet Take 1 tablet by mouth 2 times daily 60 tablet 3    miconazole (MICOTIN) 2 % powder Apply topically 2 times daily. 45 g 1    Calcium-Vitamin D 600-200 MG-UNIT TABS Take 1 tablet by mouth daily      gabapentin (NEURONTIN) 800 MG tablet Take 800 mg by mouth 3 times daily.  sertraline (ZOLOFT) 100 MG tablet Take 200 mg by mouth daily      busPIRone (BUSPAR) 15 MG tablet Take 10 mg by mouth 3 times daily       docusate sodium (COLACE) 100 MG capsule Take 100 mg by mouth daily as needed for Constipation      loperamide (IMODIUM) 2 MG capsule Take 2 mg by mouth every 8 hours as needed for Diarrhea       magnesium hydroxide (MILK OF MAGNESIA) 400 MG/5ML suspension Take 30 mLs by mouth daily as needed for Constipation       Multiple Vitamins-Minerals (THERAPEUTIC MULTIVITAMIN-MINERALS) tablet Take 1 tablet by mouth daily      mirtazapine (REMERON) 45 MG tablet Take 45 mg by mouth daily       vitamin D (ERGOCALCIFEROL) 1.25 MG (60182 UT) CAPS capsule Take 50,000 Units by mouth once a week Every friday      buPROPion (WELLBUTRIN XL) 300 MG extended release tablet Take 300 mg by mouth every morning      levothyroxine (SYNTHROID) 75 MCG tablet Take 75 mcg by mouth Daily      tamsulosin (FLOMAX) 0.4 MG capsule Take 0.4 mg by mouth daily      atorvastatin (LIPITOR) 40 MG tablet Take 40 mg by mouth daily       No current facility-administered medications on file prior to encounter.        REVIEW OF SYSTEMS  Constitutional: Denies fever, chills, night sweats, fatigue, weight loss/gain, loss of appetite   Head: Denies headache, dizziness, loss of consciousness  Respiratory: Denies shortness of breath, cough, wheezing, dyspnea with exertion  Cardiovascular:Denies chest pain, palpitations, edema  Gastrointestinal: +ileostomy Denies nausea, vomiting, constipation, diarrhea, abdominal pain   CARI: Denies dysuria, frequency, urgency, hematuria  Musculoskeletal: +generalized weakness Denies joint pain, swelling , stiffness,  Endocrine: Denies polyuria, polydipsia, cold or heat intolerance  Hematology: Denies easy brusing or bleeding, hx of clotting disorder  Dermatology: Denies skin rash, eczema, pruritis    Objective    BP (!) 116/56   Pulse 65   Temp 98 °F (36.7 °C) (Infrared)   Resp 18   SpO2 99%     Wt Readings from Last 3 Encounters:   07/22/21 252 lb (114.3 kg)   06/15/21 254 lb 3.2 oz (115.3 kg)   05/18/21 253 lb 12.8 oz (115.1 kg)       PHYSICAL EXAM    General:  Awake, alert, no apparent distress. Appears stated age  [de-identified]: conjuctivae are clear without exudate or hemorrhage, anicteric sclera, moist oral mucosa. Chest:  Respirations regular, non-labored. Chest rise and fall equal bilaterally. Psychiatric:  Appropriate mood and affect  Extremities: non-traumatic in appearance. Skin:  Warm and dry     Ostomy type: TYPE OF OSTOMY: Ileostomy  Ostomy location: Right lower quardrant   Stoma size: 38 mm  Stoma description: Beefy red, oval  Rosy stomal skin: red and irritated  Mucocutaneous junction: intact  Stool color and consistency: Brown, loose  Stool amount: large  Abdomen is soft and crease noted 3 and 6 O'clock.                  LABS       CBC:   Lab Results   Component Value Date    WBC 13.4 03/01/2021    HGB 8.0 03/03/2021    HCT 26.7 03/03/2021    MCV 98.0 03/01/2021     03/01/2021     BMP:   Lab Results   Component Value Date     03/05/2021    K 3.9 03/05/2021    K 3.5 02/26/2021     03/05/2021    CO2 20 03/05/2021    PHOS 4.1 03/04/2021    BUN 16 03/05/2021    CREATININE 1.6 03/05/2021     PT/INR: Lab Results   Component Value Date    INR 1.25 02/17/2021     Prealbumin: No results found for: PREALBUMIN  Albumin:  Lab Results   Component Value Date    LABALBU 2.6 02/19/2021     Sed Rate:No results found for: Rohit Momin  Micro:   Lab Results   Component Value Date    BC No growth-preliminary No growth  02/17/2021          Assessment     -Ileostomy care  -Peristomal moisture associated dermatitis    Patient Active Problem List   Diagnosis Code    Essential hypertension I10    ASHD (arteriosclerotic heart disease) I25.10    Arthritis M19.90    Severe episode of recurrent major depressive disorder, without psychotic features ([address] F33.2    Acquired hypothyroidism E03.9    Type 2 diabetes mellitus without complication, without long-term current use of insulin ([address] E11.9    COVID-19 with multiple comorbidities U07.1    History of recent hospitalization Z92.89    Cellulitis of right lower extremity L03.115    Pulmonary nodule R91.1    Hypotension I95.9    Septic shock (HCC) A41.9, R65.21    Hypovolemic shock (HCC) R57.1    Colitis, Clostridium difficile A04.72    Ileostomy in place ([facility] Z93.2    Peristomal dermatitis associated with moisture L30.8       Plan   Patient examined and evaluated    -Ileostomy care- Current pouching system appears to be working well for patient, lasting 3-5 days between changes. Patient reports that he has gained weight recently, stoma now appears oval.  He is currently using a pre-cut pouch. Will add stoma paste to process to help fill in and protect any exposed skin. Previously used cut to fit pouches resulted in significant injury to peristomal skin due to inconsistency with cutting. Patient and his son in law are agreeable to this plan. -Peristomal MASD- Significantly improved from last visit. Will add paste as above for additional protection of skin.   Continue stoma powder until irritation resolves then discontinue.      -Pouch needs to be emptied about 1 hour after meals, before bedtime, and as needed when 1/3 full.    -Clean your stoma and surrounding skin with plain tap water. Soap, body wash, baby wipes, or other bathroom wipes are not necessary and can interfere with pouch adherence. Once clean, the skin surrounding the stoma should be completely dry prior to application of pouching system. Follow up: 1 year for re-evaluation. Call clinic with any further needs or concerns. Please see attached Discharge Instructions    Written patient dismissal instructions given to patient and signed by patient or POA. All available lab work, radiology studies, and progress notes pertaining to Shirley Platt reviewed prior to or during patient visit today. I spent a total of 38 minutes face to face with Shirley Platt  on 8/5/2021. Greater than 50% of this time was spent on counseling, reviewing and discussing test results, answering questions, instructions on treatment and/or medications ordered, and coordinating the care based on my plan and assessment as noted. Discharge Instructions         Visit Discharge/ physician orders:  -Do not use alcohol or baby wipes around stoma or skin around stoma.  -Check pouch every few hours, pouch must be emptied when 1/3 full to prevent pressure of pulling on appliance.  -2 extra pouches sent with patient today. Must order new pouches as soon as possible- order numbers below.      Supplies    Size    Order #      Oliver Soft Convex Drainable Pouch with Filter (1 piece)  1 1/2 inch (38 mm)  Pre-cut 8665   Adapt Paste   11525   Adapt Ostomy Belt    7299      Brava Stoma Powder         24885      Change your pouch        2                 times / week.     Application of Ileostomy Pouch:  1. Assemble above supplies in order of application before removing pouch. 2. Remove paper backing. 3. Remove your worn appliance by gently pulling away from skin and discard. 4. Wash skin with warm water, rinse and pat dry.   DO NOT USE SOAP!  5. Inspect your skin for redness or irritation. If present, apply a small amount of powder to skin around stoma. Brush off excess powder with a Kleenex. Do not use ointments. 1. __x_      Susanaane Fall Stoma Powder (69930)         (for wet, weepy skin)  6. Apply thin layer of paste just to the inner flange. (to fill in between the good skin and the stoma)  7. Pull skin above stoma up to make stoma rounded while applying pouch. Center the flange around your stoma. Smooth the adhesive collar to your abdomen. Hold in place with your hand for about 5 minutes to apply warmth to get good seal.  8. Apply belt.   9. Empty when 1/3 full.     Follow up: as needed  1 year follow up 8/5/22 at 1pm             Electronically signed by SYLVESTER Gan CNP on 8/5/2021 at 4:28 PM

## 2021-08-09 ENCOUNTER — HOSPITAL ENCOUNTER (EMERGENCY)
Age: 66
Discharge: LEFT BEFORE DISCHARGE | End: 2021-08-09
Payer: MEDICAID

## 2021-08-09 VITALS — TEMPERATURE: 98.1 F | OXYGEN SATURATION: 94 % | HEART RATE: 85 BPM

## 2021-08-17 ENCOUNTER — TELEPHONE (OUTPATIENT)
Dept: FAMILY MEDICINE CLINIC | Age: 66
End: 2021-08-17

## 2021-08-17 NOTE — TELEPHONE ENCOUNTER
----- Message from Mirian Carrion MA sent at 8/17/2021  8:23 AM EDT -----  Subject: Appointment Request    Reason for Call: Urgent (Patient Request) Existing Condition Follow    QUESTIONS  Type of Appointment? Established Patient  Reason for appointment request? No appointments available during search  Additional Information for Provider? Rt hand 1st finger swollen and red,   and painful, Also needs BP check and meds. Has [facility] COMM   PLAN. Please call asap.   ---------------------------------------------------------------------------  --------------  CALL BACK INFO  What is the best way for the office to contact you? OK to leave message on   voicemail  Preferred Call Back Phone Number? [phone]  ---------------------------------------------------------------------------  --------------  SCRIPT ANSWERS  Relationship to Patient? Self  (Is the patient requesting to be seen urgently for their symptoms?)? Yes  Is this follow up request related to routine Diabetes Management? No  Are you having any new concerns about your existing condition? Yes  Have you been diagnosed with, awaiting test results for, or told that you   are suspected of having COVID-19 (Coronavirus)? (If patient has tested   negative or was tested as a requirement for work, school, or travel and   not based on symptoms, answer no)? No  Do you currently have flu-like symptoms including fever or chills, cough,   shortness of breath, difficulty breathing, or new loss of taste or smell? No  Have you had close contact with someone with COVID-19 in the last 14 days? No  (Service Expert  click yes below to proceed with Sleep Number As Usual   Scheduling)?  Yes

## 2021-08-24 ENCOUNTER — OFFICE VISIT (OUTPATIENT)
Dept: FAMILY MEDICINE CLINIC | Age: 66
End: 2021-08-24
Payer: MEDICARE

## 2021-08-24 VITALS
HEIGHT: 63 IN | RESPIRATION RATE: 18 BRPM | DIASTOLIC BLOOD PRESSURE: 72 MMHG | BODY MASS INDEX: 22.68 KG/M2 | HEART RATE: 72 BPM | TEMPERATURE: 98.8 F | SYSTOLIC BLOOD PRESSURE: 152 MMHG | OXYGEN SATURATION: 97 % | WEIGHT: 128 LBS

## 2021-08-24 DIAGNOSIS — I10 UNCONTROLLED HYPERTENSION: Primary | ICD-10-CM

## 2021-08-24 DIAGNOSIS — Z13.220 SCREENING, LIPID: ICD-10-CM

## 2021-08-24 DIAGNOSIS — Z12.11 COLON CANCER SCREENING: ICD-10-CM

## 2021-08-24 DIAGNOSIS — B35.2 TINEA MANUS: ICD-10-CM

## 2021-08-24 DIAGNOSIS — Z12.31 ENCOUNTER FOR SCREENING MAMMOGRAM FOR MALIGNANT NEOPLASM OF BREAST: ICD-10-CM

## 2021-08-24 PROCEDURE — G8400 PT W/DXA NO RESULTS DOC: HCPCS | Performed by: FAMILY MEDICINE

## 2021-08-24 PROCEDURE — G8420 CALC BMI NORM PARAMETERS: HCPCS | Performed by: FAMILY MEDICINE

## 2021-08-24 PROCEDURE — 1090F PRES/ABSN URINE INCON ASSESS: CPT | Performed by: FAMILY MEDICINE

## 2021-08-24 PROCEDURE — 99214 OFFICE O/P EST MOD 30 MIN: CPT | Performed by: FAMILY MEDICINE

## 2021-08-24 PROCEDURE — 1123F ACP DISCUSS/DSCN MKR DOCD: CPT | Performed by: FAMILY MEDICINE

## 2021-08-24 PROCEDURE — G8427 DOCREV CUR MEDS BY ELIG CLIN: HCPCS | Performed by: FAMILY MEDICINE

## 2021-08-24 PROCEDURE — 4040F PNEUMOC VAC/ADMIN/RCVD: CPT | Performed by: FAMILY MEDICINE

## 2021-08-24 PROCEDURE — 4004F PT TOBACCO SCREEN RCVD TLK: CPT | Performed by: FAMILY MEDICINE

## 2021-08-24 PROCEDURE — 3017F COLORECTAL CA SCREEN DOC REV: CPT | Performed by: FAMILY MEDICINE

## 2021-08-24 RX ORDER — CLOTRIMAZOLE AND BETAMETHASONE DIPROPIONATE 10; .64 MG/G; MG/G
CREAM TOPICAL
Qty: 1 TUBE | Refills: 1 | Status: SHIPPED
Start: 2021-08-24 | End: 2021-09-02 | Stop reason: ALTCHOICE

## 2021-08-24 RX ORDER — LOSARTAN POTASSIUM 100 MG/1
100 TABLET ORAL DAILY
Qty: 30 TABLET | Refills: 0 | Status: ON HOLD
Start: 2021-08-24 | End: 2021-09-03 | Stop reason: SDUPTHER

## 2021-08-24 SDOH — ECONOMIC STABILITY: FOOD INSECURITY: WITHIN THE PAST 12 MONTHS, YOU WORRIED THAT YOUR FOOD WOULD RUN OUT BEFORE YOU GOT MONEY TO BUY MORE.: SOMETIMES TRUE

## 2021-08-24 SDOH — ECONOMIC STABILITY: FOOD INSECURITY: WITHIN THE PAST 12 MONTHS, THE FOOD YOU BOUGHT JUST DIDN'T LAST AND YOU DIDN'T HAVE MONEY TO GET MORE.: SOMETIMES TRUE

## 2021-08-24 ASSESSMENT — PATIENT HEALTH QUESTIONNAIRE - PHQ9
2. FEELING DOWN, DEPRESSED OR HOPELESS: 1
1. LITTLE INTEREST OR PLEASURE IN DOING THINGS: 0
SUM OF ALL RESPONSES TO PHQ QUESTIONS 1-9: 1
SUM OF ALL RESPONSES TO PHQ QUESTIONS 1-9: 1
SUM OF ALL RESPONSES TO PHQ9 QUESTIONS 1 & 2: 1
SUM OF ALL RESPONSES TO PHQ QUESTIONS 1-9: 1

## 2021-08-24 ASSESSMENT — SOCIAL DETERMINANTS OF HEALTH (SDOH): HOW HARD IS IT FOR YOU TO PAY FOR THE VERY BASICS LIKE FOOD, HOUSING, MEDICAL CARE, AND HEATING?: NOT HARD AT ALL

## 2021-08-24 NOTE — PATIENT INSTRUCTIONS
Patient Education        betamethasone and clotrimazole topical  Pronunciation:  BAY ta METH a sone and jeremyoe TRIM a zole TOP ik al  Brand:  Lotrisone  What is the most important information I should know about betamethasone and clotrimazole topical?  Follow all directions on your medicine label and package. Tell each of your healthcare providers about all your medical conditions, allergies, and all medicines you use. What is betamethasone and clotrimazole topical?  Betamethasone is highly potent steroid that reduces itching, swelling, and redness of the skin. Clotrimazole is an antifungal medication that fights infections caused by fungus. Betamethasone and clotrimazole topical (for the skin) is a combination medicine used to treat fungal skin infections such as athlete's foot, jock itch, and ringworm. Betamethasone and clotrimazole topical may also be used for purposes not listed in this medication guide. What should I discuss with my healthcare provider before using betamethasone and clotrimazole topical?  You may not be able to use this medicine if you are allergic to betamethasone or clotrimazole, or if you have ever had an allergic reaction to:  · other steroid medicines (such as dexamethasone, fludrocortisone, hydrocortisone, prednisone, triamcinolone, and others); or  · other antifungal medicines (such as itraconazole, ketoconazole, miconazole, posaconazole, or voriconazole). Tell your doctor if you have ever had:  · a skin reaction to any steroid medicine;  · cataract or glaucoma;  · liver disease; or  · an adrenal gland disorder. Steroid medicines can increase the glucose (sugar) levels in your blood or urine. Tell your doctor if you have diabetes. Using highly potent steroid medicine during pregnancy may increase the risk of low birthweight in the  baby. Tell your doctor if you are pregnant or plan to become pregnant. It may not be safe to breastfeed while using this medicine.  Ask your doctor about any risk. If you apply betamethasone and clotrimazole to your chest, avoid areas that may come into contact with the baby's mouth. If you are pregnant or breastfeeding, apply this medicine to the smallest skin area and for the shortest amount of time possible to treat your condition. This medicine is not approved for use by anyone younger than 16years old. Children can absorb larger amounts of this medicine through the skin and may be more likely to have side effects. How should I use betamethasone and clotrimazole topical?  Follow all directions on your prescription label and read all medication guides or instruction sheets. Use the medicine exactly as directed. Do not take by mouth. Topical medicine is for use only on the skin. Read and carefully follow any Instructions for Use provided with your medicine. Ask your doctor or pharmacist if you do not understand these instructions. Wash your hands before and after using betamethasone and clotrimazole, unless you are using this medicine to treat the skin on your hands. Apply a thin layer of medicine to the affected skin and rub it in gently. Do not apply this medicine over a large area of skin unless your doctor has told you to. Keep your skin clean and dry to avoid further infection. Do not cover the treated skin area with a bandage or other covering unless your doctor tells you to. Covering treated areas can increase the amount of medicine absorbed through your skin and may cause harmful effects. Shake betamethasone and clotrimazole lotion well just before each use. If you are treating the diaper area, do not use plastic pants or tight-fitting diapers. Do not use betamethasone and clotrimazole topical to treat diaper rash. Use this medicine for the full prescribed length of time, even if your symptoms quickly improve.   Call your doctor if your symptoms do not improve after 1 week of using this medicine (or after 2 weeks if using the medicine on your feet). Store at room temperature away from moisture and heat. Store the lotion in an upright position. What happens if I miss a dose? Apply the medicine as soon as you can, but skip the missed dose if it is almost time for your next dose. Do not apply two doses at one time. What happens if I overdose? Seek emergency medical attention or call the Poison Help line at 1-489.353.8573 if anyone has accidentally swallowed the medication. High doses or long-term use of betamethasone and clotrimazole topical can lead to thinning skin, easy bruising, changes in body fat (especially in your face, neck, back, and waist), increased acne or facial hair, menstrual problems, impotence, or loss of interest in sex. What should I avoid while using betamethasone and clotrimazole topical?  Avoid getting this medicine in your eyes, mouth, or vagina. Do not use betamethasone and clotrimazole topical to treat any skin condition that has not been checked by your doctor. Avoid using other topical steroid medications on the areas you treat with betamethasone and clotrimazole, unless your doctor tells you to. What are the possible side effects of betamethasone and clotrimazole topical?  Get emergency medical help if you have signs of an allergic reaction: hives; difficulty breathing; swelling of your face, lips, tongue, or throat.   Call your doctor at once if you have:  · worsening of your skin condition;  · redness, warmth, swelling, oozing, or severe irritation of any treated skin;  · blurred vision, tunnel vision, eye pain, or seeing halos around lights;  · high blood sugar --increased thirst, increased urination, dry mouth, fruity breath odor; or  · possible signs of absorbing this medicine through your skin --weight gain (especially in your face or your upper back and torso), slow wound healing, thinning or discolored skin, increased body hair, muscle weakness, nausea, diarrhea, tiredness, mood changes, menstrual changes, sexual changes. Common side effects may include:  · numbness, tingling, or stinging;  · skin dryness or rash;  · swelling; or  · new infections. This is not a complete list of side effects and others may occur. Call your doctor for medical advice about side effects. You may report side effects to FDA at 7-864-HKI-3378. What other drugs will affect betamethasone and clotrimazole topical?  Medicine used on the skin is not likely to be affected by other drugs you use. But many drugs can interact with each other. Tell each of your healthcare providers about all medicines you use, including prescription and over-the-counter medicines, vitamins, and herbal products. Where can I get more information? Your pharmacist can provide more information about betamethasone and clotrimazole topical.  Remember, keep this and all other medicines out of the reach of children, never share your medicines with others, and use this medication only for the indication prescribed. Every effort has been made to ensure that the information provided by [address] is accurate, up-to-date, and complete, but no guarantee is made to that effect. Drug information contained herein may be time sensitive. [facility] information has been compiled for use by healthcare practitioners and consumers in the [place] and therefore [facility] does not warrant that uses outside of the [place] are appropriate, unless specifically indicated otherwise. [facility]'s drug information does not endorse drugs, diagnose patients or recommend therapy. [facility] drug information is an informational resource designed to assist licensed healthcare practitioners in caring for their patients and/or to serve consumers viewing this service as a supplement to, and not a substitute for, the expertise, skill, knowledge and judgment of healthcare practitioners.  The absence of a warning for a given drug or drug combination in no way should be construed to indicate that the drug or drug combination is safe, effective or appropriate for any given patient. [facility] does not assume any responsibility for any aspect of healthcare administered with the aid of information [facility] provides. The information contained herein is not intended to cover all possible uses, directions, precautions, warnings, drug interactions, allergic reactions, or adverse effects. If you have questions about the drugs you are taking, check with your doctor, nurse or pharmacist.  Copyright 6180-9353 [address]. Version: 5.03. Revision date: 2/17/2020. Care instructions adapted under license by [facility] ([facility]). If you have questions about a medical condition or this instruction, always ask your healthcare professional. [place] [address] any warranty or liability for your use of this information.

## 2021-08-24 NOTE — PROGRESS NOTES
Hypertension:  Patient is here for follow up chronic hypertension. This is not generally controlled on current medication regimen. She has been out of her medication for months. Most recent labs reviewed with patient and are not remarkable. No symptoms from htn standpoint per ROS. Patient is not compliant with lifestyle modifications. Patient does smoke. Comorbid conditions include Hepatitis C. Rash:  Patient is here today with complaints of a rash. This has been going on for 1 year(s). Rash is located on right index finger. It is not spreading. Exacerbating factors include everything. Alleviating factors include nothing. Associated signs and symptoms include itchy, with blisters and clear drainage. Patient has not changed hygiene products. Patient does not have pets. This has not happened to patient inthe past.  Patient has had recent travel. Her daughter gave her triamcinolone cream which is not helping. She has soaked it in bleach with no improvement. She states that she has a hernia that is getting worse. She is having pain in her right lower abdomen. She states that it causes shortness of breath at times. Patient's past medical, surgical, social and/or family history reviewed, updated in chart, and are non-contributory (unless otherwise stated). Medications and allergies also reviewed and updated in chart.         Review of Systems:  Constitutional:  No fever, no fatigue, no chills, no headaches, no weight change  Dermatology:  + rash, no mole, no dry or sensitive skin  ENT:  No cough, no sore throat, no sinus pain, no runny nose, no ear pain  Cardiology:  No chest pain, no palpitations, no leg edema, + shortness of breath, no PND  Gastroenterology:  No dysphagia, + abdominal pain, + nausea, no vomiting, no constipation, + diarrhea, no heartburn  Musculoskeletal:  + joint pain, no leg cramps, no back pain, no muscle aches  Respiratory:  + shortness of breath, no orthopnea, no wheezing, no ODOM, no hemoptysis  Urology:  No blood in the urine, no urinary frequency, no urinary incontinence, no urinary urgency, no nocturia, no dysuria      Vitals:    08/24/21 1410 08/24/21 1431   BP: (!) 176/84 (!) 152/72   Site:  Right Upper Arm   Position:  Sitting   Cuff Size:  Medium Adult   Pulse: 72    Resp: 18    Temp: 98.8 °F (37.1 °C)    TempSrc: Temporal    SpO2: 97%    Weight: 128 lb (58.1 kg)    Height: 5' 3\" (1.6 m)        Physical Exam  Vitals and nursing note reviewed. Constitutional:       Appearance: She is well-developed. HENT:      Head: Normocephalic and atraumatic. Right Ear: External ear normal.      Left Ear: External ear normal.      Nose: Nose normal.   Eyes:      Conjunctiva/sclera: Conjunctivae normal.      Pupils: Pupils are equal, round, and reactive to light. Neck:      Thyroid: No thyromegaly. Cardiovascular:      Rate and Rhythm: Normal rate and regular rhythm. Heart sounds: Normal heart sounds. Pulmonary:      Effort: Pulmonary effort is normal.      Breath sounds: Normal breath sounds. No wheezing. Abdominal:      General: Bowel sounds are normal.      Palpations: Abdomen is soft. Tenderness: There is abdominal tenderness. Musculoskeletal:         General: Normal range of motion. Cervical back: Normal range of motion and neck supple. Skin:     General: Skin is warm and dry. Findings: Rash (scaly rash right index finger) present. Neurological:      Mental Status: She is alert and oriented to person, place, and time. Deep Tendon Reflexes: Reflexes are normal and symmetric. Psychiatric:         Behavior: Behavior normal.         Assessment/Plan:      Mack Croft was seen today for hypertension and rash. Diagnoses and all orders for this visit:    Uncontrolled hypertension  -     losartan (COZAAR) 100 MG tablet;  Take 1 tablet by mouth daily  -     CBC Auto Differential; Future  -     Comprehensive Metabolic Panel; Future  -     TSH without Reflex; Future  Restart cozaar  Side effects reviewed  Diet and exercise were discussed and recommended to the patient. Labs ordered    Colon cancer screening  -     Leslie Wang MD, General Surgery, [place]    Tinea manus  -     clotrimazole-betamethasone (LOTRISONE) 1-0.05 % cream; Apply topically 2 times daily. Screening, lipid  -     Lipid Panel; Future    Encounter for screening mammogram for malignant neoplasm of breast  -     BETSY DIGITAL SCREEN BILATERAL PER PROTOCOL; Future      As above. Call or go to ED immediately if symptoms worsen or persist.  Return in about 2 days (around 8/26/2021) for htn., or sooner if necessary. Educational materials and/or home exercises printed for patient's review and were included in patient instructions on his/her After Visit Summary and given to patient at the end of visit. Counseled regarding above diagnosis, including possible risks and complications,  especially if left uncontrolled. Counseled regarding the possible side effects, risks, benefits and alternatives to treatment; patient and/or guardian verbalizes understanding, agrees, feels comfortable with and wishes to proceed with above treatment plan. Advised patient to call with any new medication issues, and read all Rx info from pharmacy to assure aware of all possible risks and side effects of medication before taking. Reviewed age and gender appropriate health screening exams and vaccinations. Advised patient regarding importance of keeping up with recommended health maintenance and to schedule as soon as possible if overdue, as this is important in assessing for undiagnosed pathology, especially cancer, as well as protecting against potentially harmful/life threatening disease. Patient and/or guardian verbalizes understanding and agrees with above counseling, assessment and plan. All questions answered.     Ge Wetzel, DO  8/24/2021    I have personally reviewed and updated the chief complaint, HPI, Past Medical, Family and Social History, as well as the above Review of Systems.

## 2021-09-01 ENCOUNTER — TELEPHONE (OUTPATIENT)
Dept: FAMILY MEDICINE CLINIC | Age: 66
End: 2021-09-01

## 2021-09-01 NOTE — TELEPHONE ENCOUNTER
Pt called the office and states the cream is not working for the rash on her index finger. Pt states she does not want to make an appointment to come in to office if she does not have to. Pt is asking if another medication can be called in or what the provider would recommend.

## 2021-09-02 ENCOUNTER — OFFICE VISIT (OUTPATIENT)
Dept: FAMILY MEDICINE CLINIC | Age: 66
End: 2021-09-02
Payer: MEDICARE

## 2021-09-02 ENCOUNTER — TELEPHONE (OUTPATIENT)
Dept: FAMILY MEDICINE CLINIC | Age: 66
End: 2021-09-02

## 2021-09-02 ENCOUNTER — APPOINTMENT (OUTPATIENT)
Dept: GENERAL RADIOLOGY | Age: 66
End: 2021-09-02
Payer: COMMERCIAL

## 2021-09-02 ENCOUNTER — HOSPITAL ENCOUNTER (OUTPATIENT)
Age: 66
Setting detail: OBSERVATION
Discharge: HOME | End: 2021-09-03
Attending: EMERGENCY MEDICINE | Admitting: FAMILY MEDICINE
Payer: COMMERCIAL

## 2021-09-02 VITALS
WEIGHT: 128 LBS | DIASTOLIC BLOOD PRESSURE: 94 MMHG | RESPIRATION RATE: 18 BRPM | HEIGHT: 63 IN | SYSTOLIC BLOOD PRESSURE: 172 MMHG | BODY MASS INDEX: 22.68 KG/M2 | TEMPERATURE: 96.6 F | HEART RATE: 74 BPM | OXYGEN SATURATION: 97 %

## 2021-09-02 DIAGNOSIS — I16.0 HYPERTENSIVE URGENCY: Primary | ICD-10-CM

## 2021-09-02 DIAGNOSIS — I10 UNCONTROLLED HYPERTENSION: ICD-10-CM

## 2021-09-02 DIAGNOSIS — R42 DIZZINESS: ICD-10-CM

## 2021-09-02 DIAGNOSIS — B35.2 TINEA MANUS: ICD-10-CM

## 2021-09-02 DIAGNOSIS — R94.31 ABNORMAL EKG: ICD-10-CM

## 2021-09-02 LAB
ALBUMIN SERPL-MCNC: 4 G/DL (ref 3.5–5.2)
ALP BLD-CCNC: 87 U/L (ref 35–104)
ALT SERPL-CCNC: 14 U/L (ref 0–32)
ANION GAP SERPL CALCULATED.3IONS-SCNC: 9 MMOL/L (ref 7–16)
AST SERPL-CCNC: 19 U/L (ref 0–31)
BASOPHILS ABSOLUTE: 0.02 E9/L (ref 0–0.2)
BASOPHILS RELATIVE PERCENT: 0.4 % (ref 0–2)
BILIRUB SERPL-MCNC: 0.9 MG/DL (ref 0–1.2)
BUN BLDV-MCNC: 15 MG/DL (ref 6–23)
CALCIUM SERPL-MCNC: 10 MG/DL (ref 8.6–10.2)
CHLORIDE BLD-SCNC: 105 MMOL/L (ref 98–107)
CO2: 26 MMOL/L (ref 22–29)
CREAT SERPL-MCNC: 0.9 MG/DL (ref 0.5–1)
EOSINOPHILS ABSOLUTE: 0.1 E9/L (ref 0.05–0.5)
EOSINOPHILS RELATIVE PERCENT: 1.9 % (ref 0–6)
GFR AFRICAN AMERICAN: >60
GFR NON-AFRICAN AMERICAN: >60 ML/MIN/1.73
GLUCOSE BLD-MCNC: 87 MG/DL (ref 74–99)
HCT VFR BLD CALC: 44.1 % (ref 34–48)
HEMOGLOBIN: 15.4 G/DL (ref 11.5–15.5)
IMMATURE GRANULOCYTES #: 0.01 E9/L
IMMATURE GRANULOCYTES %: 0.2 % (ref 0–5)
LYMPHOCYTES ABSOLUTE: 1.63 E9/L (ref 1.5–4)
LYMPHOCYTES RELATIVE PERCENT: 30.9 % (ref 20–42)
MAGNESIUM: 2.2 MG/DL (ref 1.6–2.6)
MCH RBC QN AUTO: 31.8 PG (ref 26–35)
MCHC RBC AUTO-ENTMCNC: 34.9 % (ref 32–34.5)
MCV RBC AUTO: 91.1 FL (ref 80–99.9)
MONOCYTES ABSOLUTE: 0.51 E9/L (ref 0.1–0.95)
MONOCYTES RELATIVE PERCENT: 9.7 % (ref 2–12)
NEUTROPHILS ABSOLUTE: 3.01 E9/L (ref 1.8–7.3)
NEUTROPHILS RELATIVE PERCENT: 56.9 % (ref 43–80)
PDW BLD-RTO: 12.6 FL (ref 11.5–15)
PLATELET # BLD: 103 E9/L (ref 130–450)
PMV BLD AUTO: 10.9 FL (ref 7–12)
POTASSIUM SERPL-SCNC: 3.6 MMOL/L (ref 3.5–5)
PRO-BNP: 619 PG/ML (ref 0–125)
RBC # BLD: 4.84 E12/L (ref 3.5–5.5)
SODIUM BLD-SCNC: 140 MMOL/L (ref 132–146)
TOTAL PROTEIN: 6.9 G/DL (ref 6.4–8.3)
TROPONIN, HIGH SENSITIVITY: 7 NG/L (ref 0–9)
WBC # BLD: 5.3 E9/L (ref 4.5–11.5)

## 2021-09-02 PROCEDURE — 1123F ACP DISCUSS/DSCN MKR DOCD: CPT | Performed by: FAMILY MEDICINE

## 2021-09-02 PROCEDURE — 83880 ASSAY OF NATRIURETIC PEPTIDE: CPT

## 2021-09-02 PROCEDURE — 93000 ELECTROCARDIOGRAM COMPLETE: CPT | Performed by: FAMILY MEDICINE

## 2021-09-02 PROCEDURE — 3017F COLORECTAL CA SCREEN DOC REV: CPT | Performed by: FAMILY MEDICINE

## 2021-09-02 PROCEDURE — 71046 X-RAY EXAM CHEST 2 VIEWS: CPT

## 2021-09-02 PROCEDURE — 80053 COMPREHEN METABOLIC PANEL: CPT

## 2021-09-02 PROCEDURE — G8427 DOCREV CUR MEDS BY ELIG CLIN: HCPCS | Performed by: FAMILY MEDICINE

## 2021-09-02 PROCEDURE — G8400 PT W/DXA NO RESULTS DOC: HCPCS | Performed by: FAMILY MEDICINE

## 2021-09-02 PROCEDURE — 99214 OFFICE O/P EST MOD 30 MIN: CPT | Performed by: FAMILY MEDICINE

## 2021-09-02 PROCEDURE — 83036 HEMOGLOBIN GLYCOSYLATED A1C: CPT

## 2021-09-02 PROCEDURE — G8420 CALC BMI NORM PARAMETERS: HCPCS | Performed by: FAMILY MEDICINE

## 2021-09-02 PROCEDURE — 6370000000 HC RX 637 (ALT 250 FOR IP): Performed by: EMERGENCY MEDICINE

## 2021-09-02 PROCEDURE — 4040F PNEUMOC VAC/ADMIN/RCVD: CPT | Performed by: FAMILY MEDICINE

## 2021-09-02 PROCEDURE — 4004F PT TOBACCO SCREEN RCVD TLK: CPT | Performed by: FAMILY MEDICINE

## 2021-09-02 PROCEDURE — 99284 EMERGENCY DEPT VISIT MOD MDM: CPT

## 2021-09-02 PROCEDURE — 1090F PRES/ABSN URINE INCON ASSESS: CPT | Performed by: FAMILY MEDICINE

## 2021-09-02 PROCEDURE — 83735 ASSAY OF MAGNESIUM: CPT

## 2021-09-02 PROCEDURE — 84484 ASSAY OF TROPONIN QUANT: CPT

## 2021-09-02 PROCEDURE — 85025 COMPLETE CBC W/AUTO DIFF WBC: CPT

## 2021-09-02 PROCEDURE — 93005 ELECTROCARDIOGRAM TRACING: CPT | Performed by: PHYSICIAN ASSISTANT

## 2021-09-02 RX ORDER — TRIAMCINOLONE ACETONIDE 0.25 MG/G
CREAM TOPICAL
Qty: 1 EACH | Refills: 1 | Status: SHIPPED
Start: 2021-09-02 | End: 2021-09-02 | Stop reason: ALTCHOICE

## 2021-09-02 RX ORDER — ASPIRIN 81 MG/1
324 TABLET, CHEWABLE ORAL ONCE
Status: COMPLETED | OUTPATIENT
Start: 2021-09-02 | End: 2021-09-02

## 2021-09-02 RX ORDER — CLONIDINE HYDROCHLORIDE 0.1 MG/1
0.1 TABLET ORAL ONCE
Status: COMPLETED | OUTPATIENT
Start: 2021-09-02 | End: 2021-09-02

## 2021-09-02 RX ORDER — CLONIDINE HYDROCHLORIDE 0.1 MG/1
0.2 TABLET ORAL ONCE
Status: COMPLETED | OUTPATIENT
Start: 2021-09-02 | End: 2021-09-02

## 2021-09-02 RX ADMIN — CLONIDINE HYDROCHLORIDE 0.1 MG: 0.1 TABLET ORAL at 15:01

## 2021-09-02 RX ADMIN — CLONIDINE HYDROCHLORIDE 0.2 MG: 0.1 TABLET ORAL at 20:27

## 2021-09-02 RX ADMIN — ASPIRIN 324 MG: 81 TABLET, CHEWABLE ORAL at 23:37

## 2021-09-02 RX ADMIN — NITROGLYCERIN 0.5 INCH: 20 OINTMENT TOPICAL at 23:38

## 2021-09-02 ASSESSMENT — PAIN DESCRIPTION - PAIN TYPE: TYPE: ACUTE PAIN

## 2021-09-02 ASSESSMENT — PAIN DESCRIPTION - ORIENTATION: ORIENTATION: RIGHT

## 2021-09-02 ASSESSMENT — PAIN SCALES - GENERAL: PAINLEVEL_OUTOF10: 10

## 2021-09-02 ASSESSMENT — PAIN DESCRIPTION - LOCATION: LOCATION: ARM

## 2021-09-02 NOTE — ED NOTES
FIRST PROVIDER CONTACT ASSESSMENT NOTE                                                                                                Department of Emergency Medicine                                                      First Provider Note  21  6:00 PM EDT  NAME: Travis Conner  : 1955  MRN: [id number]    Chief Complaint: Abnormal Test Results (PCP sent in for NSTEMI due to Twave changes and hasn't been on her HTN meds and her BP is high) and Hypertension      History of Present Illness:   Travis Conner is a 72 y.o. female who presents to the ED for hypertensive urgency. Patient states that she is at her family doctors and she had an abnormal EKG therefore they sent her in for further evaluation. Is not been compliant with her medications    Focused Physical Exam:  VS:    ED Triage Vitals   BP Temp Temp src Pulse Resp SpO2 Height Weight   21 1734 21 1713 -- 21 1713 21 1734 21 1713 21 1734 21 1734   (!) 211/105 97.5 °F (36.4 °C)  67 16 97 % 5' 3\" (1.6 m) 128 lb (58.1 kg)        General: Alert and in no apparent distress. Medical History:  has a past medical history of HTN (hypertension). Surgical History:  has a past surgical history that includes Cholecystectomy (); partial hysterectomy (cervix not removed) (); Tonsillectomy; and Hemorrhoid surgery. Social History:  reports that she has been smoking cigarettes. She has a 2.50 pack-year smoking history. She has never used smokeless tobacco. She reports current alcohol use. She reports that she does not use drugs. Family History: family history includes COPD in her sister; Jenkobi Ahujaan in her father; No Known Problems in her brother, brother, daughter, daughter, daughter, maternal grandfather, maternal grandmother, mother, paternal grandfather, paternal grandmother, sister, son, and son; Other in her sister.     Allergies: Morphine     Initial Plan of Care:  Initiate Treatment-Testing, Proceed

## 2021-09-02 NOTE — ED NOTES
Bed: 11  Expected date:   Expected time:   Means of arrival:   Comments:  Triage      Kirby Faith RN  09/02/21 [id number]

## 2021-09-02 NOTE — PROGRESS NOTES
Rash:  Patient is here today with complaints of a rash. This has been going on for about a year. Rash is located on right index. It is spreading to her left arm. Exacerbating factors include everything. Alleviating factors include nothing. Patient has not changed hygiene products. Patient does not have pets. This has happened to patient in the past.  Patient has not had recent travel. Hypertension:   Patient is here for follow up chronic hypertension. Patient is not compliant with lifestyle modifications. Patient is not well controlled. Patient denies chest pain, diaphoresis, dyspnea, dyspnea on exertion, peripheral edema, palpitations, HA, visual issues. Cardiovascular risk factors: advanced age (older than 54 for men, 72 for women) and hypertension. Patient does smoke. Is currently on cozaar 100 mg daily. Taking as prescribed. No adverse effects. She states that she has been feeling dizzy and tired today. She states that she forgot to take her cozaar today. She states that if she sits down and drinks water, she is fine. Patient's past medical, surgical, social and/or family history reviewed, updated in chart, and are non-contributory (unless otherwise stated). Medications and allergies also reviewed and updated in chart.       Review of Systems:  Constitutional:  No fever, no fatigue, no chills, no headaches, no weight change + dizziness  Dermatology:  + rash, no mole, no dry or sensitive skin  ENT:  No cough, no sore throat, no sinus pain, no runny nose, no ear pain  Cardiology:  No chest pain, no palpitations, no leg edema, no shortness of breath, no PND  Gastroenterology:  No dysphagia, no abdominal pain, + nausea, no vomiting, no constipation, no diarrhea, no heartburn  Musculoskeletal:  No joint pain, no leg cramps, no back pain, no muscle aches  Respiratory:  No shortness of breath, no orthopnea, no wheezing, no ODOM, no hemoptysis  Urology:  No blood in the urine, no urinary frequency, no urinary incontinence, no urinary urgency, no nocturia, no dysuria      Vitals:    09/02/21 1436 09/02/21 1453 09/02/21 1515 09/02/21 1539   BP: (!) 220/108 (!) 224/110 (!) 220/100 (!) 172/94   Pulse: 74      Resp: 18      Temp: 96.6 °F (35.9 °C)      TempSrc: Temporal      SpO2: 97%      Weight: 128 lb (58.1 kg)      Height: 5' 3\" (1.6 m)          Physical Exam  Vitals and nursing note reviewed. Constitutional:       Appearance: She is well-developed. HENT:      Head: Normocephalic and atraumatic. Right Ear: External ear normal.      Left Ear: External ear normal.      Nose: Nose normal.   Eyes:      Conjunctiva/sclera: Conjunctivae normal.      Pupils: Pupils are equal, round, and reactive to light. Neck:      Thyroid: No thyromegaly. Cardiovascular:      Rate and Rhythm: Normal rate and regular rhythm. Heart sounds: Normal heart sounds. Pulmonary:      Effort: Pulmonary effort is normal.      Breath sounds: Normal breath sounds. No wheezing. Abdominal:      General: Bowel sounds are normal.      Palpations: Abdomen is soft. Musculoskeletal:         General: Normal range of motion. Cervical back: Normal range of motion and neck supple. Skin:     General: Skin is warm and dry. Findings: Rash (scaly rash right index finger) present. Neurological:      Mental Status: She is alert and oriented to person, place, and time. Deep Tendon Reflexes: Reflexes are normal and symmetric. Psychiatric:         Behavior: Behavior normal.         Assessment/Plan:      Elio Ordoñez was seen today for rash. Diagnoses and all orders for this visit:    Hypertensive urgency  -     cloNIDine (CATAPRES) tablet 0.1 mg  -     EKG 12 Lead  Clonidine given in the office  EKG showed sinus bradycardia with nonspecific t wave changes. She is symptomatic  She refused ambulance transport  Will have her daughter take her to the ED for further evaluation.   She has been noncompliant with blood pressure meds. Tinea manus  -     miconazole (MICOTIN) 2 % cream; Apply topically 2 times daily. -     triamcinolone (KENALOG) 0.025 % cream; Apply topically 2 times daily. As above. Call or go to ED immediately if symptoms worsen or persist.  Return if symptoms worsen or fail to improve. , or sooner if necessary. Educational materials and/or home exercises printed for patient's review and were included in patient instructions on his/her After Visit Summary and given to patient at the end of visit. Counseled regarding above diagnosis, including possible risks and complications,  especially if left uncontrolled. Counseled regarding the possible side effects, risks, benefits and alternatives to treatment; patient and/or guardian verbalizes understanding, agrees, feels comfortable with and wishes to proceed with above treatment plan. Advised patient to call with any new medication issues, and read all Rx info from pharmacy to assure aware of all possible risks and side effects of medication before taking. Reviewed age and gender appropriate health screening exams and vaccinations. Advised patient regarding importance of keeping up with recommended health maintenance and to schedule as soon as possible if overdue, as this is important in assessing for undiagnosed pathology, especially cancer, as well as protecting against potentially harmful/life threatening disease. Patient and/or guardian verbalizes understanding and agrees with above counseling, assessment and plan. All questions answered. Hieu Wong DO  9/2/2021    I have personally reviewed and updated the chief complaint, HPI, Past Medical, Family and Social History, as well as the above Review of Systems.

## 2021-09-02 NOTE — PATIENT INSTRUCTIONS
take by mouth. Miconazole topical is for use only on the skin. Do not use this medicine on open wounds. Miconazole topical is not for use in the vagina or rectum. Wash your hands before and after using this medication, unless you are treating a skin condition on your hands. Clean and dry the affected area. Apply the cream, lotion, spray, or powder once or twice daily as directed for 2 to 4 weeks. Do not cover the treated skin area unless your doctor tells you to. A light cotton-gauze dressing may be used to protect clothing. Use this medication for the full prescribed length of time. Your symptoms may improve before the infection is completely cleared. Skipping doses may also increase your risk of further infection that is resistant to antifungal medicine. Call your doctor if the infection does not clear up in 2 weeks (or 4 weeks for athlete's foot), or if it appears to get worse. Store at room temperature away from moisture and heat. Keep the tube tightly closed when not in use. What happens if I miss a dose? Apply the missed dose as soon as you remember. Skip the missed dose if it is almost time for your next scheduled dose. Do not use extra medicine to make up the missed dose. What happens if I overdose? Seek emergency medical attention or call the Poison Help line at 1-591.445.8708. What should I avoid while using miconazole topical?  Avoid getting this medication in your eyes, nose, or mouth. Avoid wearing tight-fitting, synthetic clothing that doesn't allow air circulation. Wear loose-fitting clothing made of cotton and other natural fibers until the infection is healed. What are the possible side effects of miconazole topical?  Get emergency medical help if you have signs of an allergic reaction: hives; difficult breathing; swelling of your face, lips, tongue, or throat.   Stop using miconazole topical and call your doctor at once if you have:  · severe blistering, redness, or irritation of treated skin. Common side effects may include:  · itching, peeling, or dry skin. Although the risk of serious side effects is low when miconazole topical is applied to the skin, side effects can occur if the medicine is absorbed into your bloodstream, including:  · dry mouth, sore tongue, tooth pain, red or swollen gums;  · altered sense of taste;  · nausea, diarrhea; or  · headache. This is not a complete list of side effects and others may occur. Call your doctor for medical advice about side effects. You may report side effects to FDA at 1-057-FDA-4619. What other drugs will affect miconazole topical?  It is not likely that other drugs you take orally or inject will have an effect on topically applied miconazole. But many drugs can interact with each other. Tell each of your health care providers about all medicines you use, including prescription and over-the-counter medicines, vitamins, and herbal products. Where can I get more information? Your pharmacist can provide more information about miconazole topical.  Remember, keep this and all other medicines out of the reach of children, never share your medicines with others, and use this medication only for the indication prescribed. Every effort has been made to ensure that the information provided by [address] is accurate, up-to-date, and complete, but no guarantee is made to that effect. Drug information contained herein may be time sensitive. [employer] information has been compiled for use by healthcare practitioners and consumers in the [place] and therefore [employer] does not warrant that uses outside of the [place] are appropriate, unless specifically indicated otherwise. [facility]'s drug information does not endorse drugs, diagnose patients or recommend therapy.  [employer] [employer] drug information is an informational resource designed to assist licensed healthcare practitioners in caring for their patients and/or to serve consumers viewing this service as a supplement to, and not a substitute for, the expertise, skill, knowledge and judgment of healthcare practitioners. The absence of a warning for a given drug or drug combination in no way should be construed to indicate that the drug or drug combination is safe, effective or appropriate for any given patient. [facility] does not assume any responsibility for any aspect of healthcare administered with the aid of information [facility] provides. The information contained herein is not intended to cover all possible uses, directions, precautions, warnings, drug interactions, allergic reactions, or adverse effects. If you have questions about the drugs you are taking, check with your doctor, nurse or pharmacist.  Copyright 6833-4486 167  Anton: 4.02. Revision date: 9/13/2016. Care instructions adapted under license by [facility] ([facility]). If you have questions about a medical condition or this instruction, always ask your healthcare professional. [place] [address] any warranty or liability for your use of this information.

## 2021-09-02 NOTE — TELEPHONE ENCOUNTER
Called nurse to nurse to [facility] at [address]. Patient being transported by private vehicle. Patient sent for hypertensive urgency with symptoms.

## 2021-09-03 ENCOUNTER — APPOINTMENT (OUTPATIENT)
Dept: NON INVASIVE DIAGNOSTICS | Age: 66
End: 2021-09-03
Payer: COMMERCIAL

## 2021-09-03 ENCOUNTER — APPOINTMENT (OUTPATIENT)
Dept: NUCLEAR MEDICINE | Age: 66
End: 2021-09-03
Payer: COMMERCIAL

## 2021-09-03 VITALS
BODY MASS INDEX: 23.41 KG/M2 | OXYGEN SATURATION: 98 % | SYSTOLIC BLOOD PRESSURE: 104 MMHG | HEIGHT: 62 IN | DIASTOLIC BLOOD PRESSURE: 66 MMHG | TEMPERATURE: 98.4 F | WEIGHT: 127.2 LBS | RESPIRATION RATE: 15 BRPM | HEART RATE: 74 BPM

## 2021-09-03 PROBLEM — I16.0 HYPERTENSIVE URGENCY: Status: ACTIVE | Noted: 2021-09-03

## 2021-09-03 LAB
CHOLESTEROL, TOTAL: 121 MG/DL (ref 0–199)
EKG ATRIAL RATE: 56 BPM
EKG P AXIS: 55 DEGREES
EKG P-R INTERVAL: 146 MS
EKG Q-T INTERVAL: 418 MS
EKG QRS DURATION: 86 MS
EKG QTC CALCULATION (BAZETT): 403 MS
EKG R AXIS: -4 DEGREES
EKG T AXIS: -18 DEGREES
EKG VENTRICULAR RATE: 56 BPM
HBA1C MFR BLD: 4.3 % (ref 4–5.6)
HDLC SERPL-MCNC: 49 MG/DL
LDL CHOLESTEROL CALCULATED: 51 MG/DL (ref 0–99)
LV EF: 76 %
LVEF MODALITY: NORMAL
TRIGL SERPL-MCNC: 104 MG/DL (ref 0–149)
TROPONIN, HIGH SENSITIVITY: 9 NG/L (ref 0–9)
VLDLC SERPL CALC-MCNC: 21 MG/DL

## 2021-09-03 PROCEDURE — 93017 CV STRESS TEST TRACING ONLY: CPT

## 2021-09-03 PROCEDURE — 6370000000 HC RX 637 (ALT 250 FOR IP): Performed by: FAMILY MEDICINE

## 2021-09-03 PROCEDURE — A9500 TC99M SESTAMIBI: HCPCS | Performed by: RADIOLOGY

## 2021-09-03 PROCEDURE — G0378 HOSPITAL OBSERVATION PER HR: HCPCS

## 2021-09-03 PROCEDURE — 78452 HT MUSCLE IMAGE SPECT MULT: CPT

## 2021-09-03 PROCEDURE — 84484 ASSAY OF TROPONIN QUANT: CPT

## 2021-09-03 PROCEDURE — 93018 CV STRESS TEST I&R ONLY: CPT | Performed by: INTERNAL MEDICINE

## 2021-09-03 PROCEDURE — 6370000000 HC RX 637 (ALT 250 FOR IP): Performed by: NURSE PRACTITIONER

## 2021-09-03 PROCEDURE — 96374 THER/PROPH/DIAG INJ IV PUSH: CPT

## 2021-09-03 PROCEDURE — 6360000002 HC RX W HCPCS: Performed by: FAMILY MEDICINE

## 2021-09-03 PROCEDURE — 36415 COLL VENOUS BLD VENIPUNCTURE: CPT

## 2021-09-03 PROCEDURE — 3430000000 HC RX DIAGNOSTIC RADIOPHARMACEUTICAL: Performed by: RADIOLOGY

## 2021-09-03 PROCEDURE — 93016 CV STRESS TEST SUPVJ ONLY: CPT | Performed by: INTERNAL MEDICINE

## 2021-09-03 PROCEDURE — 80061 LIPID PANEL: CPT

## 2021-09-03 PROCEDURE — 78452 HT MUSCLE IMAGE SPECT MULT: CPT | Performed by: INTERNAL MEDICINE

## 2021-09-03 PROCEDURE — 6360000002 HC RX W HCPCS: Performed by: NURSE PRACTITIONER

## 2021-09-03 RX ORDER — LOSARTAN POTASSIUM 50 MG/1
100 TABLET ORAL DAILY
Status: DISCONTINUED | OUTPATIENT
Start: 2021-09-03 | End: 2021-09-03

## 2021-09-03 RX ORDER — HYDROCHLOROTHIAZIDE 25 MG/1
25 TABLET ORAL DAILY
Status: DISCONTINUED | OUTPATIENT
Start: 2021-09-04 | End: 2021-09-03 | Stop reason: HOSPADM

## 2021-09-03 RX ORDER — SODIUM CHLORIDE 9 MG/ML
25 INJECTION, SOLUTION INTRAVENOUS PRN
Status: DISCONTINUED | OUTPATIENT
Start: 2021-09-03 | End: 2021-09-03 | Stop reason: HOSPADM

## 2021-09-03 RX ORDER — LABETALOL HYDROCHLORIDE 5 MG/ML
20 INJECTION, SOLUTION INTRAVENOUS EVERY 4 HOURS
Status: DISCONTINUED | OUTPATIENT
Start: 2021-09-03 | End: 2021-09-03 | Stop reason: HOSPADM

## 2021-09-03 RX ORDER — ONDANSETRON 2 MG/ML
4 INJECTION INTRAMUSCULAR; INTRAVENOUS EVERY 6 HOURS PRN
Status: DISCONTINUED | OUTPATIENT
Start: 2021-09-03 | End: 2021-09-03 | Stop reason: HOSPADM

## 2021-09-03 RX ORDER — SODIUM CHLORIDE 0.9 % (FLUSH) 0.9 %
5-40 SYRINGE (ML) INJECTION PRN
Status: DISCONTINUED | OUTPATIENT
Start: 2021-09-03 | End: 2021-09-03 | Stop reason: HOSPADM

## 2021-09-03 RX ORDER — SODIUM CHLORIDE 0.9 % (FLUSH) 0.9 %
5-40 SYRINGE (ML) INJECTION EVERY 12 HOURS SCHEDULED
Status: DISCONTINUED | OUTPATIENT
Start: 2021-09-03 | End: 2021-09-03 | Stop reason: HOSPADM

## 2021-09-03 RX ORDER — LOSARTAN POTASSIUM 100 MG/1
100 TABLET ORAL DAILY
Qty: 30 TABLET | Refills: 0 | Status: SHIPPED | OUTPATIENT
Start: 2021-09-03 | End: 2021-09-30 | Stop reason: SDUPTHER

## 2021-09-03 RX ORDER — ACETAMINOPHEN 650 MG/1
650 SUPPOSITORY RECTAL EVERY 6 HOURS PRN
Status: DISCONTINUED | OUTPATIENT
Start: 2021-09-03 | End: 2021-09-03 | Stop reason: HOSPADM

## 2021-09-03 RX ORDER — LOSARTAN POTASSIUM AND HYDROCHLOROTHIAZIDE 12.5; 5 MG/1; MG/1
2 TABLET ORAL DAILY
Status: DISCONTINUED | OUTPATIENT
Start: 2021-09-03 | End: 2021-09-03 | Stop reason: CLARIF

## 2021-09-03 RX ORDER — ONDANSETRON 4 MG/1
4 TABLET, ORALLY DISINTEGRATING ORAL EVERY 8 HOURS PRN
Status: DISCONTINUED | OUTPATIENT
Start: 2021-09-03 | End: 2021-09-03 | Stop reason: HOSPADM

## 2021-09-03 RX ORDER — HYDROCHLOROTHIAZIDE 25 MG/1
25 TABLET ORAL DAILY
Qty: 30 TABLET | Refills: 0 | Status: SHIPPED | OUTPATIENT
Start: 2021-09-04 | End: 2021-09-14

## 2021-09-03 RX ORDER — ACETAMINOPHEN 325 MG/1
650 TABLET ORAL EVERY 6 HOURS PRN
Status: DISCONTINUED | OUTPATIENT
Start: 2021-09-03 | End: 2021-09-03 | Stop reason: HOSPADM

## 2021-09-03 RX ORDER — LOSARTAN POTASSIUM 50 MG/1
100 TABLET ORAL DAILY
Status: DISCONTINUED | OUTPATIENT
Start: 2021-09-04 | End: 2021-09-03 | Stop reason: HOSPADM

## 2021-09-03 RX ORDER — TRIAMCINOLONE ACETONIDE 0.25 MG/G
OINTMENT TOPICAL
Qty: 15 G | Refills: 1 | Status: SHIPPED | OUTPATIENT
Start: 2021-09-03 | End: 2021-09-10

## 2021-09-03 RX ADMIN — Medication 32 MILLICURIE: at 13:25

## 2021-09-03 RX ADMIN — Medication 12 MILLICURIE: at 10:42

## 2021-09-03 RX ADMIN — REGADENOSON 0.4 MG: 0.08 INJECTION, SOLUTION INTRAVENOUS at 13:22

## 2021-09-03 RX ADMIN — ACETAMINOPHEN 650 MG: 325 TABLET ORAL at 13:34

## 2021-09-03 RX ADMIN — LOSARTAN POTASSIUM 100 MG: 50 TABLET, FILM COATED ORAL at 09:41

## 2021-09-03 RX ADMIN — ONDANSETRON 4 MG: 2 INJECTION INTRAMUSCULAR; INTRAVENOUS at 13:31

## 2021-09-03 ASSESSMENT — PAIN SCALES - GENERAL
PAINLEVEL_OUTOF10: 3
PAINLEVEL_OUTOF10: 8

## 2021-09-03 ASSESSMENT — PAIN DESCRIPTION - PAIN TYPE: TYPE: ACUTE PAIN

## 2021-09-03 ASSESSMENT — PAIN DESCRIPTION - ORIENTATION: ORIENTATION: RIGHT

## 2021-09-03 ASSESSMENT — PAIN DESCRIPTION - LOCATION: LOCATION: ARM

## 2021-09-03 NOTE — PROGRESS NOTES
CLINICAL PHARMACY NOTE: MEDS TO BEDS    Total # of Prescriptions Filled: 3   The following medications were delivered to the patient:  · Aloe vesta ointment  · Triamcinolone acetonide 0.025 ointment   · Hydrochlorothiazide 25    Additional Documentation:

## 2021-09-03 NOTE — ED PROVIDER NOTES
HPI:  9/2/21,   Time: 8:08 PM EDT       Jihan Gómez is a 72 y.o. female presenting to the ED for htn, beginning unk ago. The complaint has been persistent, moderate in severity, and worsened by nothing. Seen at pcp, ekg with non spec t waves and htn, sent for htn urgency. No fever/chills/sweats. Chronic rt shoulder pain for 3 months since covid. Some  Sob and intermittent cp past week. Diarrhea past few days. Nothing makes better. Bib private vehicle    Review of Systems:   Pertinent positives and negatives are stated within HPI, all other systems reviewed and are negative.          --------------------------------------------- PAST HISTORY ---------------------------------------------  Past Medical History:  has a past medical history of HTN (hypertension). Past Surgical History:  has a past surgical history that includes Cholecystectomy (2012); partial hysterectomy (cervix not removed) (2001); Tonsillectomy; and Hemorrhoid surgery. Social History:  reports that she has been smoking cigarettes. She has a 2.50 pack-year smoking history. She has never used smokeless tobacco. She reports current alcohol use. She reports that she does not use drugs. Family History: family history includes COPD in her sister; Pearlene Proffer in her father; No Known Problems in her brother, brother, daughter, daughter, daughter, maternal grandfather, maternal grandmother, mother, paternal grandfather, paternal grandmother, sister, son, and son; Other in her sister. The patients home medications have been reviewed.     Allergies: Morphine        ---------------------------------------------------PHYSICAL EXAM--------------------------------------    Constitutional/General: Alert and oriented x3, well appearing, non toxic in NAD  Head: Normocephalic and atraumatic  Eyes: PERRL, EOMI, conjunctive normal, sclera non icteric  Mouth: Oropharynx clear, handling secretions, no trismus, no asymmetry of the posterior oropharynx or uvular edema  Neck: Supple, full ROM, non tender to palpation in the midline, no stridor, no crepitus, no meningeal signs  Respiratory: Lungs clear to auscultation bilaterally, no wheezes, rales, or rhonchi. Not in respiratory distress  Cardiovascular:  Regular rate. Regular rhythm. No murmurs, gallops, or rubs. 2+ distal pulses  Chest: No chest wall tenderness  GI:  Abdomen Soft, Non tender, Non distended. +BS. No organomegaly, no palpable masses,  No rebound, guarding, or rigidity. Musculoskeletal: Moves all extremities x 4. Warm and well perfused, no clubbing, cyanosis, or edema. Capillary refill <3 seconds  Integument: skin warm and dry. No rashes. Lymphatic: no lymphadenopathy noted  Neurologic: GCS 15, no focal deficits, symmetric strength 5/5 in the upper and lower extremities bilaterally  Psychiatric: Normal Affect    -------------------------------------------------- RESULTS -------------------------------------------------  I have personally reviewed all laboratory and imaging results for this patient. Results are listed below.      LABS:  Results for orders placed or performed during the hospital encounter of 09/02/21   CBC auto differential   Result Value Ref Range    WBC 5.3 4.5 - 11.5 E9/L    RBC 4.84 3.50 - 5.50 E12/L    Hemoglobin 15.4 11.5 - 15.5 g/dL    Hematocrit 44.1 34.0 - 48.0 %    MCV 91.1 80.0 - 99.9 fL    MCH 31.8 26.0 - 35.0 pg    MCHC 34.9 (H) 32.0 - 34.5 %    RDW 12.6 11.5 - 15.0 fL    Platelets 264 (L) 895 - 450 E9/L    MPV 10.9 7.0 - 12.0 fL    Neutrophils % 56.9 43.0 - 80.0 %    Immature Granulocytes % 0.2 0.0 - 5.0 %    Lymphocytes % 30.9 20.0 - 42.0 %    Monocytes % 9.7 2.0 - 12.0 %    Eosinophils % 1.9 0.0 - 6.0 %    Basophils % 0.4 0.0 - 2.0 %    Neutrophils Absolute 3.01 1.80 - 7.30 E9/L    Immature Granulocytes # 0.01 E9/L    Lymphocytes Absolute 1.63 1.50 - 4.00 E9/L    Monocytes Absolute 0.51 0.10 - 0.95 E9/L    Eosinophils Absolute 0.10 0.05 - 0.50 E9/L    Basophils Absolute 0.02 0.00 - 0.20 E9/L   Comprehensive Metabolic Panel   Result Value Ref Range    Sodium 140 132 - 146 mmol/L    Potassium 3.6 3.5 - 5.0 mmol/L    Chloride 105 98 - 107 mmol/L    CO2 26 22 - 29 mmol/L    Anion Gap 9 7 - 16 mmol/L    Glucose 87 74 - 99 mg/dL    BUN 15 6 - 23 mg/dL    CREATININE 0.9 0.5 - 1.0 mg/dL    GFR Non-African American >60 >=60 mL/min/1.73    GFR African American >60     Calcium 10.0 8.6 - 10.2 mg/dL    Total Protein 6.9 6.4 - 8.3 g/dL    Albumin 4.0 3.5 - 5.2 g/dL    Total Bilirubin 0.9 0.0 - 1.2 mg/dL    Alkaline Phosphatase 87 35 - 104 U/L    ALT 14 0 - 32 U/L    AST 19 0 - 31 U/L   Troponin   Result Value Ref Range    Troponin, High Sensitivity 7 0 - 9 ng/L   Brain Natriuretic Peptide   Result Value Ref Range    Pro- (H) 0 - 125 pg/mL   Magnesium   Result Value Ref Range    Magnesium 2.2 1.6 - 2.6 mg/dL   Lipid panel - fasting   Result Value Ref Range    Cholesterol, Total 121 0 - 199 mg/dL    Triglycerides 104 0 - 149 mg/dL    HDL 49 >40 mg/dL    LDL Calculated 51 0 - 99 mg/dL    VLDL Cholesterol Calculated 21 mg/dL   Hemoglobin A1c   Result Value Ref Range    Hemoglobin A1C 4.3 4.0 - 5.6 %   Troponin   Result Value Ref Range    Troponin, High Sensitivity 9 0 - 9 ng/L   EKG 12 Lead   Result Value Ref Range    Ventricular Rate 56 BPM    Atrial Rate 56 BPM    P-R Interval 146 ms    QRS Duration 86 ms    Q-T Interval 418 ms    QTc Calculation (Bazett) 403 ms    P Axis 55 degrees    R Axis -4 degrees    T Axis -18 degrees       RADIOLOGY:  Interpreted by Radiologist.  NM Cardiac Stress Test Nuclear Imaging   Final Result      1. The myocardial perfusion is normal.   2. No evidence of stress-induced ischemia or prior myocardial   infarction. 3. Normal LV systolic function, EF greater than 70%. 4. There is no transient ischemic dilation, TID ratio of 1.61 appears   falsely elevated. 5. Low risk myocardial perfusion study.          XR CHEST (2 VW)   Final Result   No acute 14 16 16 15   Temp: 99.1 °F (37.3 °C) 98.4 °F (36.9 °C) 97.8 °F (36.6 °C) 98.4 °F (36.9 °C)   TempSrc: Oral Oral Oral Oral   SpO2: 98% 97% 98%    Weight:  127 lb 3.2 oz (57.7 kg)     Height:  5' 2\" (1.575 m)       Card/Pulm:  Rhythm: normal rate. Heart Sounds: no murmurs, gallops, or rubs. clear to auscultation, no wheezes or rales and unlabored breathing. Capillary Refill: normal.  Radial Pulse:  equal.  Skin:  Warm. Consultations:             sound    Critical Care:         Counseling: The emergency provider has spoken with the patient and discussed todays results, in addition to providing specific details for the plan of care and counseling regarding the diagnosis and prognosis. Questions are answered at this time and they are agreeable with the plan.       --------------------------------- IMPRESSION AND DISPOSITION ---------------------------------    IMPRESSION  1. Hypertensive urgency    2. Abnormal EKG    3. Uncontrolled hypertension        DISPOSITION  Disposition: Admit to telemetry  Patient condition is stable    NOTE: This report was transcribed using voice recognition software.  Every effort was made to ensure accuracy; however, inadvertent computerized transcription errors may be present        Hany Mendiola MD  09/09/21 [id number]

## 2021-09-03 NOTE — PROCEDURES
Pharmacologic Nuclear Stress Test    Date: 9/3/2021    Indication: Chest pain    Description of procedure:    Protocol: Regadenoson stress SPECT myocardial perfusion imaging    Baseline EKG: NSR 63 bpm. Normal axis/intervals. Nonspecific ST changes. Baseline BP: 160/78 mmHg    0.4 mg of regadenoson was injected followed by radiotracer injection. Patient reported no chest pain. Stress EKG showed no evidence of ischemia, and no arrhythmias were noted. Impression:  1. No evidence of regadenoson induced ischemia on stress EKG. 2. Nuclear images to be reported separately.     Abe Mccarthy MD, [facility]

## 2021-09-03 NOTE — H&P
Hospitalist History & Physical      PCP: Jacqueline Leon DO    Date of Admission: 9/2/2021    Date of Service: Pt seen/examined on 9/2/2021 and is placed in observation. Chief Complaint:  had concerns including Abnormal Test Results (PCP sent in for NSTEMI due to Twave changes and hasn't been on her HTN meds and her BP is high) and Hypertension. History Of Present Illness:    Ms. [place], a 72y.o. year old female  who  has a past medical history of HTN (hypertension). Patient presented to the ED per the direction of her PCP for an NSTEMI due to T wave changes as well as hypertensive urgency given noncompliance with antihypertensives. She states that she originally went to see her PCP due to a lesion on her right index finger that has been present for approximately 1 year. She states she is been noncompliant with her medications because she has not had medical insurance in order to have them filled. She denies any chest pain, shortness of breath, palpitations, dizziness or syncope. ER COURSE:  On arrival to ED patients BP was 224/110. Vitals otherwise stable. Laboratory work-up significant for elevated BNP-619. EKG did reveal T wave inversion in the lateral and inferior leads that is new from 2019. Past Medical History:        Diagnosis Date    HTN (hypertension)        Past Surgical History:        Procedure Laterality Date    CHOLECYSTECTOMY  2012    HEMORRHOID SURGERY      PARTIAL HYSTERECTOMY  2001    TONSILLECTOMY      patient was 11years old       Medications Prior to Admission:      Prior to Admission medications    Medication Sig Start Date End Date Taking? Authorizing Provider   losartan (COZAAR) 100 MG tablet Take 1 tablet by mouth daily 8/24/21  Yes Jacqueline Leon DO       Allergies:  Morphine    Social History:    RESIDENCE: Private  TOBACCO:   reports that she has been smoking cigarettes. She has a 2.50 pack-year smoking history.  She has never used smokeless tobacco.  ETOH:   reports current alcohol use. Family History:          Problem Relation Age of Onset    No Known Problems Mother         mother was murdered, she doesn't know much history    Lung Cancer Father         and throat ca    Other Sister         afixiated on vomit    No Known Problems Brother     No Known Problems Maternal Grandmother     No Known Problems Maternal Grandfather     No Known Problems Paternal Grandmother     No Known Problems Paternal Grandfather     COPD Sister     No Known Problems Sister     No Known Problems Brother     No Known Problems Son     No Known Problems Son     No Known Problems Daughter     No Known Problems Daughter     No Known Problems Daughter        Review of Systems: All bolded are positive; please see HPI  General:  Fever, chills, diaphoresis, fatigue, malaise, night sweats, weight loss  Psychological:  Anxiety, disorientation, hallucinations. ENT:  Epistaxis, headaches, vertigo, visual changes. Cardiovascular:  Chest pain, irregular heartbeats, palpitations, paroxysmal nocturnal dyspnea. Respiratory:  Shortness of breath, coughing, sputum production, hemoptysis, wheezing, orthopnea. Gastrointestinal:  Nausea, vomiting, diarrhea, heartburn, constipation, abdominal pain, hematemesis, hematochezia, melena, acholic stools  Genito-Urinary:  Dysuria, urgency, frequency, hematuria  Musculoskeletal:  Joint pain, joint stiffness, joint swelling, muscle pain  Neurology:  Headache, focal neurological deficits, weakness, numbness, paresthesia  Derm:  Rashes, ulcers, excoriations, bruising  Extremities:  Decreased ROM, peripheral edema, mottling    PHYSICAL EXAM:  BP (!) 150/86   Pulse 50   Temp 98.4 °F (36.9 °C) (Oral)   Resp 16   Ht 5' 2\" (1.575 m)   Wt 127 lb 3.2 oz (57.7 kg)   SpO2 97%   BMI 23.27 kg/m²   General appearance: Middle aged female in no apparent distress, appears stated age and cooperative.   HEENT: Normal cephalic, atraumatic without urgency  EKG changes  Noncompliance  Tinea manus  Hepatitis C    PLAN:  Admit for observation  Resume home meds  Tele monitoring  PRN antihypertensives  Stress test  Check lipids & A1c  Lidex cream  Discharge later if stress low risk and BP under control    Diet: Diet NPO Exceptions are: Sips of Water with Meds  Code Status: Prior  Surrogate decision maker confirmed with patient:   Extended Emergency Contact Information  Primary Emergency Contact: Ernesto Gusman, [address] Phone: [phone]  Relation: Child    DVT Prophylaxis: [x]Lovenox []Heparin []PCD [] [address] []Encouraged ambulation  Disposition: []Med/Surg [x] Intermediate [] ICU/CCU  Admit status: [x] Observation [] Inpatient     +++++++++++++++++++++++++++++++++++++++++++++++++  Jorje Cabral , [place]  +++++++++++++++++++++++++++++++++++++++++++++++++  NOTE: This report was transcribed using voice recognition software. Every effort was made to ensure accuracy; however, inadvertent computerized transcription errors may be present.

## 2021-09-14 ENCOUNTER — TELEPHONE (OUTPATIENT)
Dept: FAMILY MEDICINE CLINIC | Age: 66
End: 2021-09-14

## 2021-09-14 ENCOUNTER — OFFICE VISIT (OUTPATIENT)
Dept: FAMILY MEDICINE CLINIC | Age: 66
End: 2021-09-14
Payer: COMMERCIAL

## 2021-09-14 VITALS
HEART RATE: 75 BPM | WEIGHT: 128.4 LBS | RESPIRATION RATE: 16 BRPM | TEMPERATURE: 96.6 F | HEIGHT: 62 IN | DIASTOLIC BLOOD PRESSURE: 86 MMHG | BODY MASS INDEX: 23.63 KG/M2 | OXYGEN SATURATION: 97 % | SYSTOLIC BLOOD PRESSURE: 148 MMHG

## 2021-09-14 DIAGNOSIS — R42 DIZZINESS: ICD-10-CM

## 2021-09-14 DIAGNOSIS — Z72.0 TOBACCO ABUSE: ICD-10-CM

## 2021-09-14 DIAGNOSIS — I16.0 HYPERTENSIVE URGENCY: Primary | ICD-10-CM

## 2021-09-14 DIAGNOSIS — B35.2 TINEA MANUS: ICD-10-CM

## 2021-09-14 PROCEDURE — 4004F PT TOBACCO SCREEN RCVD TLK: CPT | Performed by: FAMILY MEDICINE

## 2021-09-14 PROCEDURE — G8400 PT W/DXA NO RESULTS DOC: HCPCS | Performed by: FAMILY MEDICINE

## 2021-09-14 PROCEDURE — G8420 CALC BMI NORM PARAMETERS: HCPCS | Performed by: FAMILY MEDICINE

## 2021-09-14 PROCEDURE — 1123F ACP DISCUSS/DSCN MKR DOCD: CPT | Performed by: FAMILY MEDICINE

## 2021-09-14 PROCEDURE — 99214 OFFICE O/P EST MOD 30 MIN: CPT | Performed by: FAMILY MEDICINE

## 2021-09-14 PROCEDURE — 3017F COLORECTAL CA SCREEN DOC REV: CPT | Performed by: FAMILY MEDICINE

## 2021-09-14 PROCEDURE — 4040F PNEUMOC VAC/ADMIN/RCVD: CPT | Performed by: FAMILY MEDICINE

## 2021-09-14 PROCEDURE — 1111F DSCHRG MED/CURRENT MED MERGE: CPT | Performed by: FAMILY MEDICINE

## 2021-09-14 PROCEDURE — 1090F PRES/ABSN URINE INCON ASSESS: CPT | Performed by: FAMILY MEDICINE

## 2021-09-14 PROCEDURE — G8427 DOCREV CUR MEDS BY ELIG CLIN: HCPCS | Performed by: FAMILY MEDICINE

## 2021-09-14 RX ORDER — HYDROCHLOROTHIAZIDE 50 MG/1
50 TABLET ORAL DAILY
Qty: 30 TABLET | Refills: 2 | Status: SHIPPED
Start: 2021-09-14 | End: 2022-01-07 | Stop reason: SDUPTHER

## 2021-09-14 RX ORDER — NICOTINE 21 MG/24HR
1 PATCH, TRANSDERMAL 24 HOURS TRANSDERMAL DAILY
Qty: 42 PATCH | Refills: 0 | Status: SHIPPED
Start: 2021-09-14 | End: 2022-08-03 | Stop reason: SDUPTHER

## 2021-09-14 ASSESSMENT — ENCOUNTER SYMPTOMS
NAUSEA: 0
SHORTNESS OF BREATH: 0
DIARRHEA: 0
VOMITING: 0
CONSTIPATION: 0
COUGH: 0
RHINORRHEA: 0
WHEEZING: 0
BACK PAIN: 0
ABDOMINAL PAIN: 0
SINUS PRESSURE: 0
SORE THROAT: 0

## 2021-09-14 NOTE — PROGRESS NOTES
Post-Discharge Transitional Care Management Services or Hospital Follow Up      Gina Beck   YOB: 1955    Date of Office Visit:  9/14/2021  Date of Hospital Admission: 9/2/21  Date of Hospital Discharge: 9/3/21  Readmission Risk Score(high >=14%.  Medium >=10%):No data recorded    Care management risk score Rising risk (score 2-5) and Complex Care (Scores >=6): 0     Non face to face  following discharge, date last encounter closed (first attempt may have been earlier): *No documented post hospital discharge outreach found in the last 14 days *No documented post hospital discharge outreach found in the last 14 days    Call initiated 2 business days of discharge: *No response recorded in the last 14 days     Patient Active Problem List   Diagnosis    [facility]    Hypercalcemia    Acute kidney injury ([facility]    Essential hypertension    Moderate protein-calorie malnutrition ([facility]    Hypertensive urgency       Allergies   Allergen Reactions    Morphine      Patient states she had a panic attack with this       Medications listed as ordered at the time of discharge from [facility] Medication Instructions LUISA:    Printed on:09/14/21 9823   Medication Information                      hydroCHLOROthiazide (HYDRODIURIL) 50 MG tablet  Take 1 tablet by mouth daily             losartan (COZAAR) 100 MG tablet  Take 1 tablet by mouth daily             miconazole nitrate 2 % OINT  Apply topically 2 times daily             nicotine (NICODERM CQ) 14 MG/24HR  Place 1 patch onto the skin daily                   Medications marked \"taking\" at this time  Outpatient Medications Marked as Taking for the 9/14/21 encounter (Office Visit) with Scott Reynolds,    Medication Sig Dispense Refill    nicotine (NICODERM CQ) 14 MG/24HR Place 1 patch onto the skin daily 42 patch 0    miconazole nitrate 2 % OINT Apply topically 2 times daily 56 g 0    hydroCHLOROthiazide (HYDRODIURIL) 50 MG tablet Take 1 tablet by mouth daily 30 tablet 2    losartan (COZAAR) 100 MG tablet Take 1 tablet by mouth daily 30 tablet 0        Medications patient taking as of now reconciled against medications ordered at time of hospital discharge: Yes    Chief Complaint   Patient presents with   [address] from Hospital       HPI  Patient is presenting today for follow up from the hospital due to chest pain. She states that they did a stress test which was normal.  She was started on antihypertensive medications. She has been taking cozaar and HCTZ as prescribed. She has not had any chest pain or headaches since discharge. She has had dizziness early in the morning if she smokes a cigarette. She is interested in smoking cessation. She is currently smoking 4 cigarettes a day at the minimum. She states if she is anxious, she will smoke more. She has tried wellbutrin in the past and it made her feel strange. Inpatient course: Discharge summary reviewed- see chart. Interval history/Current status: as above    Review of Systems   Constitutional: Negative for chills, fatigue and fever. HENT: Negative for congestion, ear discharge, ear pain, postnasal drip, rhinorrhea, sinus pressure, sneezing and sore throat. Respiratory: Negative for cough, shortness of breath and wheezing. Cardiovascular: Negative for chest pain, palpitations and leg swelling. Gastrointestinal: Negative for abdominal pain, constipation, diarrhea, nausea and vomiting. Genitourinary: Negative for dysuria, frequency and hematuria. Musculoskeletal: Negative for arthralgias, back pain and myalgias. Skin: Positive for rash. Neurological: Positive for dizziness. Negative for light-headedness and headaches.        Vitals:    09/14/21 1434 09/14/21 1447   BP: (!) 180/110 (!) 148/86   Pulse: 75    Resp: 16    Temp: 96.6 °F (35.9 °C)    TempSrc: Temporal    SpO2: 97%    Weight: 128 lb 6.4 oz (58.2 kg)    Height: 5' 2\" (1.575 m)      Body mass index is 23.48 kg/m². Wt Readings from Last 3 Encounters:   09/14/21 128 lb 6.4 oz (58.2 kg)   09/03/21 127 lb 3.2 oz (57.7 kg)   09/02/21 128 lb (58.1 kg)     BP Readings from Last 3 Encounters:   09/14/21 (!) 148/86   09/03/21 104/66   09/02/21 (!) 172/94       Physical Exam  Vitals and nursing note reviewed. Constitutional:       Appearance: She is well-developed. HENT:      Head: Normocephalic and atraumatic. Right Ear: External ear normal.      Left Ear: External ear normal.      Nose: Nose normal.   Eyes:      Conjunctiva/sclera: Conjunctivae normal.      Pupils: Pupils are equal, round, and reactive to light. Neck:      Thyroid: No thyromegaly. Cardiovascular:      Rate and Rhythm: Normal rate and regular rhythm. Heart sounds: Normal heart sounds. Pulmonary:      Effort: Pulmonary effort is normal.      Breath sounds: Normal breath sounds. No wheezing. Abdominal:      General: Bowel sounds are normal.      Palpations: Abdomen is soft. Tenderness: There is no abdominal tenderness. Musculoskeletal:         General: Normal range of motion. Cervical back: Normal range of motion and neck supple. Skin:     General: Skin is warm and dry. Findings: Rash (erythematous rash right index finger) present. Neurological:      Mental Status: She is alert and oriented to person, place, and time. Deep Tendon Reflexes: Reflexes are normal and symmetric. Psychiatric:         Behavior: Behavior normal.             Assessment/Plan:  1. Hypertensive urgency  Blood pressure is not well controlled  Increase HCTZ to 50 mg daily  Side effects reviewed  Diet and exercise were discussed and recommended to the patient. Follow up in 1 week  - hydroCHLOROthiazide (HYDRODIURIL) 50 MG tablet; Take 1 tablet by mouth daily  Dispense: 30 tablet; Refill: 2  - [place] DISCHARGE MEDS RECONCILED W/ CURRENT OUTPATIENT MED LIST    2. Dizziness  Improved.    - [place] DISCHARGE MEDS RECONCILED W/ CURRENT OUTPATIENT MED LIST    3. Tinea manus  Will refill miconazole  Referral to derm ordered  - miconazole nitrate 2 % OINT; Apply topically 2 times daily  Dispense: 56 g; Refill: 0  - Leeroy Bajwa DO, [facility] ([facility])    4. Tobacco abuse  Patient advised that smoking is contributing to their illness. Cessation encouraged. Picking a quit date was encouraged. Will start nicotine patches  Side effects reviewed  - nicotine (NICODERM CQ) 14 MG/24HR; Place 1 patch onto the skin daily  Dispense: 42 patch;  Refill: 0        Medical Decision Making: high complexity

## 2021-09-14 NOTE — PATIENT INSTRUCTIONS
serving is 1 slice of bread, 1 ounce of dry cereal, or ½ cup of cooked rice, pasta, or cooked cereal. Try to choose whole-grain products as much as possible. · Limit lean meat, poultry, and fish to 2 servings each day. A serving is 3 ounces, about the size of a deck of cards. · Eat 4 to 5 servings of nuts, seeds, and legumes (cooked dried beans, lentils, and split peas) each week. A serving is 1/3 cup of nuts, 2 tablespoons of seeds, or ½ cup of cooked beans or peas. · Limit fats and oils to 2 to 3 servings each day. A serving is 1 teaspoon of vegetable oil or 2 tablespoons of salad dressing. · Limit sweets and added sugars to 5 servings or less a week. A serving is 1 tablespoon jelly or jam, ½ cup sorbet, or 1 cup of lemonade. · Eat less than 2,300 milligrams (mg) of sodium a day. If you limit your sodium to 1,500 mg a day, you can lower your blood pressure even more. · Be aware that all of these are the suggested number of servings for people who eat 1,800 to 2,000 calories a day. Your recommended number of servings may be different if you need more or fewer calories. Tips for success  · Start small. Do not try to make dramatic changes to your diet all at once. You might feel that you are missing out on your favorite foods and then be more likely to not follow the plan. Make small changes, and stick with them. Once those changes become habit, add a few more changes. · Try some of the following:  ? Make it a goal to eat a fruit or vegetable at every meal and at snacks. This will make it easy to get the recommended amount of fruits and vegetables each day. ? Try yogurt topped with fruit and nuts for a snack or healthy dessert. ? Add lettuce, tomato, cucumber, and onion to sandwiches. ? Combine a ready-made pizza crust with low-fat mozzarella cheese and lots of vegetable toppings. Try using tomatoes, squash, spinach, broccoli, carrots, cauliflower, and onions. ?  Have a variety of cut-up vegetables with a low-fat dip as an appetizer instead of chips and dip. ? Sprinkle sunflower seeds or chopped almonds over salads. Or try adding chopped walnuts or almonds to cooked vegetables. ? Try some vegetarian meals using beans and peas. Add garbanzo or kidney beans to salads. Make burritos and tacos with mashed sepulveda beans or black beans. Where can you learn more? Go to [email or web address]://Kenandy.[email or web address] org and sign in to your Kabongo account. Enter G308 in the [employer] box to learn more about \"DASH Diet: Care Instructions. \"     If you do not have an account, please click on the \"Sign Up Now\" link. Current as of: August 31, 2020               Content Version: 12.9  © 6047-3031 Healthwise, Incorporated. Care instructions adapted under license by [facility] ([facility]). If you have questions about a medical condition or this instruction, always ask your healthcare professional. [address] any warranty or liability for your use of this information.

## 2021-09-14 NOTE — TELEPHONE ENCOUNTER
----- Message from Raymond Winter sent at 9/14/2021  2:05 PM EDT -----  Subject: Message to Provider    QUESTIONS  Information for Provider? The patient had an appt today at 11:30 but she   thought it was at 2 and was wanting to talk to someone about that   requesting call back as soon as possible.  ---------------------------------------------------------------------------  --------------  [address]  What is the best way for the office to contact you? OK to leave message on   voicemail  Preferred Call Back Phone Number? [phone]  ---------------------------------------------------------------------------  --------------  SCRIPT ANSWERS  Relationship to Patient?  Self

## 2021-09-30 ENCOUNTER — TELEPHONE (OUTPATIENT)
Dept: FAMILY MEDICINE CLINIC | Age: 66
End: 2021-09-30

## 2021-09-30 DIAGNOSIS — I10 UNCONTROLLED HYPERTENSION: ICD-10-CM

## 2021-09-30 DIAGNOSIS — B35.2 TINEA MANUS: ICD-10-CM

## 2021-09-30 RX ORDER — LOSARTAN POTASSIUM 100 MG/1
100 TABLET ORAL DAILY
Qty: 30 TABLET | Refills: 0 | Status: SHIPPED
Start: 2021-09-30 | End: 2021-10-01

## 2021-10-01 DIAGNOSIS — I10 UNCONTROLLED HYPERTENSION: ICD-10-CM

## 2021-10-01 DIAGNOSIS — B35.2 TINEA MANUS: ICD-10-CM

## 2021-10-01 RX ORDER — MICONAZOLE NITRATE 2 G/100G
OINTMENT TOPICAL
Qty: 56 G | Refills: 0 | Status: SHIPPED
Start: 2021-10-01 | End: 2021-11-08

## 2021-10-01 RX ORDER — LOSARTAN POTASSIUM 100 MG/1
100 TABLET ORAL DAILY
Qty: 30 TABLET | Refills: 0 | Status: SHIPPED
Start: 2021-10-01 | End: 2021-12-13

## 2021-11-06 DIAGNOSIS — B35.2 TINEA MANUS: ICD-10-CM

## 2021-11-08 RX ORDER — MICONAZOLE NITRATE 2 G/100G
OINTMENT TOPICAL
Qty: 56 G | Refills: 0 | Status: SHIPPED | OUTPATIENT
Start: 2021-11-08

## 2021-12-10 DIAGNOSIS — I10 UNCONTROLLED HYPERTENSION: ICD-10-CM

## 2021-12-13 ENCOUNTER — HOSPITAL ENCOUNTER (OUTPATIENT)
Age: 66
Discharge: HOME | End: 2021-12-13
Payer: MEDICARE

## 2021-12-13 LAB
ALBUMIN SERPL-MCNC: 4.2 G/DL (ref 3.5–5.2)
ALP BLD-CCNC: 71 U/L (ref 35–104)
ALT SERPL-CCNC: 14 U/L (ref 0–32)
ANION GAP SERPL CALCULATED.3IONS-SCNC: 18 MMOL/L (ref 7–16)
AST SERPL-CCNC: 22 U/L (ref 0–31)
BASOPHILS ABSOLUTE: 0.02 E9/L (ref 0–0.2)
BASOPHILS RELATIVE PERCENT: 0.3 % (ref 0–2)
BILIRUB SERPL-MCNC: 1 MG/DL (ref 0–1.2)
BUN BLDV-MCNC: 20 MG/DL (ref 6–23)
C-REACTIVE PROTEIN, HIGH SENSITIVITY: <0.3 MG/L (ref 0–3)
CA 125: 7.4 U/ML (ref 0–35)
CALCIUM SERPL-MCNC: 10.3 MG/DL (ref 8.6–10.2)
CHLORIDE BLD-SCNC: 100 MMOL/L (ref 98–107)
CHOLESTEROL, TOTAL: 152 MG/DL (ref 0–199)
CO2: 22 MMOL/L (ref 22–29)
CREAT SERPL-MCNC: 1 MG/DL (ref 0.5–1)
EOSINOPHILS ABSOLUTE: 0.08 E9/L (ref 0.05–0.5)
EOSINOPHILS RELATIVE PERCENT: 1.4 % (ref 0–6)
GFR AFRICAN AMERICAN: >60
GFR NON-AFRICAN AMERICAN: >60 ML/MIN/1.73
GLUCOSE BLD-MCNC: 82 MG/DL (ref 74–99)
HBA1C MFR BLD: 4.5 % (ref 4–5.6)
HCT VFR BLD CALC: 44.1 % (ref 34–48)
HDLC SERPL-MCNC: 67 MG/DL
HEMOGLOBIN: 16 G/DL (ref 11.5–15.5)
IMMATURE GRANULOCYTES #: 0.02 E9/L
IMMATURE GRANULOCYTES %: 0.3 % (ref 0–5)
LDL CHOLESTEROL CALCULATED: 61 MG/DL (ref 0–99)
LYMPHOCYTES ABSOLUTE: 1.99 E9/L (ref 1.5–4)
LYMPHOCYTES RELATIVE PERCENT: 34.5 % (ref 20–42)
MCH RBC QN AUTO: 32.8 PG (ref 26–35)
MCHC RBC AUTO-ENTMCNC: 36.3 % (ref 32–34.5)
MCV RBC AUTO: 90.4 FL (ref 80–99.9)
MONOCYTES ABSOLUTE: 0.42 E9/L (ref 0.1–0.95)
MONOCYTES RELATIVE PERCENT: 7.3 % (ref 2–12)
NEUTROPHILS ABSOLUTE: 3.24 E9/L (ref 1.8–7.3)
NEUTROPHILS RELATIVE PERCENT: 56.2 % (ref 43–80)
PDW BLD-RTO: 13.1 FL (ref 11.5–15)
PLATELET # BLD: 121 E9/L (ref 130–450)
PMV BLD AUTO: 10.6 FL (ref 7–12)
POTASSIUM SERPL-SCNC: 2.6 MMOL/L (ref 3.5–5)
RBC # BLD: 4.88 E12/L (ref 3.5–5.5)
SODIUM BLD-SCNC: 140 MMOL/L (ref 132–146)
T3 FREE: 3.6 PG/ML (ref 2–4.4)
T4 FREE: 1.42 NG/DL (ref 0.93–1.7)
TOTAL PROTEIN: 7.7 G/DL (ref 6.4–8.3)
TRIGL SERPL-MCNC: 120 MG/DL (ref 0–149)
TSH SERPL DL<=0.05 MIU/L-ACNC: 1.41 UIU/ML (ref 0.27–4.2)
VITAMIN D 25-HYDROXY: 11 NG/ML (ref 30–100)
VLDLC SERPL CALC-MCNC: 24 MG/DL
WBC # BLD: 5.8 E9/L (ref 4.5–11.5)

## 2021-12-13 PROCEDURE — 80053 COMPREHEN METABOLIC PANEL: CPT

## 2021-12-13 PROCEDURE — 86141 C-REACTIVE PROTEIN HS: CPT

## 2021-12-13 PROCEDURE — 36415 COLL VENOUS BLD VENIPUNCTURE: CPT

## 2021-12-13 PROCEDURE — 83036 HEMOGLOBIN GLYCOSYLATED A1C: CPT

## 2021-12-13 PROCEDURE — 86304 IMMUNOASSAY TUMOR CA 125: CPT

## 2021-12-13 PROCEDURE — 84443 ASSAY THYROID STIM HORMONE: CPT

## 2021-12-13 PROCEDURE — 84481 FREE ASSAY (FT-3): CPT

## 2021-12-13 PROCEDURE — 80061 LIPID PANEL: CPT

## 2021-12-13 PROCEDURE — 85025 COMPLETE CBC W/AUTO DIFF WBC: CPT

## 2021-12-13 PROCEDURE — 82306 VITAMIN D 25 HYDROXY: CPT

## 2021-12-13 PROCEDURE — 84439 ASSAY OF FREE THYROXINE: CPT

## 2021-12-13 RX ORDER — LOSARTAN POTASSIUM 100 MG/1
100 TABLET ORAL DAILY
Qty: 30 TABLET | Refills: 0 | Status: SHIPPED
Start: 2021-12-13 | End: 2022-01-07 | Stop reason: SDUPTHER

## 2021-12-16 LAB
ALBUMIN SERPL-MCNC: 4.4 G/DL
ALP BLD-CCNC: 63 U/L
ALT SERPL-CCNC: 15 U/L
ANION GAP SERPL CALCULATED.3IONS-SCNC: NORMAL MMOL/L
AST SERPL-CCNC: 26 U/L
BILIRUB SERPL-MCNC: 1 MG/DL (ref 0.1–1.4)
BUN BLDV-MCNC: 18 MG/DL
CALCIUM SERPL-MCNC: 10.6 MG/DL
CHLORIDE BLD-SCNC: 103 MMOL/L
CO2: 28 MMOL/L
CREAT SERPL-MCNC: 1.12 MG/DL
GFR CALCULATED: 60
GLUCOSE BLD-MCNC: 104 MG/DL
POTASSIUM SERPL-SCNC: 3.6 MMOL/L
SODIUM BLD-SCNC: 143 MMOL/L
TOTAL PROTEIN: 7.6

## 2022-01-13 ENCOUNTER — TELEPHONE (OUTPATIENT)
Dept: FAMILY MEDICINE CLINIC | Age: 67
End: 2022-01-13

## 2022-01-13 ENCOUNTER — HOSPITAL ENCOUNTER (OUTPATIENT)
Age: 67
Discharge: HOME | End: 2022-01-15
Payer: MEDICARE

## 2022-01-13 ENCOUNTER — HOSPITAL ENCOUNTER (OUTPATIENT)
Dept: GENERAL RADIOLOGY | Age: 67
Discharge: HOME | End: 2022-01-15
Payer: MEDICARE

## 2022-01-13 ENCOUNTER — OFFICE VISIT (OUTPATIENT)
Dept: FAMILY MEDICINE CLINIC | Age: 67
End: 2022-01-13
Payer: MEDICARE

## 2022-01-13 VITALS
DIASTOLIC BLOOD PRESSURE: 86 MMHG | OXYGEN SATURATION: 94 % | HEIGHT: 62 IN | HEART RATE: 74 BPM | SYSTOLIC BLOOD PRESSURE: 140 MMHG | TEMPERATURE: 97.9 F | WEIGHT: 129 LBS | RESPIRATION RATE: 16 BRPM | BODY MASS INDEX: 23.74 KG/M2

## 2022-01-13 DIAGNOSIS — R07.81 RIB PAIN ON RIGHT SIDE: Primary | ICD-10-CM

## 2022-01-13 DIAGNOSIS — R07.81 RIB PAIN ON RIGHT SIDE: ICD-10-CM

## 2022-01-13 PROCEDURE — 71101 X-RAY EXAM UNILAT RIBS/CHEST: CPT

## 2022-01-13 PROCEDURE — 99213 OFFICE O/P EST LOW 20 MIN: CPT | Performed by: FAMILY MEDICINE

## 2022-01-13 NOTE — PROGRESS NOTES
Conjunctivae normal.      Pupils: Pupils are equal, round, and reactive to light. Neck:      Thyroid: No thyromegaly. Cardiovascular:      Rate and Rhythm: Normal rate and regular rhythm. Heart sounds: Normal heart sounds. Pulmonary:      Effort: Pulmonary effort is normal.      Breath sounds: Normal breath sounds. No wheezing. Chest:      Chest wall: Tenderness present. Abdominal:      General: Bowel sounds are normal.      Palpations: Abdomen is soft. Tenderness: There is no abdominal tenderness. Musculoskeletal:         General: Normal range of motion. Cervical back: Normal range of motion and neck supple. Skin:     General: Skin is warm and dry. Findings: No rash. Neurological:      Mental Status: She is alert and oriented to person, place, and time. Deep Tendon Reflexes: Reflexes are normal and symmetric. Psychiatric:         Behavior: Behavior normal.         Assessment/Plan:      Herb Piña was seen today for rib pain. Diagnoses and all orders for this visit:    Rib pain on right side  -     XR RIBS RIGHT INCLUDE CHEST (MIN 3 VIEWS); Future  RICE therapy  X-ray ordered. As above. Call or go to ED immediately if symptoms worsen or persist.  No follow-ups on file. , or sooner if necessary. Educational materials and/or home exercises printed for patient's review and were included in patient instructions on his/her After Visit Summary and given to patient at the end of visit. Counseled regarding above diagnosis, including possible risks and complications,  especially if left uncontrolled. Counseled regarding the possible side effects, risks, benefits and alternatives to treatment; patient and/or guardian verbalizes understanding, agrees, feels comfortable with and wishes to proceed with above treatment plan.     Advised patient to call with any new medication issues, and read all Rx info from pharmacy to assure aware of all possible risks and side effects of medication before taking. Reviewed age and gender appropriate health screening exams and vaccinations. Advised patient regarding importance of keeping up with recommended health maintenance and to schedule as soon as possible if overdue, as this is important in assessing for undiagnosed pathology, especially cancer, as well as protecting against potentially harmful/life threatening disease. Patient and/or guardian verbalizes understanding and agrees with above counseling, assessment and plan. All questions answered. Kevin Dumont DO  1/13/2022    I have personally reviewed and updated the chief complaint, HPI, Past Medical, Family and Social History, as well as the above Review of Systems.

## 2022-01-13 NOTE — PATIENT INSTRUCTIONS
Patient Education        Learning About the 0-to-10 Pain Scale  What is the 0-to-10 pain scale? Everyone feels pain differently. A pain scale is one way for a person to measure his or her pain so that the doctor can plan how best to manage it. The pain scale helps the doctor keep track of how well your treatment plan is working to reduce your pain and help you do daily tasks. Most pain scales use numbers from 0 to 10. A score of 0 means no pain, and 10 means the worst pain you have ever known. Your medical team will help you manage your pain in a variety of ways. Pain management may include changing your position, using ice or heat, or taking medicine. The pain scale  · 0 = No pain. · 1 = Pain is very mild, barely noticeable. Most of the time you don't think about it. · 2 = Minor pain. It's annoying. You may have sharp pain now and then. · 3 = Noticeable pain. It may distract you, but you can get used to it. · 4 = Moderate pain. If you are involved in an activity, you're able to ignore the pain for a while. But it is still distracting. · 5 = Moderately strong pain. You can't ignore it for more than a few minutes. But with effort you can still work or do some social activities. · 6 = Moderately stronger pain. You avoid some of your normal daily activities. You have trouble concentrating. · 7 = Strong pain. It keeps you from doing normal activities. · 8 = Very strong pain. It's hard to do anything at all. · 9 = Pain that is very hard to bear. You can't carry on a conversation. · 10 = Worst pain possible. Follow-up care is a key part of your treatment and safety. Be sure to make and go to all appointments, and call your doctor if you are having problems. It's also a good idea to know your test results and keep a list of the medicines you take. Where can you learn more? Go to https://karey.[email or web address] org and sign in to your VisualShare account.  Enter X109 in the [employer] box to learn more about \"Learning About the 0-to-10 Pain Scale. \"     If you do not have an account, please click on the \"Sign Up Now\" link. Current as of: April 8, 2021               Content Version: 13.1  © 2170-9705 Healthwise, Incorporated. Care instructions adapted under license by [facility] ([facility]). If you have questions about a medical condition or this instruction, always ask your healthcare professional. [address] any warranty or liability for your use of this information.

## 2022-01-13 NOTE — TELEPHONE ENCOUNTER
----- Message from Angelica Briones sent at 1/13/2022  2:58 PM EST -----  Subject: Message to Provider    QUESTIONS  Information for Provider? Patient had x-rays done today on her right side   in rib area at [facility]. Patient is wanting to   know if the results are ready. Please call her as soon as possible. Thank   you.  ---------------------------------------------------------------------------  --------------  CALL BACK INFO  What is the best way for the office to contact you? OK to leave message on   voicemail  Preferred Call Back Phone Number? [phone]  ---------------------------------------------------------------------------  --------------  SCRIPT ANSWERS  Relationship to Patient?  Self

## 2022-01-27 LAB
MEASLES IMMUNE (IGG): NORMAL
MUMPS AB IGG: NORMAL
RUBELLA ANTIBODY IGG: NORMAL
VARICELLA-ZOSTER VIRUS AB, IGG: NORMAL

## 2022-04-07 DIAGNOSIS — I16.0 HYPERTENSIVE URGENCY: ICD-10-CM

## 2022-04-07 RX ORDER — HYDROCHLOROTHIAZIDE 50 MG/1
50 TABLET ORAL DAILY
Qty: 30 TABLET | Refills: 2 | Status: ON HOLD
Start: 2022-04-07 | End: 2022-07-30 | Stop reason: SINTOL

## 2022-07-29 ENCOUNTER — TELEPHONE (OUTPATIENT)
Dept: FAMILY MEDICINE CLINIC | Age: 67
End: 2022-07-29

## 2022-07-29 ENCOUNTER — HOSPITAL ENCOUNTER (INPATIENT)
Age: 67
LOS: 1 days | Discharge: HOME | DRG: 305 | End: 2022-08-01
Attending: STUDENT IN AN ORGANIZED HEALTH CARE EDUCATION/TRAINING PROGRAM | Admitting: INTERNAL MEDICINE
Payer: MEDICARE

## 2022-07-29 ENCOUNTER — OFFICE VISIT (OUTPATIENT)
Dept: FAMILY MEDICINE CLINIC | Age: 67
End: 2022-07-29
Payer: MEDICARE

## 2022-07-29 ENCOUNTER — APPOINTMENT (OUTPATIENT)
Dept: GENERAL RADIOLOGY | Age: 67
DRG: 305 | End: 2022-07-29
Payer: MEDICARE

## 2022-07-29 ENCOUNTER — APPOINTMENT (OUTPATIENT)
Dept: CT IMAGING | Age: 67
DRG: 305 | End: 2022-07-29
Payer: MEDICARE

## 2022-07-29 VITALS
DIASTOLIC BLOOD PRESSURE: 112 MMHG | BODY MASS INDEX: 22.61 KG/M2 | SYSTOLIC BLOOD PRESSURE: 230 MMHG | RESPIRATION RATE: 16 BRPM | HEIGHT: 63 IN | WEIGHT: 127.6 LBS | TEMPERATURE: 97.6 F | OXYGEN SATURATION: 99 % | HEART RATE: 86 BPM

## 2022-07-29 DIAGNOSIS — I16.0 HYPERTENSIVE URGENCY: Primary | ICD-10-CM

## 2022-07-29 DIAGNOSIS — Z72.0 TOBACCO ABUSE: ICD-10-CM

## 2022-07-29 DIAGNOSIS — R94.31 ABNORMAL EKG: ICD-10-CM

## 2022-07-29 DIAGNOSIS — R11.0 NAUSEA: ICD-10-CM

## 2022-07-29 DIAGNOSIS — I10 UNCONTROLLED HYPERTENSION: ICD-10-CM

## 2022-07-29 DIAGNOSIS — R07.9 CHEST PAIN, UNSPECIFIED TYPE: ICD-10-CM

## 2022-07-29 DIAGNOSIS — I10 HYPERTENSION, UNSPECIFIED TYPE: Primary | ICD-10-CM

## 2022-07-29 LAB
ALBUMIN SERPL-MCNC: 4.8 G/DL (ref 3.5–5.2)
ALP BLD-CCNC: 85 U/L (ref 35–104)
ALT SERPL-CCNC: 21 U/L (ref 0–32)
ANION GAP SERPL CALCULATED.3IONS-SCNC: 16 MMOL/L (ref 7–16)
AST SERPL-CCNC: 34 U/L (ref 0–31)
BASOPHILS ABSOLUTE: 0.02 E9/L (ref 0–0.2)
BASOPHILS RELATIVE PERCENT: 0.4 % (ref 0–2)
BILIRUB SERPL-MCNC: 1.2 MG/DL (ref 0–1.2)
BUN BLDV-MCNC: 18 MG/DL (ref 6–23)
CALCIUM SERPL-MCNC: 10.5 MG/DL (ref 8.6–10.2)
CHLORIDE BLD-SCNC: 106 MMOL/L (ref 98–107)
CO2: 21 MMOL/L (ref 22–29)
CREAT SERPL-MCNC: 1 MG/DL (ref 0.5–1)
EOSINOPHILS ABSOLUTE: 0.05 E9/L (ref 0.05–0.5)
EOSINOPHILS RELATIVE PERCENT: 1 % (ref 0–6)
GFR AFRICAN AMERICAN: >60
GFR NON-AFRICAN AMERICAN: >60 ML/MIN/1.73
GLUCOSE BLD-MCNC: 73 MG/DL (ref 74–99)
HCT VFR BLD CALC: 49 % (ref 34–48)
HEMOGLOBIN: 17 G/DL (ref 11.5–15.5)
IMMATURE GRANULOCYTES #: 0.02 E9/L
IMMATURE GRANULOCYTES %: 0.4 % (ref 0–5)
LACTIC ACID: 1.1 MMOL/L (ref 0.5–2.2)
LIPASE: 19 U/L (ref 13–60)
LYMPHOCYTES ABSOLUTE: 1.14 E9/L (ref 1.5–4)
LYMPHOCYTES RELATIVE PERCENT: 23.5 % (ref 20–42)
MCH RBC QN AUTO: 33.1 PG (ref 26–35)
MCHC RBC AUTO-ENTMCNC: 34.7 % (ref 32–34.5)
MCV RBC AUTO: 95.3 FL (ref 80–99.9)
MONOCYTES ABSOLUTE: 0.44 E9/L (ref 0.1–0.95)
MONOCYTES RELATIVE PERCENT: 9.1 % (ref 2–12)
NEUTROPHILS ABSOLUTE: 3.18 E9/L (ref 1.8–7.3)
NEUTROPHILS RELATIVE PERCENT: 65.6 % (ref 43–80)
PDW BLD-RTO: 12.6 FL (ref 11.5–15)
PLATELET # BLD: 121 E9/L (ref 130–450)
PMV BLD AUTO: 9.8 FL (ref 7–12)
POTASSIUM REFLEX MAGNESIUM: 4.3 MMOL/L (ref 3.5–5)
PRO-BNP: 611 PG/ML (ref 0–125)
RBC # BLD: 5.14 E12/L (ref 3.5–5.5)
SODIUM BLD-SCNC: 143 MMOL/L (ref 132–146)
TOTAL PROTEIN: 8.2 G/DL (ref 6.4–8.3)
TROPONIN, HIGH SENSITIVITY: 11 NG/L (ref 0–9)
WBC # BLD: 4.9 E9/L (ref 4.5–11.5)

## 2022-07-29 PROCEDURE — 70450 CT HEAD/BRAIN W/O DYE: CPT

## 2022-07-29 PROCEDURE — 96374 THER/PROPH/DIAG INJ IV PUSH: CPT

## 2022-07-29 PROCEDURE — 93000 ELECTROCARDIOGRAM COMPLETE: CPT

## 2022-07-29 PROCEDURE — 83690 ASSAY OF LIPASE: CPT

## 2022-07-29 PROCEDURE — 99214 OFFICE O/P EST MOD 30 MIN: CPT

## 2022-07-29 PROCEDURE — 84484 ASSAY OF TROPONIN QUANT: CPT

## 2022-07-29 PROCEDURE — 6370000000 HC RX 637 (ALT 250 FOR IP): Performed by: STUDENT IN AN ORGANIZED HEALTH CARE EDUCATION/TRAINING PROGRAM

## 2022-07-29 PROCEDURE — 6360000002 HC RX W HCPCS: Performed by: STUDENT IN AN ORGANIZED HEALTH CARE EDUCATION/TRAINING PROGRAM

## 2022-07-29 PROCEDURE — 83880 ASSAY OF NATRIURETIC PEPTIDE: CPT

## 2022-07-29 PROCEDURE — 80053 COMPREHEN METABOLIC PANEL: CPT

## 2022-07-29 PROCEDURE — 96361 HYDRATE IV INFUSION ADD-ON: CPT

## 2022-07-29 PROCEDURE — 96375 TX/PRO/DX INJ NEW DRUG ADDON: CPT

## 2022-07-29 PROCEDURE — 85025 COMPLETE CBC W/AUTO DIFF WBC: CPT

## 2022-07-29 PROCEDURE — 83605 ASSAY OF LACTIC ACID: CPT

## 2022-07-29 PROCEDURE — 93005 ELECTROCARDIOGRAM TRACING: CPT | Performed by: PHYSICIAN ASSISTANT

## 2022-07-29 PROCEDURE — 71046 X-RAY EXAM CHEST 2 VIEWS: CPT

## 2022-07-29 PROCEDURE — 1123F ACP DISCUSS/DSCN MKR DOCD: CPT

## 2022-07-29 PROCEDURE — 2580000003 HC RX 258: Performed by: STUDENT IN AN ORGANIZED HEALTH CARE EDUCATION/TRAINING PROGRAM

## 2022-07-29 PROCEDURE — 36415 COLL VENOUS BLD VENIPUNCTURE: CPT

## 2022-07-29 PROCEDURE — 99285 EMERGENCY DEPT VISIT HI MDM: CPT

## 2022-07-29 RX ORDER — DIPHENHYDRAMINE HYDROCHLORIDE 50 MG/ML
25 INJECTION INTRAMUSCULAR; INTRAVENOUS ONCE
Status: COMPLETED | OUTPATIENT
Start: 2022-07-29 | End: 2022-07-29

## 2022-07-29 RX ORDER — ASPIRIN 81 MG/1
324 TABLET, CHEWABLE ORAL ONCE
Status: COMPLETED | OUTPATIENT
Start: 2022-07-29 | End: 2022-07-29

## 2022-07-29 RX ORDER — 0.9 % SODIUM CHLORIDE 0.9 %
1000 INTRAVENOUS SOLUTION INTRAVENOUS ONCE
Status: COMPLETED | OUTPATIENT
Start: 2022-07-29 | End: 2022-07-30

## 2022-07-29 RX ORDER — HYDRALAZINE HYDROCHLORIDE 20 MG/ML
10 INJECTION INTRAMUSCULAR; INTRAVENOUS ONCE
Status: COMPLETED | OUTPATIENT
Start: 2022-07-29 | End: 2022-07-29

## 2022-07-29 RX ORDER — METOCLOPRAMIDE HYDROCHLORIDE 5 MG/ML
10 INJECTION INTRAMUSCULAR; INTRAVENOUS ONCE
Status: COMPLETED | OUTPATIENT
Start: 2022-07-29 | End: 2022-07-29

## 2022-07-29 RX ADMIN — SODIUM CHLORIDE 1000 ML: 9 INJECTION, SOLUTION INTRAVENOUS at 21:58

## 2022-07-29 RX ADMIN — HYDRALAZINE HYDROCHLORIDE 10 MG: 20 INJECTION INTRAMUSCULAR; INTRAVENOUS at 21:30

## 2022-07-29 RX ADMIN — METOCLOPRAMIDE HYDROCHLORIDE 10 MG: 5 INJECTION INTRAMUSCULAR; INTRAVENOUS at 21:30

## 2022-07-29 RX ADMIN — DIPHENHYDRAMINE HYDROCHLORIDE 25 MG: 50 INJECTION, SOLUTION INTRAMUSCULAR; INTRAVENOUS at 21:29

## 2022-07-29 RX ADMIN — ASPIRIN 81 MG CHEWABLE TABLET 324 MG: 81 TABLET CHEWABLE at 21:29

## 2022-07-29 ASSESSMENT — PATIENT HEALTH QUESTIONNAIRE - PHQ9
SUM OF ALL RESPONSES TO PHQ9 QUESTIONS 1 & 2: 0
SUM OF ALL RESPONSES TO PHQ QUESTIONS 1-9: 0
SUM OF ALL RESPONSES TO PHQ QUESTIONS 1-9: 0
2. FEELING DOWN, DEPRESSED OR HOPELESS: 0
SUM OF ALL RESPONSES TO PHQ QUESTIONS 1-9: 0
1. LITTLE INTEREST OR PLEASURE IN DOING THINGS: 0
SUM OF ALL RESPONSES TO PHQ QUESTIONS 1-9: 0

## 2022-07-29 ASSESSMENT — PAIN - FUNCTIONAL ASSESSMENT: PAIN_FUNCTIONAL_ASSESSMENT: 0-10

## 2022-07-29 ASSESSMENT — PAIN DESCRIPTION - LOCATION: LOCATION: CHEST

## 2022-07-29 ASSESSMENT — PAIN SCALES - GENERAL: PAINLEVEL_OUTOF10: 4

## 2022-07-29 NOTE — PROGRESS NOTES
Chief Complaint       Diarrhea (Mucous in stool, nauseated, unable to eat, taking Immodium, balance is off has been going on for a couple months)    History of Present Illness   Source of history provided by:  patient. Rafael Bnetley is a 77 y.o. female who  has a past medical history of HTN (hypertension). presenting to the walk in clinic for evaluation of nausea and diarrhea intermittently x months. She states that today she has \"felt really off. \" She reports nausea and diaphoresis. She reports feeling dizzy and unsteady at times. Patient has tried taking Imodium at home without symptomatic relief. She denies any CP, SOB, blurry vision, HA, syncope, or collapse. She has been taking her BP meds as prescribed. ROS    Unless otherwise stated in this report or unable to obtain because of the patient's clinical or mental status as evidenced by the medical record, this patients's positive and negative responses for Review of Systems, constitutional, psych, eyes, ENT, cardiovascular, respiratory, gastrointestinal, neurological, genitourinary, musculoskeletal, integument systems and systems related to the presenting problem are either stated in the preceding or were not pertinent or were negative for the symptoms and/or complaints related to the medical problem. Physical Exam         VS:  BP (!) 230/112 (Site: Left Upper Arm, Position: Sitting, Cuff Size: Medium Adult)   Pulse 86   Temp 97.6 °F (36.4 °C) (Infrared)   Resp 16   Ht 5' 3\" (1.6 m)   Wt 127 lb 9.6 oz (57.9 kg)   SpO2 99%   BMI 22.60 kg/m²    Oxygen Saturation Interpretation: Normal.  Constitutional:  Alert, development consistent with age. Eyes:  PERRL, EOMI, no discharge or conjunctival injection. Ears:  External ears without lesions. TM's clear without erythema or perforation bilaterally. Throat:  Pharynx without injection, exudate, or tonsillar hypertrophy. Airway patient. Neck:  Normal ROM. Supple.   Lungs:  Clear to auscultation and breath sounds equal.  Heart:  Regular rate and rhythm, normal heart sounds, without pathological murmurs, ectopy, gallops, or rubs. Abdomen:  Soft, nontender, good bowel sounds. No firm or pulsatile mass. Back:  No costovertebral tenderness. Skin:  Normal turgor. Warm, dry, without visible rash, unless noted elsewhere. Neurological:  Alert and oriented. Motor functions intact. Lab / Imaging Results   (All laboratory and radiology results have been personally reviewed by myself)  Labs:  No results found for this visit on 07/29/22. Imaging: All Radiology results interpreted by Radiologist unless otherwise noted. EKG #1:  Interpreted by this provider unless otherwise noted. Time:  13:16    Rate: 66 bpm  Rhythm: Sinus rhythm   Interpretation: LVH, ST segment elevation in anterolateral leads  Comparison: Changed from 9/21. Assessment / Plan     Impression(s):  Marlon Beasley was seen today for diarrhea. Diagnoses and all orders for this visit:    Hypertension, unspecified type  -     EKG 12 lead; Future  -     EKG 12 lead    Abnormal EKG    Nausea    Disposition:  Disposition: Discharge to ED. Pt /112 on arrival. She is visibly uncomfortable and diaphoretic on exam. I was alerted by staff immediately of the BP readings. EKG performed which did show changes from previous EKG. Due to elevated BP and ongoing symptoms patient will need full cardiac work up including blood work, imagining, and monitoring that cannot be performed in the walk-in setting at this time. Patient refused EMS transport and daughter agreeable to transport her by private vehicle. Patient states understanding is in agreement with this care plan. All questions answered. CIARA Burks    **This report was transcribed using voice recognition software. Every effort was made to ensure accuracy; however, inadvertent computerized transcription errors may be present.

## 2022-07-29 NOTE — TELEPHONE ENCOUNTER
Clement  Pt called in and has been having diarrhea for weeks, losing weight,lack of appetite; I told her to go to [facility] and cindy is closet for her so she is going there today; I explained we will do a followup and scheduled her ; also, I let her know that they will give her a work note if she is late for work or cannot work today; she was pleased with our conversation

## 2022-07-30 ENCOUNTER — APPOINTMENT (OUTPATIENT)
Dept: NUCLEAR MEDICINE | Age: 67
DRG: 305 | End: 2022-07-30
Payer: MEDICARE

## 2022-07-30 PROBLEM — R07.9 CHEST PAIN IN ADULT: Status: ACTIVE | Noted: 2022-07-30

## 2022-07-30 LAB — TROPONIN, HIGH SENSITIVITY: 11 NG/L (ref 0–9)

## 2022-07-30 PROCEDURE — 6360000002 HC RX W HCPCS

## 2022-07-30 PROCEDURE — 2500000003 HC RX 250 WO HCPCS: Performed by: NURSE PRACTITIONER

## 2022-07-30 PROCEDURE — 6370000000 HC RX 637 (ALT 250 FOR IP): Performed by: FAMILY MEDICINE

## 2022-07-30 PROCEDURE — G0378 HOSPITAL OBSERVATION PER HR: HCPCS

## 2022-07-30 PROCEDURE — 6360000002 HC RX W HCPCS: Performed by: FAMILY MEDICINE

## 2022-07-30 PROCEDURE — 2580000003 HC RX 258: Performed by: FAMILY MEDICINE

## 2022-07-30 PROCEDURE — 96376 TX/PRO/DX INJ SAME DRUG ADON: CPT

## 2022-07-30 PROCEDURE — 6370000000 HC RX 637 (ALT 250 FOR IP): Performed by: NURSE PRACTITIONER

## 2022-07-30 PROCEDURE — 96375 TX/PRO/DX INJ NEW DRUG ADDON: CPT

## 2022-07-30 RX ORDER — ACETAMINOPHEN 650 MG/1
650 SUPPOSITORY RECTAL EVERY 6 HOURS PRN
Status: DISCONTINUED | OUTPATIENT
Start: 2022-07-30 | End: 2022-07-31

## 2022-07-30 RX ORDER — ENOXAPARIN SODIUM 100 MG/ML
40 INJECTION SUBCUTANEOUS DAILY
Status: DISCONTINUED | OUTPATIENT
Start: 2022-07-30 | End: 2022-08-01 | Stop reason: HOSPADM

## 2022-07-30 RX ORDER — PROMETHAZINE HYDROCHLORIDE 12.5 MG/1
12.5 TABLET ORAL EVERY 6 HOURS PRN
Status: DISCONTINUED | OUTPATIENT
Start: 2022-07-30 | End: 2022-08-01 | Stop reason: HOSPADM

## 2022-07-30 RX ORDER — ONDANSETRON 2 MG/ML
4 INJECTION INTRAMUSCULAR; INTRAVENOUS EVERY 6 HOURS PRN
Status: DISCONTINUED | OUTPATIENT
Start: 2022-07-30 | End: 2022-08-01 | Stop reason: HOSPADM

## 2022-07-30 RX ORDER — ACETAMINOPHEN 325 MG/1
650 TABLET ORAL EVERY 6 HOURS PRN
Status: DISCONTINUED | OUTPATIENT
Start: 2022-07-30 | End: 2022-07-31

## 2022-07-30 RX ORDER — HYDRALAZINE HYDROCHLORIDE 20 MG/ML
INJECTION INTRAMUSCULAR; INTRAVENOUS
Status: COMPLETED
Start: 2022-07-30 | End: 2022-07-30

## 2022-07-30 RX ORDER — HYDRALAZINE HYDROCHLORIDE 20 MG/ML
10 INJECTION INTRAMUSCULAR; INTRAVENOUS EVERY 4 HOURS PRN
Status: DISCONTINUED | OUTPATIENT
Start: 2022-07-30 | End: 2022-08-01 | Stop reason: HOSPADM

## 2022-07-30 RX ORDER — BUTALBITAL, ACETAMINOPHEN AND CAFFEINE 50; 325; 40 MG/1; MG/1; MG/1
2 TABLET ORAL ONCE
Status: COMPLETED | OUTPATIENT
Start: 2022-07-30 | End: 2022-07-30

## 2022-07-30 RX ORDER — ASPIRIN 81 MG/1
324 TABLET, CHEWABLE ORAL ONCE
Status: DISCONTINUED | OUTPATIENT
Start: 2022-07-30 | End: 2022-07-30

## 2022-07-30 RX ORDER — ASPIRIN 81 MG/1
81 TABLET, CHEWABLE ORAL DAILY
Status: DISCONTINUED | OUTPATIENT
Start: 2022-07-30 | End: 2022-08-01 | Stop reason: HOSPADM

## 2022-07-30 RX ORDER — HYDRALAZINE HYDROCHLORIDE 20 MG/ML
10 INJECTION INTRAMUSCULAR; INTRAVENOUS ONCE
Status: COMPLETED | OUTPATIENT
Start: 2022-07-30 | End: 2022-07-30

## 2022-07-30 RX ORDER — HYDROCHLOROTHIAZIDE 25 MG/1
50 TABLET ORAL DAILY
Status: DISCONTINUED | OUTPATIENT
Start: 2022-07-30 | End: 2022-08-01 | Stop reason: HOSPADM

## 2022-07-30 RX ORDER — SODIUM CHLORIDE 0.9 % (FLUSH) 0.9 %
10 SYRINGE (ML) INJECTION EVERY 12 HOURS SCHEDULED
Status: DISCONTINUED | OUTPATIENT
Start: 2022-07-30 | End: 2022-08-01 | Stop reason: HOSPADM

## 2022-07-30 RX ORDER — SODIUM CHLORIDE 9 MG/ML
INJECTION, SOLUTION INTRAVENOUS PRN
Status: DISCONTINUED | OUTPATIENT
Start: 2022-07-30 | End: 2022-08-01 | Stop reason: HOSPADM

## 2022-07-30 RX ORDER — POLYETHYLENE GLYCOL 3350 17 G/17G
17 POWDER, FOR SOLUTION ORAL DAILY PRN
Status: DISCONTINUED | OUTPATIENT
Start: 2022-07-30 | End: 2022-08-01 | Stop reason: HOSPADM

## 2022-07-30 RX ORDER — LOSARTAN POTASSIUM 50 MG/1
100 TABLET ORAL DAILY
Status: DISCONTINUED | OUTPATIENT
Start: 2022-07-30 | End: 2022-08-01 | Stop reason: HOSPADM

## 2022-07-30 RX ORDER — AMLODIPINE BESYLATE 5 MG/1
5 TABLET ORAL DAILY
Status: DISCONTINUED | OUTPATIENT
Start: 2022-07-30 | End: 2022-07-31

## 2022-07-30 RX ORDER — LABETALOL HYDROCHLORIDE 5 MG/ML
10 INJECTION, SOLUTION INTRAVENOUS
Status: DISCONTINUED | OUTPATIENT
Start: 2022-07-30 | End: 2022-08-01 | Stop reason: HOSPADM

## 2022-07-30 RX ORDER — NICOTINE 21 MG/24HR
1 PATCH, TRANSDERMAL 24 HOURS TRANSDERMAL DAILY
Status: DISCONTINUED | OUTPATIENT
Start: 2022-07-30 | End: 2022-08-01 | Stop reason: HOSPADM

## 2022-07-30 RX ORDER — SODIUM CHLORIDE 0.9 % (FLUSH) 0.9 %
10 SYRINGE (ML) INJECTION PRN
Status: DISCONTINUED | OUTPATIENT
Start: 2022-07-30 | End: 2022-08-01 | Stop reason: HOSPADM

## 2022-07-30 RX ADMIN — LABETALOL HYDROCHLORIDE 10 MG: 5 INJECTION, SOLUTION INTRAVENOUS at 18:25

## 2022-07-30 RX ADMIN — HYDRALAZINE HYDROCHLORIDE 10 MG: 20 INJECTION INTRAMUSCULAR; INTRAVENOUS at 02:57

## 2022-07-30 RX ADMIN — LABETALOL HYDROCHLORIDE 10 MG: 5 INJECTION, SOLUTION INTRAVENOUS at 22:18

## 2022-07-30 RX ADMIN — BUTALBITAL, ACETAMINOPHEN, AND CAFFEINE 2 TABLET: 50; 325; 40 TABLET ORAL at 21:57

## 2022-07-30 RX ADMIN — LOSARTAN POTASSIUM 100 MG: 50 TABLET, FILM COATED ORAL at 11:47

## 2022-07-30 RX ADMIN — ASPIRIN 81 MG CHEWABLE TABLET 81 MG: 81 TABLET CHEWABLE at 08:29

## 2022-07-30 RX ADMIN — LABETALOL HYDROCHLORIDE 10 MG: 5 INJECTION, SOLUTION INTRAVENOUS at 23:26

## 2022-07-30 RX ADMIN — SODIUM CHLORIDE, PRESERVATIVE FREE 10 ML: 5 INJECTION INTRAVENOUS at 17:20

## 2022-07-30 RX ADMIN — Medication 10 ML: at 11:47

## 2022-07-30 RX ADMIN — LABETALOL HYDROCHLORIDE 10 MG: 5 INJECTION, SOLUTION INTRAVENOUS at 21:17

## 2022-07-30 RX ADMIN — HYDRALAZINE HYDROCHLORIDE 10 MG: 20 INJECTION INTRAMUSCULAR; INTRAVENOUS at 15:49

## 2022-07-30 RX ADMIN — LABETALOL HYDROCHLORIDE 10 MG: 5 INJECTION, SOLUTION INTRAVENOUS at 17:20

## 2022-07-30 RX ADMIN — HYDRALAZINE HYDROCHLORIDE 10 MG: 20 INJECTION INTRAMUSCULAR; INTRAVENOUS at 11:47

## 2022-07-30 RX ADMIN — SODIUM CHLORIDE, PRESERVATIVE FREE 10 ML: 5 INJECTION INTRAVENOUS at 15:50

## 2022-07-30 RX ADMIN — AMLODIPINE BESYLATE 5 MG: 5 TABLET ORAL at 13:10

## 2022-07-30 RX ADMIN — HYDROCHLOROTHIAZIDE 50 MG: 25 TABLET ORAL at 08:29

## 2022-07-30 RX ADMIN — SODIUM CHLORIDE, PRESERVATIVE FREE 10 ML: 5 INJECTION INTRAVENOUS at 18:25

## 2022-07-30 RX ADMIN — Medication 10 ML: at 21:17

## 2022-07-30 RX ADMIN — ACETAMINOPHEN 650 MG: 325 TABLET, FILM COATED ORAL at 13:10

## 2022-07-30 ASSESSMENT — PAIN DESCRIPTION - LOCATION
LOCATION: HEAD

## 2022-07-30 ASSESSMENT — PAIN DESCRIPTION - FREQUENCY: FREQUENCY: CONTINUOUS

## 2022-07-30 ASSESSMENT — PAIN - FUNCTIONAL ASSESSMENT
PAIN_FUNCTIONAL_ASSESSMENT: PREVENTS OR INTERFERES SOME ACTIVE ACTIVITIES AND ADLS
PAIN_FUNCTIONAL_ASSESSMENT: PREVENTS OR INTERFERES SOME ACTIVE ACTIVITIES AND ADLS

## 2022-07-30 ASSESSMENT — PAIN DESCRIPTION - ONSET: ONSET: ON-GOING

## 2022-07-30 ASSESSMENT — PAIN SCALES - GENERAL
PAINLEVEL_OUTOF10: 6
PAINLEVEL_OUTOF10: 4
PAINLEVEL_OUTOF10: 10
PAINLEVEL_OUTOF10: 10
PAINLEVEL_OUTOF10: 6

## 2022-07-30 ASSESSMENT — PAIN DESCRIPTION - DESCRIPTORS
DESCRIPTORS: ACHING
DESCRIPTORS: ACHING;DISCOMFORT;SORE

## 2022-07-30 ASSESSMENT — PAIN DESCRIPTION - ORIENTATION
ORIENTATION: MID
ORIENTATION: MID

## 2022-07-30 ASSESSMENT — PAIN DESCRIPTION - PAIN TYPE: TYPE: ACUTE PAIN

## 2022-07-30 NOTE — H&P
Hospitalist History & Physical      PCP: Taylor Locke DO    Date of Service: Pt seen/examined on 7/30/2022    Chief Complaint:  had concerns including Chest Pain (Constant L sided CP since this morning, pain worsens w/ exertion, SOB, radiates to back). History Of Present Illness:    Ms. Yolanda Urbina, a 77y.o. year old female  who  has a past medical history of HTN (hypertension). Presented to the emergency department with complaints of chest pain. The pain is left-sided began this morning. Worse with exertion. Radiates into her back. Also some shortness of breath. Patient's vital signs show the patient to be hypertensive with initial pressure 244/120. After treatment is currently 184/80. The remainder of the vital signs are within normal limits and stable. Laboratory studies were unremarkable. There were some nonspecific ST changes on the EKG. Troponin was 11 with repeat of 11. The patient be admitted for further cardiac evaluation. Past Medical History:   Diagnosis Date    HTN (hypertension)        Past Surgical History:   Procedure Laterality Date    CHOLECYSTECTOMY  2012    HEMORRHOID SURGERY      PARTIAL HYSTERECTOMY (CERVIX NOT REMOVED)  2001    TONSILLECTOMY      patient was 11years old       Prior to Admission medications    Medication Sig Start Date End Date Taking? Authorizing Provider   hydroCHLOROthiazide (HYDRODIURIL) 50 MG tablet TAKE 1 TABLET BY MOUTH DAILY 4/7/22   Taylor Locke,    losartan (COZAAR) 100 MG tablet TAKE 1 TABLET BY MOUTH DAILY 2/7/22   Taylor Locke DO   ALOE VESTA CLEAR ANTIFUNGAL 2 % OINT APPLY TOPICALLY TO THE AFFECTED AREA TWICE DAILY 11/8/21   Taylor Locke DO   nicotine (NICODERM CQ) 14 MG/24HR Place 1 patch onto the skin daily 9/14/21 7/29/22  Taylor Locke DO         Allergies:  Morphine    Social History:    TOBACCO:   reports that she has been smoking cigarettes. She has a 1.25 pack-year smoking history.  She has never used smokeless tobacco.  ETOH:   reports current alcohol use. Family History:    Reviewed in detail and negative for DM, CAD, Cancer, CVA. Positive as follows\"      Problem Relation Age of Onset    No Known Problems Mother         mother was murdered, she doesn't know much history    Lung Cancer Father         and throat ca    Other Sister         afixiated on vomit    No Known Problems Brother     No Known Problems Maternal Grandmother     No Known Problems Maternal Grandfather     No Known Problems Paternal Grandmother     No Known Problems Paternal Grandfather     COPD Sister     No Known Problems Sister     No Known Problems Brother     No Known Problems Son     No Known Problems Son     No Known Problems Daughter     No Known Problems Daughter     No Known Problems Daughter        REVIEW OF SYSTEMS:   Pertinent positives as noted in the HPI. All other systems reviewed and negative. PHYSICAL EXAM:  BP (!) 184/80   Pulse 71   Temp 97.5 °F (36.4 °C)   Resp 28   SpO2 98%   General appearance: No apparent distress, appears stated age and cooperative. HEENT: Normal cephalic, atraumatic without obvious deformity. Pupils equal, round, and reactive to light. Extra ocular muscles intact. Conjunctivae/corneas clear. Neck: Supple, with full range of motion. No jugular venous distention. Trachea midline. Respiratory: CTA  Cardiovascular: RRR  Abdomen: S/NT/ND  Musculoskeletal: No clubbing, cyanosis, edema of bilateral lower extremities. Brisk capillary refill. Skin: Normal skin color. No rashes or lesions. Neurologic:  Neurovascularly intact without any focal sensory/motor deficits.  Cranial nerves: II-XII intact, grossly non-focal.  Psychiatric: Alert and oriented, thought content appropriate, normal insight    Reviewed EKG and CXR personally      CBC:   Recent Labs     07/29/22  1435   WBC 4.9   RBC 5.14   HGB 17.0*   HCT 49.0*   MCV 95.3   RDW 12.6   *     BMP:   Recent Labs     07/29/22  1435    K 4.3      CO2 21*   BUN 18   CREATININE 1.0     LFT:  Recent Labs     07/29/22  1435   PROT 8.2   ALKPHOS 85   ALT 21   AST 34*   BILITOT 1.2   LIPASE 19     CE:  No results for input(s): Elio Gann in the last 72 hours. PT/INR: No results for input(s): INR, APTT in the last 72 hours. BNP: No results for input(s): BNP in the last 72 hours. ESR: No results found for: SEDRATE  CRP: No results found for: CRP  D Dimer: No results found for: DDIMER   Folate and B12: No results found for: [id number], No results found for: FOLATE  Lactic Acid:   Lab Results   Component Value Date    LACTA 1.1 07/29/2022     Thyroid Studies:   Lab Results   Component Value Date    TSH 1.410 12/13/2021       Oupatient labs:  Lab Results   Component Value Date    CHOL 152 12/13/2021    TRIG 120 12/13/2021    HDL 67 12/13/2021    LDLCALC 61 12/13/2021    TSH 1.410 12/13/2021    INR 1.0 09/11/2019    LABA1C 4.5 12/13/2021       Urinalysis:    Lab Results   Component Value Date/Time    NITRU Negative 07/15/2020 01:32 AM    WBCUA 1-3 07/15/2020 01:32 AM    BACTERIA RARE 07/15/2020 01:32 AM    RBCUA NONE 07/15/2020 01:32 AM    BLOODU Negative 07/15/2020 01:32 AM    SPECGRAV 1.010 07/15/2020 01:32 AM    GLUCOSEU Negative 07/15/2020 01:32 AM       Imaging:  XR CHEST (2 VW)    Result Date: 7/29/2022  EXAMINATION: TWO XRAY VIEWS OF THE CHEST 7/29/2022 3:37 pm COMPARISON: None. HISTORY: ORDERING SYSTEM PROVIDED HISTORY: chest pain TECHNOLOGIST PROVIDED HISTORY: Reason for exam:->chest pain What reading provider will be dictating this exam?->CRC FINDINGS: The lungs are without acute focal process. There is no effusion or pneumothorax. The cardiomediastinal silhouette is without acute process. The osseous structures are without acute process. No acute process.      CT HEAD WO CONTRAST    Result Date: 7/29/2022  EXAMINATION: CT OF THE HEAD WITHOUT CONTRAST  7/29/2022 4:53 pm TECHNIQUE: CT of the head was performed without the administration of intravenous contrast. Automated exposure control, iterative reconstruction, and/or weight based adjustment of the mA/kV was utilized to reduce the radiation dose to as low as reasonably achievable. COMPARISON: None. HISTORY: ORDERING SYSTEM PROVIDED HISTORY: headache- HTN TECHNOLOGIST PROVIDED HISTORY: Reason for exam:->headache- HTN Has a \"code stroke\" or \"stroke alert\" been called? ->No Decision Support Exception - unselect if not a suspected or confirmed emergency medical condition->Emergency Medical Condition (MA) What reading provider will be dictating this exam?->CRC FINDINGS: BRAIN/VENTRICLES: There is no acute intracranial hemorrhage, mass effect or midline shift. No abnormal extra-axial fluid collection. The gray-white differentiation is maintained without evidence of an acute infarct. There is no evidence of hydrocephalus. ORBITS: The visualized portion of the orbits demonstrate no acute abnormality. SINUSES: The visualized paranasal sinuses and mastoid air cells demonstrate no acute abnormality. SOFT TISSUES/SKULL:  No acute abnormality of the visualized skull or soft tissues. No acute intracranial abnormality.        ASSESSMENT:  -Chest pain  -Poorly controlled hypertension  -Hyperlipidemia      PLAN:  -Admit to medicine  -Nuclear stress with exercise  -Cycle serial troponins  -Check lipid panel  -Hydralazine 10mg IV q4h prn SBP >160   -Aspirin, atorvastatin, Lovenox        Diet: No diet orders on file  Code Status: Prior  Surrogate decision maker confirmed with patient:   Extended Emergency Contact Information  Primary Emergency Contact: John Kennedy, [address] [place] [address] Phone: [phone]  Relation: Child    DVT Prophylaxis: []Lovenox []Heparin []PCD [] [address] []Encouraged ambulation  Disposition: []Med/Surg [] Intermediate [] ICU/CCU  Admit status: [] Observation [] Inpatient +++++++++++++++++++++++++++++++++++++++++++++++++  Nacho Raymundo DO  +++++++++++++++++++++++++++++++++++++++++++++++++  NOTE: This report was transcribed using voice recognition software. Every effort was made to ensure accuracy; however, inadvertent computerized transcription errors may be present.

## 2022-07-30 NOTE — PROGRESS NOTES
Secure message to NP [facility] re continued elevated /100 manual w/ c/o 6/10 headache. Received PRN tylenol at 1310 for headache 4/10, reduced to 2/10, then got progressively worse. New orders received.

## 2022-07-30 NOTE — PROGRESS NOTES
Spoke with Susan on [address] regarding follow up from attending regarding stress testing. She is waiting for response and clarification also.

## 2022-07-30 NOTE — ED PROVIDER NOTES
Department of Emergency Medicine   ED  Provider Note  Admit Date/RoomTime: 7/29/2022  7:15 PM  ED Room: 08/08          History of Present Illness:  7/30/22, Time: 3:43 AM EDT  Chief Complaint   Patient presents with    Chest Pain     Constant L sided CP since this morning, pain worsens w/ exertion, SOB, radiates to back        Leopold Bailiff is a 77 y.o. female presenting to the ED for chest pain, beginning earlier today. The complaint has been persistent, mild in severity, and worsened by nothing. Patient is a 59-year-old female with a history of hypertension on losartan  and HCTZ who presents the emergency department complaining of chest pain. Patient states her symptoms are sudden onset earlier today, mild, persistent, nothing makes it better or worse. She had left-sided chest pain that started this morning. She states that she does notice it more when exerting herself. She states the pain does radiate into her back and describes it as sharp. She also complains of shortness of breath. She does also complain of a headache. Denies any fever, chills, lightheadedness, dizziness, syncope, blurry vision, double vision, numbness, tingling, unilateral weakness, history of prior similar episodes in the past, missing any doses of her medications, recent change in medications, recent hospitalization, recent was, or other acute symptoms or concerns. She does not have a cardiac history. She states that she has not had a stress test.  She actually went to urgent care earlier today and they sent her here for an abnormal EKG and elevated BP. Review of Systems:   A complete review of systems was performed and pertinent positives and negatives are stated within HPI, all other systems reviewed and are negative.        --------------------------------------------- PAST HISTORY ---------------------------------------------  Past Medical History:  has a past medical history of HTN (hypertension).     Past Surgical History:  has a past surgical history that includes Cholecystectomy (2012); Partial hysterectomy (2001); Tonsillectomy; and Hemorrhoid surgery. Social History:  reports that she has been smoking cigarettes. She has a 1.25 pack-year smoking history. She has never used smokeless tobacco. She reports current alcohol use. She reports that she does not use drugs. Family History: family history includes COPD in her sister; Elizabeth Games in her father; No Known Problems in her brother, brother, daughter, daughter, daughter, maternal grandfather, maternal grandmother, mother, paternal grandfather, paternal grandmother, sister, son, and son; Other in her sister. . Unless otherwise noted, family history is non contributory    The patients home medications have been reviewed. Allergies: Morphine    I have reviewed the past medical history, past surgical history, social history, and family history    ---------------------------------------------------PHYSICAL EXAM--------------------------------------    Constitutional/General: Alert and oriented x3  Head: Normocephalic and atraumatic  Eyes:  EOMI, sclera non icteric  ENT: Oropharynx clear, handling secretions, no trismus, no asymmetry of the posterior oropharynx or uvular edema  Neck: Supple, full ROM, no stridor, no meningeal signs  Respiratory: Lungs clear to auscultation bilaterally, no wheezes, rales, or rhonchi. Not in respiratory distress  Cardiovascular:  Regular rate. Regular rhythm. No murmurs, no gallops, no rubs. 2+ distal pulses. Equal extremity pulses. Gastrointestinal:  Abdomen Soft, Non tender, Non distended. No rebound, guarding, or rigidity. No pulsatile masses. Musculoskeletal: Moves all extremities x 4. Warm and well perfused, no clubbing, no cyanosis, no edema. Capillary refill <3 seconds  Skin: skin warm and dry. No rashes.    Neurologic: GCS 15, no focal deficits, symmetric strength 5/5 in the upper and lower extremities bilaterally  Psychiatric: Normal Affect    -------------------------------------------------- RESULTS -------------------------------------------------  I have personally reviewed all laboratory and imaging results for this patient. Results are listed below.      LABS: (Lab results interpreted by me)  Results for orders placed or performed during the hospital encounter of 07/29/22   CBC with Auto Differential   Result Value Ref Range    WBC 4.9 4.5 - 11.5 E9/L    RBC 5.14 3.50 - 5.50 E12/L    Hemoglobin 17.0 (H) 11.5 - 15.5 g/dL    Hematocrit 49.0 (H) 34.0 - 48.0 %    MCV 95.3 80.0 - 99.9 fL    MCH 33.1 26.0 - 35.0 pg    MCHC 34.7 (H) 32.0 - 34.5 %    RDW 12.6 11.5 - 15.0 fL    Platelets 925 (L) 842 - 450 E9/L    MPV 9.8 7.0 - 12.0 fL    Neutrophils % 65.6 43.0 - 80.0 %    Immature Granulocytes % 0.4 0.0 - 5.0 %    Lymphocytes % 23.5 20.0 - 42.0 %    Monocytes % 9.1 2.0 - 12.0 %    Eosinophils % 1.0 0.0 - 6.0 %    Basophils % 0.4 0.0 - 2.0 %    Neutrophils Absolute 3.18 1.80 - 7.30 E9/L    Immature Granulocytes # 0.02 E9/L    Lymphocytes Absolute 1.14 (L) 1.50 - 4.00 E9/L    Monocytes Absolute 0.44 0.10 - 0.95 E9/L    Eosinophils Absolute 0.05 0.05 - 0.50 E9/L    Basophils Absolute 0.02 0.00 - 0.20 E9/L   Comprehensive Metabolic Panel w/ Reflex to MG   Result Value Ref Range    Sodium 143 132 - 146 mmol/L    Potassium reflex Magnesium 4.3 3.5 - 5.0 mmol/L    Chloride 106 98 - 107 mmol/L    CO2 21 (L) 22 - 29 mmol/L    Anion Gap 16 7 - 16 mmol/L    Glucose 73 (L) 74 - 99 mg/dL    BUN 18 6 - 23 mg/dL    Creatinine 1.0 0.5 - 1.0 mg/dL    GFR Non-African American >60 >=60 mL/min/1.73    GFR African American >60     Calcium 10.5 (H) 8.6 - 10.2 mg/dL    Total Protein 8.2 6.4 - 8.3 g/dL    Albumin 4.8 3.5 - 5.2 g/dL    Total Bilirubin 1.2 0.0 - 1.2 mg/dL    Alkaline Phosphatase 85 35 - 104 U/L    ALT 21 0 - 32 U/L    AST 34 (H) 0 - 31 U/L   Troponin   Result Value Ref Range    Troponin, High Sensitivity 11 (H) 0 - 9 ng/L   Lipase   Result Value Ref Range    Lipase 19 13 - 60 U/L   Lactic Acid   Result Value Ref Range    Lactic Acid 1.1 0.5 - 2.2 mmol/L   Brain Natriuretic Peptide   Result Value Ref Range    Pro- (H) 0 - 125 pg/mL   Troponin   Result Value Ref Range    Troponin, High Sensitivity 11 (H) 0 - 9 ng/L   EKG 12 Lead   Result Value Ref Range    Ventricular Rate 65 BPM    Atrial Rate 65 BPM    P-R Interval 144 ms    QRS Duration 82 ms    Q-T Interval 438 ms    QTc Calculation (Bazett) 455 ms    P Axis 65 degrees    R Axis 57 degrees    T Axis -3 degrees   ,       RADIOLOGY:  Interpreted by Radiologist unless otherwise specified  CT HEAD WO CONTRAST   Final Result   No acute intracranial abnormality. XR CHEST (2 VW)   Final Result   No acute process. NM Cardiac Stress Test Nuclear Imaging    (Results Pending)         EKG Interpretation  Interpreted by emergency department physician, Dr. Kole Daniels   EKG: This EKG is signed and interpreted by me. Rate: 65  Rhythm: Sinus  Interpretation: Normal sinus rhythm, normal axis, nonspecific changes in the anterior septal leads, intervals within normal limits, QTC is 455  Comparison: stable as compared to patient's most recent EKG       ------------------------- NURSING NOTES AND VITALS REVIEWED ---------------------------   The nursing notes within the ED encounter and vital signs as below have been reviewed by myself  BP (!) 161/67   Pulse 87   Temp 97.5 °F (36.4 °C) (Oral)   Resp 22   Ht 5' 3\" (1.6 m)   Wt 127 lb (57.6 kg)   SpO2 97%   BMI 22.50 kg/m²     Oxygen Saturation Interpretation: Normal    The patients available past medical records and past encounters were reviewed.         ------------------------------ ED COURSE/MEDICAL DECISION MAKING----------------------  Medications   hydroCHLOROthiazide (HYDRODIURIL) tablet 50 mg (has no administration in time range)   nicotine (NICODERM CQ) 14 MG/24HR 1 patch (has no administration in time range)   losartan (COZAAR) tablet 100 mg (has no administration in time range)   sodium chloride flush 0.9 % injection 10 mL (has no administration in time range)   sodium chloride flush 0.9 % injection 10 mL (has no administration in time range)   0.9 % sodium chloride infusion (has no administration in time range)   enoxaparin (LOVENOX) injection 40 mg (has no administration in time range)   promethazine (PHENERGAN) tablet 12.5 mg (has no administration in time range)     Or   ondansetron (ZOFRAN) injection 4 mg (has no administration in time range)   polyethylene glycol (GLYCOLAX) packet 17 g (has no administration in time range)   acetaminophen (TYLENOL) tablet 650 mg (has no administration in time range)     Or   acetaminophen (TYLENOL) suppository 650 mg (has no administration in time range)   hydrALAZINE (APRESOLINE) injection 10 mg (has no administration in time range)   aspirin chewable tablet 81 mg (has no administration in time range)   hydrALAZINE (APRESOLINE) injection 10 mg (10 mg IntraVENous Given 7/29/22 2130)   aspirin chewable tablet 324 mg (324 mg Oral Given 7/29/22 2129)   metoclopramide (REGLAN) injection 10 mg (10 mg IntraVENous Given 7/29/22 2130)   diphenhydrAMINE (BENADRYL) injection 25 mg (25 mg IntraVENous Given 7/29/22 2129)   0.9 % sodium chloride bolus (0 mLs IntraVENous Stopped 7/30/22 0022)   hydrALAZINE (APRESOLINE) injection 10 mg (10 mg IntraVENous Given 7/30/22 0257)           The cardiac monitor revealed NSR with a heart rate in the 80s as interpreted by me. The cardiac monitor was ordered secondary to the patient's chest pain and to monitor the patient for dysrhythmia. CPT [id number]       I, Dr. Yakelin De Oliveira, am the primary provider of record    Medical Decision Making:   The patient is a 60-year-old female presents the emergency department complaining of chest pain. She was extremely hypertensive on arrival with a blood pressure near 240/120. She was complaining of left-sided chest pain and a headache.   She was treated with hydralazine along with a headache cocktail and aspirin. EKG did show some nonspecific changes in the anterior septal leads. Troponins were negative at 11 and 11 respectively. CT head did not show acute normalities. Chest x-ray was negative. Patient's blood pressure did improve into the 170s upon admission. However, with the EKG changes and significantly elevated blood pressures, I suspect they are related but she will be admitted for observation overnight and evaluation by cardiology. Patient agreed with plan. Oxygen Saturation Interpretation: 97 % on room air. Re-Evaluations:  ED Course as of 07/30/22 0347   Sat Jul 30, 2022   0142 Consulted with Dr. Criss Byrd, hospitalist, who accepted for admission. [KG]      ED Course User Index  [KG] Christy Randle, DO       Resting comfortably on reassessment. BP improved. This patient's ED course included: a personal history and physicial examination, re-evaluation prior to disposition, multiple bedside re-evaluations, IV medications, cardiac monitoring, continuous pulse oximetry, and complex medical decision making and emergency management    This patient has remained hemodynamically stable during their ED course. Consultations:  Spoke with Dr. Criss Byrd (Medicine). Discussed case. They will admit this patient. Counseling: The emergency provider has spoken with the patient and discussed todays results, in addition to providing specific details for the plan of care and counseling regarding the diagnosis and prognosis. Questions are answered at this time and they are agreeable with the plan.       --------------------------------- IMPRESSION AND DISPOSITION ---------------------------------    IMPRESSION  1. Hypertensive urgency    2. Chest pain, unspecified type    3.  Abnormal EKG        DISPOSITION  Disposition: Admit to telemetry  Patient condition is stable        NOTE: This report was transcribed using voice recognition software.  Every effort was made to ensure accuracy; however, inadvertent computerized transcription errors may be present       Nicolasa Scott DO  07/30/22 [id number]

## 2022-07-30 NOTE — PROGRESS NOTES
Hospitalist Progress Note      Chart reviewed. Non-billable rounding. She was admitted overnight. She was seen sitting up in bed in no distress. States that she went to an urgent care because she was \"not feeling well. \"  She was referred to the ER for hypertension. On arrival to the ER her blood pressure was 244/120. She endorsed some left-sided chest pain as well as shortness of breath although she does deny these complaints presently. EKG in the ED revealed nonspecific ST changes. Troponin was 11, repeat 11. She was admitted with order for exercise stress test.    On review of her chart she had a pharm stress in September 2021 which was low risk and estimated an EF of >70%. Her blood pressures have been labile overnight however she denied any complaints of chest pain when her pressure was controlled with systolics <814 mmHg. Home hydrochlorothiazide and losartan were resumed. Patient does state however that she discontinued her hydrochlorothiazide due to persistent hypokalemia. Potassium is normal, will repeat a BMP tomorrow. In the interim we will continue hydrochlorothiazide, losartan and add amlodipine 5 mg p.o. daily. PRN hydralazine ordered. No current need for repeat stress test.  We will continue to monitor blood pressure and plan for discharge when it is controlled with systolics consistently <163 mmHg. We discussed this plan and she is agreeable. She is aware that she needs to follow-up with her PCP after discharge for further blood pressure management. Also noted that she has hypercalcemia. Previously saw nephrology in 2019 and diagnosed with primary hyperparathyroidism. Her calcium levels did normalize at that time with IV fluid administration alone. Parathyroid scan at that time was negative for an adenoma.   Will obtain ionized calcium and hold on IV fluids for now in the setting of hypertensive urgency.    +++++++++++++++++++++++++++++++++++++++++++++++++  [address], [place]

## 2022-07-30 NOTE — PROGRESS NOTES
Attempted to reach Dr. Katie Antoine via [employer] (which stated he is taking his calls at this time.) I spoke with the cardiologist doing stress tests today and was asked to relay information to Dr. Katie Antoine prior to proceeding with any testing. Awaiting response. ER nurse Vicenta Nielsen updated and stated the patient received a room on [facility]. Rolando Voss RN on [facility] also updated.

## 2022-07-31 LAB
ANION GAP SERPL CALCULATED.3IONS-SCNC: 18 MMOL/L (ref 7–16)
BETA-HYDROXYBUTYRATE: 0.82 MMOL/L (ref 0.02–0.27)
BUN BLDV-MCNC: 17 MG/DL (ref 6–23)
CALCIUM IONIZED: 1.41 MMOL/L (ref 1.15–1.33)
CALCIUM SERPL-MCNC: 10.8 MG/DL (ref 8.6–10.2)
CHLORIDE BLD-SCNC: 104 MMOL/L (ref 98–107)
CO2: 14 MMOL/L (ref 22–29)
CREAT SERPL-MCNC: 1 MG/DL (ref 0.5–1)
EKG ATRIAL RATE: 65 BPM
EKG P AXIS: 65 DEGREES
EKG P-R INTERVAL: 144 MS
EKG Q-T INTERVAL: 438 MS
EKG QRS DURATION: 82 MS
EKG QTC CALCULATION (BAZETT): 455 MS
EKG R AXIS: 57 DEGREES
EKG T AXIS: -3 DEGREES
EKG VENTRICULAR RATE: 65 BPM
GFR AFRICAN AMERICAN: >60
GFR NON-AFRICAN AMERICAN: >60 ML/MIN/1.73
GLUCOSE BLD-MCNC: 99 MG/DL (ref 74–99)
PHOSPHORUS: 3.1 MG/DL (ref 2.5–4.5)
POTASSIUM REFLEX MAGNESIUM: 3.7 MMOL/L (ref 3.5–5)
SODIUM BLD-SCNC: 136 MMOL/L (ref 132–146)

## 2022-07-31 PROCEDURE — 2500000003 HC RX 250 WO HCPCS: Performed by: NURSE PRACTITIONER

## 2022-07-31 PROCEDURE — 96376 TX/PRO/DX INJ SAME DRUG ADON: CPT

## 2022-07-31 PROCEDURE — 36415 COLL VENOUS BLD VENIPUNCTURE: CPT

## 2022-07-31 PROCEDURE — 82010 KETONE BODYS QUAN: CPT

## 2022-07-31 PROCEDURE — 82330 ASSAY OF CALCIUM: CPT

## 2022-07-31 PROCEDURE — 2580000003 HC RX 258: Performed by: FAMILY MEDICINE

## 2022-07-31 PROCEDURE — 2140000000 HC CCU INTERMEDIATE R&B

## 2022-07-31 PROCEDURE — 6370000000 HC RX 637 (ALT 250 FOR IP): Performed by: FAMILY MEDICINE

## 2022-07-31 PROCEDURE — 6370000000 HC RX 637 (ALT 250 FOR IP): Performed by: NURSE PRACTITIONER

## 2022-07-31 PROCEDURE — 80048 BASIC METABOLIC PNL TOTAL CA: CPT

## 2022-07-31 PROCEDURE — 84100 ASSAY OF PHOSPHORUS: CPT

## 2022-07-31 RX ORDER — POTASSIUM CHLORIDE 20 MEQ/1
20 TABLET, EXTENDED RELEASE ORAL
Status: DISCONTINUED | OUTPATIENT
Start: 2022-07-31 | End: 2022-08-01 | Stop reason: HOSPADM

## 2022-07-31 RX ORDER — POTASSIUM CHLORIDE 20 MEQ/1
20 TABLET, EXTENDED RELEASE ORAL
Status: DISCONTINUED | OUTPATIENT
Start: 2022-08-01 | End: 2022-07-31

## 2022-07-31 RX ORDER — ACETAMINOPHEN 500 MG
1000 TABLET ORAL EVERY 6 HOURS PRN
Status: DISCONTINUED | OUTPATIENT
Start: 2022-07-31 | End: 2022-08-01 | Stop reason: HOSPADM

## 2022-07-31 RX ORDER — AMLODIPINE BESYLATE 10 MG/1
10 TABLET ORAL DAILY
Status: DISCONTINUED | OUTPATIENT
Start: 2022-08-01 | End: 2022-08-01 | Stop reason: HOSPADM

## 2022-07-31 RX ORDER — AMLODIPINE BESYLATE 5 MG/1
5 TABLET ORAL ONCE
Status: COMPLETED | OUTPATIENT
Start: 2022-07-31 | End: 2022-07-31

## 2022-07-31 RX ORDER — CLONIDINE HYDROCHLORIDE 0.1 MG/1
0.1 TABLET ORAL 3 TIMES DAILY
Status: DISCONTINUED | OUTPATIENT
Start: 2022-07-31 | End: 2022-07-31

## 2022-07-31 RX ADMIN — POTASSIUM CHLORIDE 20 MEQ: 1500 TABLET, EXTENDED RELEASE ORAL at 12:32

## 2022-07-31 RX ADMIN — AMLODIPINE BESYLATE 5 MG: 5 TABLET ORAL at 09:17

## 2022-07-31 RX ADMIN — LABETALOL HYDROCHLORIDE 10 MG: 5 INJECTION, SOLUTION INTRAVENOUS at 05:59

## 2022-07-31 RX ADMIN — HYDROCHLOROTHIAZIDE 50 MG: 25 TABLET ORAL at 09:17

## 2022-07-31 RX ADMIN — LABETALOL HYDROCHLORIDE 10 MG: 5 INJECTION, SOLUTION INTRAVENOUS at 15:51

## 2022-07-31 RX ADMIN — Medication 10 ML: at 09:17

## 2022-07-31 RX ADMIN — LOSARTAN POTASSIUM 100 MG: 50 TABLET, FILM COATED ORAL at 09:17

## 2022-07-31 RX ADMIN — ASPIRIN 81 MG CHEWABLE TABLET 81 MG: 81 TABLET CHEWABLE at 09:17

## 2022-07-31 RX ADMIN — LABETALOL HYDROCHLORIDE 10 MG: 5 INJECTION, SOLUTION INTRAVENOUS at 04:39

## 2022-07-31 RX ADMIN — CLONIDINE HYDROCHLORIDE 0.1 MG: 0.1 TABLET ORAL at 09:17

## 2022-07-31 RX ADMIN — LABETALOL HYDROCHLORIDE 10 MG: 5 INJECTION, SOLUTION INTRAVENOUS at 00:34

## 2022-07-31 RX ADMIN — SODIUM CHLORIDE, PRESERVATIVE FREE 10 ML: 5 INJECTION INTRAVENOUS at 15:51

## 2022-07-31 RX ADMIN — AMLODIPINE BESYLATE 5 MG: 5 TABLET ORAL at 13:29

## 2022-07-31 RX ADMIN — Medication 10 ML: at 23:42

## 2022-07-31 NOTE — PROGRESS NOTES
28 [facility] Dr. Aliza Gutierrez due to patient's continuing headache and high BP. Patient states her headache is 10/10 and the tylenol she received earlier did not help. Patient's BP is also still 190/75. Awaiting response or orders to be placed.

## 2022-07-31 NOTE — PROGRESS NOTES
Hospitalist Progress Note      SYNOPSIS:     Ms. Humberto Reid is a 77year old female with a PMH of HTN , hep C and primary hyperparathyroidism who presented to the ED on  after being referred from an urgent care for HTN. She reported that she went to the urgent care because she was \"not feeling well. \" On arrival to the ED she was noted to have a BP of 244/120. She endorsed some left-sided chest pain as well as shortness of breath. EKG in the ED revealed nonspecific ST changes. Troponin was 11, repeat 11. CXR negative. She was admitted with order for exercise stress test.      On review of her chart she had a pharm stress in 2021 which was low risk and estimated an EF of >70%. Her blood pressures were labile overnight and she denied any complaints of chest pain when her pressure was controlled with systolics <294 mmHg. Home hydrochlorothiazide and losartan were resumed. She admitted to discontinuing the HCTZ at home due to persistent hypokalemia. SUBJECTIVE:    Patient seen and examined  Records reviewed. She complained of a headache last evening and was given fiorcet. BP remains elevated      Temp (24hrs), Av.8 °F (37.1 °C), Min:98.2 °F (36.8 °C), Max:99.3 °F (37.4 °C)    DIET: ADULT DIET; Regular; Low Sodium (2 gm)  CODE: Full Code    Intake/Output Summary (Last 24 hours) at 2022 0950  Last data filed at 2022 2212  Gross per 24 hour   Intake 180 ml   Output 1 ml   Net 179 ml       Review of Systems  All bolded are positive; please see HPI  General:  Fever, chills, diaphoresis, fatigue, malaise, night sweats, weight loss  Psychological:  Anxiety, disorientation, hallucinations. ENT:  Epistaxis, headaches, vertigo, visual changes. Cardiovascular:  Chest pain, irregular heartbeats, palpitations, paroxysmal nocturnal dyspnea. Respiratory:  Shortness of breath, coughing, sputum production, hemoptysis, wheezing, orthopnea.   Gastrointestinal:  Nausea, vomiting, diarrhea, heartburn, constipation, abdominal pain, hematemesis, hematochezia, melena, acholic stools  Genito-Urinary:  Dysuria, urgency, frequency, hematuria  Musculoskeletal:  Joint pain, joint stiffness, joint swelling, muscle pain  Neurology:  Headache, focal neurological deficits, weakness, numbness, paresthesia  Derm:  Rashes, ulcers, excoriations, bruising  Extremities:  Decreased ROM, peripheral edema, mottling      OBJECTIVE:    BP (!) 160/80   Pulse 70   Temp 98.2 °F (36.8 °C) (Oral)   Resp 16   Ht 5' 3\" (1.6 m)   Wt 127 lb (57.6 kg)   SpO2 100%   BMI 22.50 kg/m²     General appearance:  awake, alert, and oriented to person, place, time, and purpose; appears stated age and cooperative; no apparent distress no labored breathing  HEENT:  Conjunctivae/corneas clear. Neck: Supple. No jugular venous distention. Respiratory: symmetrical; clear to auscultation bilaterally; no wheezes; no rhonchi; no rales  Cardiovascular: rhythm regular; rate controlled; no murmurs  Abdomen: Soft, nontender, nondistended  Extremities:  peripheral pulses present; no peripheral edema; no ulcers  Musculoskeletal: No clubbing, cyanosis, no bilateral lower extremity edema. Brisk capillary refill.    Skin:  No rashes  on visible skin  Neurologic: awake, alert and following commands     Medications:  REVIEWED DAILY    Infusion Medications    sodium chloride       Scheduled Medications    cloNIDine  0.1 mg Oral TID    [START ON 8/1/2022] potassium chloride  20 mEq Oral Daily with breakfast    hydroCHLOROthiazide  50 mg Oral Daily    nicotine  1 patch TransDERmal Daily    losartan  100 mg Oral Daily    sodium chloride flush  10 mL IntraVENous 2 times per day    enoxaparin  40 mg SubCUTAneous Daily    aspirin  81 mg Oral Daily    amLODIPine  5 mg Oral Daily     PRN Meds: sodium chloride flush, sodium chloride, promethazine **OR** ondansetron, polyethylene glycol, acetaminophen **OR** acetaminophen, hydrALAZINE, labetalol    Labs: Recent Labs     07/29/22  1435   WBC 4.9   HGB 17.0*   HCT 49.0*   *       Recent Labs     07/29/22  1435 07/31/22  0535    136   K 4.3 3.7    104   CO2 21* 14*   BUN 18 17   CREATININE 1.0 1.0   CALCIUM 10.5* 10.8*   PHOS  --  3.1       Recent Labs     07/29/22  1435   PROT 8.2   ALKPHOS 85   ALT 21   AST 34*   BILITOT 1.2   LIPASE 19       No results for input(s): INR in the last 72 hours. No results for input(s): Cherre Gash in the last 72 hours. Chronic labs:    Lab Results   Component Value Date    CHOL 152 12/13/2021    TRIG 120 12/13/2021    HDL 67 12/13/2021    LDLCALC 61 12/13/2021    TSH 1.410 12/13/2021    INR 1.0 09/11/2019    LABA1C 4.5 12/13/2021       Radiology: REVIEWED DAILY      ASSESSMENT and PLAN:    Hypertensive urgency  Presented with /120  C/o headache, chest pain which is now resolved  Reports compliance with home losartan however she discontinued the HCTZ due to persistent hypokalemia. Add Kcl 20 mEq daily   Started on amlodipine yesterday in addition to HCTZ and losartan.   Clonidine added this morning  Increase amlodipine to 10 mg daily starting tomorrow am  Continue PRN labetalol and hydralazine as ordered  Continue Low Na diet  If no further improvement in BP after addition of 4th antihypertensive agent will consider referral to nephrology outpatient however would like to see systolics consistently below 160 mmHg prior to discharge    Chest pain  In the setting of above  High sensitivity troponin 11 x 2  Recent pharm stress was obtained in September of last year, low risk study  Denies chest pain presently    Low bicarbonate level with elevated anion gap  New today  Lactic acid 1.1 on admission  No uremia  Probable starvation ketoacidosis- BHB 0.82    Primary hyperparathyroidism  Had workup in 2019 including parathyroid scan which was negative for adenoma  At that time she responded well to IV fluids  Holding fluid at present time in the setting of severe HTN  Ionized calcium 1.41 this am    Hx of hep C    Tobacco abuse  Nicotine patch ordered        DISPOSITION: Change status to inpatient- plan for discharge to home tomorrow or Tuesday if BP improves, goal systolic BP consistently below 160 mmHg     +++++++++++++++++++++++++++++++++++++++++++++++++  Henrique [address] [place], [place]  +++++++++++++++++++++++++++++++++++++++++++++++++  NOTE: This report was transcribed using voice recognition software. Every effort was made to ensure accuracy; however, inadvertent computerized transcription errors may be present.

## 2022-08-01 ENCOUNTER — TELEPHONE (OUTPATIENT)
Dept: OTHER | Facility: CLINIC | Age: 67
End: 2022-08-01

## 2022-08-01 ENCOUNTER — APPOINTMENT (OUTPATIENT)
Dept: ULTRASOUND IMAGING | Age: 67
DRG: 305 | End: 2022-08-01
Payer: MEDICARE

## 2022-08-01 VITALS
HEIGHT: 63 IN | HEART RATE: 71 BPM | OXYGEN SATURATION: 98 % | SYSTOLIC BLOOD PRESSURE: 152 MMHG | TEMPERATURE: 98.6 F | WEIGHT: 127 LBS | BODY MASS INDEX: 22.5 KG/M2 | DIASTOLIC BLOOD PRESSURE: 92 MMHG | RESPIRATION RATE: 16 BRPM

## 2022-08-01 PROCEDURE — 6370000000 HC RX 637 (ALT 250 FOR IP): Performed by: FAMILY MEDICINE

## 2022-08-01 PROCEDURE — 2580000003 HC RX 258: Performed by: FAMILY MEDICINE

## 2022-08-01 PROCEDURE — 6370000000 HC RX 637 (ALT 250 FOR IP): Performed by: NURSE PRACTITIONER

## 2022-08-01 PROCEDURE — 93975 VASCULAR STUDY: CPT | Performed by: RADIOLOGY

## 2022-08-01 PROCEDURE — 76770 US EXAM ABDO BACK WALL COMP: CPT

## 2022-08-01 PROCEDURE — 76770 US EXAM ABDO BACK WALL COMP: CPT | Performed by: RADIOLOGY

## 2022-08-01 PROCEDURE — 93975 VASCULAR STUDY: CPT

## 2022-08-01 RX ORDER — POTASSIUM CHLORIDE 20 MEQ/1
10 TABLET, EXTENDED RELEASE ORAL
Qty: 60 TABLET | Refills: 3 | Status: SHIPPED | OUTPATIENT
Start: 2022-08-02 | End: 2022-08-01 | Stop reason: SDUPTHER

## 2022-08-01 RX ORDER — AMLODIPINE BESYLATE 10 MG/1
10 TABLET ORAL DAILY
Qty: 30 TABLET | Refills: 3 | Status: SHIPPED | OUTPATIENT
Start: 2022-08-02 | End: 2022-08-01 | Stop reason: SDUPTHER

## 2022-08-01 RX ORDER — ASPIRIN 81 MG/1
81 TABLET, CHEWABLE ORAL DAILY
Qty: 30 TABLET | Refills: 3 | Status: SHIPPED | OUTPATIENT
Start: 2022-08-02

## 2022-08-01 RX ORDER — ASPIRIN 81 MG/1
81 TABLET, CHEWABLE ORAL DAILY
Qty: 30 TABLET | Refills: 3 | Status: SHIPPED | OUTPATIENT
Start: 2022-08-02 | End: 2022-08-01 | Stop reason: SDUPTHER

## 2022-08-01 RX ORDER — AMLODIPINE BESYLATE 10 MG/1
10 TABLET ORAL DAILY
Qty: 30 TABLET | Refills: 3 | Status: SHIPPED | OUTPATIENT
Start: 2022-08-02

## 2022-08-01 RX ORDER — HYDROCHLOROTHIAZIDE 50 MG/1
50 TABLET ORAL DAILY
Qty: 30 TABLET | Refills: 3 | Status: SHIPPED | OUTPATIENT
Start: 2022-08-01

## 2022-08-01 RX ORDER — POTASSIUM CHLORIDE 20 MEQ/1
10 TABLET, EXTENDED RELEASE ORAL
Qty: 60 TABLET | Refills: 3 | Status: SHIPPED | OUTPATIENT
Start: 2022-08-02

## 2022-08-01 RX ADMIN — Medication 10 ML: at 08:21

## 2022-08-01 RX ADMIN — AMLODIPINE BESYLATE 10 MG: 10 TABLET ORAL at 08:21

## 2022-08-01 RX ADMIN — POTASSIUM CHLORIDE 20 MEQ: 1500 TABLET, EXTENDED RELEASE ORAL at 08:21

## 2022-08-01 RX ADMIN — ASPIRIN 81 MG CHEWABLE TABLET 81 MG: 81 TABLET CHEWABLE at 08:27

## 2022-08-01 RX ADMIN — LOSARTAN POTASSIUM 100 MG: 50 TABLET, FILM COATED ORAL at 08:21

## 2022-08-01 RX ADMIN — HYDROCHLOROTHIAZIDE 50 MG: 25 TABLET ORAL at 08:21

## 2022-08-01 NOTE — TELEPHONE ENCOUNTER
Pt was contacted today as part of [facility] to schedule a Mammogram.       I left a message reminding the patient that they have an open order from Darlene Mckeon,  and to please contact me directly at [phone] to schedule a Mammogram.     Thanks,  Kiya Woods LPN

## 2022-08-01 NOTE — PLAN OF CARE
-Reviewed chart for possible HF, at time of review patient does not have history of nor has been admitted for HF  -Advised to follow up with PCP post hospital discharge.

## 2022-08-01 NOTE — DISCHARGE SUMMARY
Hospitalist Discharge Summary    Patient ID: Rhoda Andrade   Patient : 1955  Patient's PCP: Lavelle Lennon DO    Admit Date: 2022   Admitting Physician: Alayna Cruz MD    Discharge Date:  2022  Discharge Physician: NINO Mancilla - CNP   Discharge Condition: Stable  Discharge Disposition: Home    History of presenting illness/hospital course:    Ms. Melina Feldman is a 77year old female with a PMH of HTN , hep C and primary hyperparathyroidism who presented to the ED on  after being referred from an urgent care for HTN. She reported that she went to the urgent care because she was \"not feeling well. \" On arrival to the ED she was noted to have a BP of 244/120. She endorsed some left-sided chest pain as well as shortness of breath. EKG in the ED revealed nonspecific ST changes. Troponin was 11, repeat 11. CXR negative. She was admitted with order for exercise stress test.     On review of her chart she had a pharm stress in 2021 which was low risk and estimated an EF of >70%. Her blood pressures were labile overnight and she denied any complaints of chest pain when her pressure was controlled with systolics <489 mmHg. Home hydrochlorothiazide and losartan were resumed. She admitted to discontinuing the HCTZ at home due to persistent hypokalemia. Hydrochlorothiazide and losartan were resumed. She received hydralazine and labetalol IV as needed during the admission. Clonidine was added however later discontinued due to concerns for rebound in the event of noncompliance. Amlodipine was added in its place and was uptitrated to 10 mg. An ultrasound was obtained to rule out renal artery stenosis and no hemodynamically significant stenosis was seen. Her blood pressures improved with the above regimen. Her headache resolved and she had no further complaints of chest pain.     She is stable for discharge to home with follow-up with PCP within 2 weeks.  If blood pressures remain elevated despite the present regimen recommend referral to nephrology for resistant hypertension. Consults:   IP CONSULT TO HOSPITALIST    Discharge Diagnoses:      Hypertensive urgency  Presented with /120  C/o headache, chest pain which is now resolved  Reports compliance with home losartan however she discontinued the HCTZ due to persistent hypokalemia. Add Kcl 10 mEq daily   Clonidine discontinued after one dose due to risk for rebound with noncompliance  Amlodipine increased to 10 mg , continue losartan 100 mg daily and HCTZ 50 mg daily  Continue PRN labetalol and hydralazine as ordered  US negative for hemodynamically significant PAMELLA  Continue Low Na diet  Recommend outpatient nephrology followup for resistant HTN      Chest pain  In the setting of above  High sensitivity troponin 11 x 2  Recent pharm stress was obtained in September of last year, low risk study  Denies chest pain after BP controlled    Primary hyperparathyroidism  Had workup in 2019 including parathyroid scan which was negative for adenoma  At that time she responded well to IV fluids  Holding fluid at present time in the setting of severe HTN  Ionized calcium 1.41      Hx of hep C     Tobacco abuse  Nicotine patch ordered       Discharge Instructions / Follow up:    Medications as prescribed. Follow up with PCP within 2 weeks    Activity: activity as tolerated    Significant labs:  CBC:   Recent Labs     07/29/22  1435   WBC 4.9   RBC 5.14   HGB 17.0*   HCT 49.0*   MCV 95.3   RDW 12.6   *     BMP:   Recent Labs     07/29/22  1435 07/31/22  0535    136   K 4.3 3.7    104   CO2 21* 14*   BUN 18 17   CREATININE 1.0 1.0   PHOS  --  3.1     LFT:  Recent Labs     07/29/22  1435   PROT 8.2   ALKPHOS 85   ALT 21   AST 34*   BILITOT 1.2   LIPASE 19     PT/INR: No results for input(s): INR, APTT in the last 72 hours. BNP: No results for input(s): BNP in the last 72 hours.   Hgb A1C: Lab Results   Component Value Date    LABA1C 4.5 12/13/2021     Folate and B12: No results found for: Marolga Merrill, No results found for: FOLATE  Thyroid Studies:   Lab Results   Component Value Date    TSH 1.410 12/13/2021       Urinalysis:    Lab Results   Component Value Date/Time    NITRU Negative 07/15/2020 01:32 AM    WBCUA 1-3 07/15/2020 01:32 AM    BACTERIA RARE 07/15/2020 01:32 AM    RBCUA NONE 07/15/2020 01:32 AM    BLOODU Negative 07/15/2020 01:32 AM    SPECGRAV 1.010 07/15/2020 01:32 AM    GLUCOSEU Negative 07/15/2020 01:32 AM       Imaging:  XR CHEST (2 VW)    Result Date: 7/29/2022  EXAMINATION: TWO XRAY VIEWS OF THE CHEST 7/29/2022 3:37 pm COMPARISON: None. HISTORY: ORDERING SYSTEM PROVIDED HISTORY: chest pain TECHNOLOGIST PROVIDED HISTORY: Reason for exam:->chest pain What reading provider will be dictating this exam?->CRC FINDINGS: The lungs are without acute focal process. There is no effusion or pneumothorax. The cardiomediastinal silhouette is without acute process. The osseous structures are without acute process. No acute process. CT HEAD WO CONTRAST    Result Date: 7/29/2022  EXAMINATION: CT OF THE HEAD WITHOUT CONTRAST  7/29/2022 4:53 pm TECHNIQUE: CT of the head was performed without the administration of intravenous contrast. Automated exposure control, iterative reconstruction, and/or weight based adjustment of the mA/kV was utilized to reduce the radiation dose to as low as reasonably achievable. COMPARISON: None. HISTORY: ORDERING SYSTEM PROVIDED HISTORY: headache- HTN TECHNOLOGIST PROVIDED HISTORY: Reason for exam:->headache- HTN Has a \"code stroke\" or \"stroke alert\" been called? ->No Decision Support Exception - unselect if not a suspected or confirmed emergency medical condition->Emergency Medical Condition (MA) What reading provider will be dictating this exam?->CRC FINDINGS: BRAIN/VENTRICLES: There is no acute intracranial hemorrhage, mass effect or midline shift.   No abnormal extra-axial fluid collection. The gray-white differentiation is maintained without evidence of an acute infarct. There is no evidence of hydrocephalus. ORBITS: The visualized portion of the orbits demonstrate no acute abnormality. SINUSES: The visualized paranasal sinuses and mastoid air cells demonstrate no acute abnormality. SOFT TISSUES/SKULL:  No acute abnormality of the visualized skull or soft tissues. No acute intracranial abnormality. US DUP ABD PEL RETRO SCROT COMPLETE    Result Date: 2022  Patient MRN:  [id number] : 1955 Age: 77 years Gender: Female Order Date:  2022 9:02 AM EXAM: US RETROPERITONEAL PAMELLA, US DUP ABD PEL RETRO SCROT COMPLETE NUMBER OF IMAGES:  68 INDICATION:  resistant HTN resistant HTN What reading provider will be dictating this exam?->[facility] COMPARISON: None The right kidney 10.6 x 5.1 x 5.3 cm The left kidney  10.9 x 4.6 x 6.0 cm There is there is no mass identified in the left kidney measuring 1.7 x 1.5 x 1.5 cm most compatible with a cyst. There is no hydronephrosis identified. There is no calculus identified. The bladder is unremarkable. Velocity within the aorta measure 82 cm/sec. Velocities in the right renal artery measure 117 cm/sec,  renal aortic ratio is 1.4. Velocities within the left renal artery measure 180 cm/sec, renal aortic ratio on the left is 2.2.     1.  No evidence for hemodynamically significant renal artery stenosis.     US RETROPERITONEAL PAMELLA    Result Date: 2022  Patient MRN:  [id number] : 1955 Age: 77 years Gender: Female Order Date:  2022 9:02 AM EXAM: US RETROPERITONEAL PAMELLA, US DUP ABD PEL RETRO SCROT COMPLETE NUMBER OF IMAGES:  68 INDICATION:  resistant HTN resistant HTN What reading provider will be dictating this exam?->[facility] COMPARISON: None The right kidney 10.6 x 5.1 x 5.3 cm The left kidney  10.9 x 4.6 x 6.0 cm There is there is no mass identified in the left kidney measuring 1.7 x 1.5 x 1.5 cm most compatible with a cyst. There is no hydronephrosis identified. There is no calculus identified. The bladder is unremarkable. Velocity within the aorta measure 82 cm/sec. Velocities in the right renal artery measure 117 cm/sec,  renal aortic ratio is 1.4. Velocities within the left renal artery measure 180 cm/sec, renal aortic ratio on the left is 2.2.     1.  No evidence for hemodynamically significant renal artery stenosis. Discharge Medications:      Medication List        START taking these medications      amLODIPine 10 MG tablet  Commonly known as: NORVASC  Take 1 tablet by mouth in the morning. Start taking on: August 2, 2022     aspirin 81 MG chewable tablet  Take 1 tablet by mouth in the morning. Start taking on: August 2, 2022     potassium chloride 20 MEQ extended release tablet  Commonly known as: KLOR-CON M  Take 0.5 tablets by mouth daily (with breakfast)  Start taking on: August 2, 2022            CONTINUE taking these medications      Aloe Vesta Clear Antifungal 2 % Oint  Generic drug: miconazole nitrate  APPLY TOPICALLY TO THE AFFECTED AREA TWICE DAILY     hydroCHLOROthiazide 50 MG tablet  Commonly known as: HYDRODIURIL  Take 1 tablet by mouth in the morning.      losartan 100 MG tablet  Commonly known as: COZAAR  TAKE 1 TABLET BY MOUTH DAILY     nicotine 14 MG/24HR  Commonly known as: NICODERM CQ  Place 1 patch onto the skin daily               Where to Get Your Medications        These medications were sent to [id number] Jolie Shirley [address] [place] [address],[facility] [phone] [address]      Phone: [phone]   amLODIPine 10 MG tablet  aspirin 81 MG chewable tablet  hydroCHLOROthiazide 50 MG tablet  potassium chloride 20 MEQ extended release tablet         Time Spent on discharge is more than 35 minutes in the examination, evaluation, counseling and review of medications and discharge plan.    +++++++++++++++++++++++++++++++++++++++++++++++++  Christen Ceja, APRN - CNP ACNP  [employer] - Hospitalist  [address] [facility], [place]  +++++++++++++++++++++++++++++++++++++++++++++++++  NOTE: This report was transcribed using voice recognition software. Every effort was made to ensure accuracy; however, inadvertent computerized transcription errors may be present.

## 2022-08-02 ENCOUNTER — TELEPHONE (OUTPATIENT)
Dept: FAMILY MEDICINE CLINIC | Age: 67
End: 2022-08-02

## 2022-08-02 NOTE — TELEPHONE ENCOUNTER
[facility] Initial Follow Up Call    Outreach made within 2 business days of discharge: Yes    Patient: Dee Salazar Patient : 1955   MRN: [id number]  Reason for Admission: Chest pain in adult  Discharge Date: 22       Spoke with: Agus Tsai    Discharge department/facility: [facility] Interactive Patient Contact:  Was patient able to fill all prescriptions: Yes  Was patient instructed to bring all medications to the follow-up visit: Yes  Is patient taking all medications as directed in the discharge summary?  Yes  Does patient understand their discharge instructions: Yes  Does patient have questions or concerns that need addressed prior to 7-14 day follow up office visit: no    Scheduled appointment with PCP within 7-14 days    Follow Up  Future Appointments   Date Time Provider Berenice Dixon   8/15/2022  3:45 PM DO Cata Rick [address] [facility], [address] [place] [address] [place]

## 2022-08-03 ENCOUNTER — TELEPHONE (OUTPATIENT)
Dept: FAMILY MEDICINE CLINIC | Age: 67
End: 2022-08-03

## 2022-08-03 ENCOUNTER — OFFICE VISIT (OUTPATIENT)
Dept: FAMILY MEDICINE CLINIC | Age: 67
End: 2022-08-03
Payer: MEDICARE

## 2022-08-03 VITALS
SYSTOLIC BLOOD PRESSURE: 114 MMHG | HEIGHT: 63 IN | RESPIRATION RATE: 18 BRPM | TEMPERATURE: 97.1 F | OXYGEN SATURATION: 97 % | HEART RATE: 75 BPM | BODY MASS INDEX: 22.64 KG/M2 | DIASTOLIC BLOOD PRESSURE: 76 MMHG | WEIGHT: 127.8 LBS

## 2022-08-03 DIAGNOSIS — I10 RESISTANT HYPERTENSION: Primary | ICD-10-CM

## 2022-08-03 DIAGNOSIS — B18.2 CHRONIC HEPATITIS C WITHOUT HEPATIC COMA (HCC): ICD-10-CM

## 2022-08-03 DIAGNOSIS — Z72.0 TOBACCO ABUSE: ICD-10-CM

## 2022-08-03 DIAGNOSIS — Z09 HOSPITAL DISCHARGE FOLLOW-UP: ICD-10-CM

## 2022-08-03 PROBLEM — I1A.0 RESISTANT HYPERTENSION: Status: ACTIVE | Noted: 2019-09-12

## 2022-08-03 PROCEDURE — 99496 TRANSJ CARE MGMT HIGH F2F 7D: CPT | Performed by: FAMILY MEDICINE

## 2022-08-03 PROCEDURE — 1111F DSCHRG MED/CURRENT MED MERGE: CPT | Performed by: FAMILY MEDICINE

## 2022-08-03 RX ORDER — NICOTINE 21 MG/24HR
1 PATCH, TRANSDERMAL 24 HOURS TRANSDERMAL DAILY
Qty: 42 PATCH | Refills: 0 | Status: SHIPPED | OUTPATIENT
Start: 2022-08-03 | End: 2022-09-14

## 2022-08-03 NOTE — LETTER
ALEIDA Acevedo [address]  Phone: [phone]  Fax: [phone] Meryl Montgomery DO        August 3, 2022     Patient: Leopold Bailiff   YOB: 1955   Date of Visit: 8/3/2022       To Whom It May Concern: It is my medical opinion that Leopold Bailiff may return to work on 8/9/2022. If you have any questions or concerns, please don't hesitate to call.     Sincerely,        Sirena Parr DO

## 2022-08-03 NOTE — PROGRESS NOTES
Post-Discharge Transitional Care  Follow Up      Dennis Damon   YOB: 1955    Date of Office Visit:  8/3/2022  Date of Hospital Admission: 7/29/22  Date of Hospital Discharge: 8/1/22  Risk of hospital readmission (high >=14%. Medium >=10%) :Readmission Risk Score: 7.4      Care management risk score Rising risk (score 2-5) and Complex Care (Scores >=6): No Risk Score On File     Non face to face  following discharge, date last encounter closed (first attempt may have been earlier): 8/2/2022  3:30 PM    Call initiated 2 business days of discharge: Yes    ASSESSMENT/PLAN:   Resistant hypertension  -     Comprehensive Metabolic Panel; Future  -     CBC with Auto Differential; Future  Labs ordered  Discussed referral to  nephrology  Patient is not ready for that at this time   Diet and exercise were discussed and recommended to the patient. Continue current meds. Tobacco abuse  -     nicotine (NICODERM CQ) 14 MG/24HR; Place 1 patch onto the skin in the morning., Disp-42 patch, R-0Normal  Patient advised that smoking is contributing to their illness. Cessation encouraged. Picking a quit date was encouraged. Chronic hepatitis C without hepatic coma ([facility] [address]  Follows with GI  Hospital discharge follow-up  -     [place] DISCHARGE MEDS RECONCILED W/ CURRENT OUTPATIENT MED LIST      Medical Decision Making: high complexity  Return in about 4 weeks (around 8/31/2022) for AWV. Subjective:   HPI:  Follow up of Hospital problems/diagnosis(es): hypertensive urgency  Chest pain  Primary hyperparathyroidism  Hx of hepatitis c  Tobacco abuse    Inpatient course: Discharge summary reviewed- see chart. Interval history/Current status: patient states that she woke and just felt exhausted. She states that she developed chest pain and went to urgent care for evaluation and her blood pressure was 200's/170's. She states that she was diagnosed with hypertensive urgency. She had high troponin.   She had a recent stress test.  Chest pain resolved after her blood pressure was controlled. She states that she no longer has chest pain, shortness of breath. She had had improvement in her appetite. She denies headaches, dizziness, lightheadedness. Patient Active Problem List   Diagnosis    Chronic hepatitis C without hepatic coma (HCC)    Hypercalcemia    Acute kidney injury ([facility] [address]    Resistant hypertension    Moderate protein-calorie malnutrition ([facility] [address]    Hypertensive urgency    Chest pain in adult       Medications listed as ordered at the time of discharge from hospital     Medication List            Accurate as of August 3, 2022  5:17 PM. If you have any questions, ask your nurse or doctor. CONTINUE taking these medications      Aloe Vesta Clear Antifungal 2 % Oint  Generic drug: miconazole nitrate  APPLY TOPICALLY TO THE AFFECTED AREA TWICE DAILY     amLODIPine 10 MG tablet  Commonly known as: NORVASC  Take 1 tablet by mouth in the morning. aspirin 81 MG chewable tablet  Take 1 tablet by mouth in the morning. hydroCHLOROthiazide 50 MG tablet  Commonly known as: HYDRODIURIL  Take 1 tablet by mouth in the morning. losartan 100 MG tablet  Commonly known as: COZAAR  TAKE 1 TABLET BY MOUTH DAILY     nicotine 14 MG/24HR  Commonly known as: NICODERM CQ  Place 1 patch onto the skin in the morning.      potassium chloride 20 MEQ extended release tablet  Commonly known as: KLOR-CON M  Take 0.5 tablets by mouth daily (with breakfast)               Where to Get Your Medications        These medications were sent to [place] [address] [address] [facility], [address]  [address]      Phone: [phone]   nicotine 14 MG/24HR           Medications marked \"taking\" at this time  Outpatient Medications Marked as Taking for the 8/3/22 encounter (Office Visit) with Pj Carrera, DO   Medication Sig Dispense Refill    nicotine (NICODERM CQ) 14 MG/24HR Place 1 patch onto the skin in the morning. 42 patch 0    hydroCHLOROthiazide (HYDRODIURIL) 50 MG tablet Take 1 tablet by mouth in the morning. 30 tablet 3    aspirin 81 MG chewable tablet Take 1 tablet by mouth in the morning. 30 tablet 3    potassium chloride (KLOR-CON M) 20 MEQ extended release tablet Take 0.5 tablets by mouth daily (with breakfast) 60 tablet 3    amLODIPine (NORVASC) 10 MG tablet Take 1 tablet by mouth in the morning. 30 tablet 3    losartan (COZAAR) 100 MG tablet TAKE 1 TABLET BY MOUTH DAILY 30 tablet 5    ALOE VESTA CLEAR ANTIFUNGAL 2 % OINT APPLY TOPICALLY TO THE AFFECTED AREA TWICE DAILY 56 g 0        Medications patient taking as of now reconciled against medications ordered at time of hospital discharge: Yes    A comprehensive review of systems was negative except for what was noted in the HPI.     Objective:    /76   Pulse 75   Temp 97.1 °F (36.2 °C)   Resp 18   Ht 5' 3\" (1.6 m)   Wt 127 lb 12.8 oz (58 kg)   SpO2 97%   BMI 22.64 kg/m²   General Appearance: alert and oriented to person, place and time, well developed and well- nourished, in no acute distress  Skin: warm and dry, no rash or erythema  Head: normocephalic and atraumatic  Eyes: pupils equal, round, and reactive to light, extraocular eye movements intact, conjunctivae normal  ENT: tympanic membrane, external ear and ear canal normal bilaterally, nose without deformity, nasal mucosa and turbinates normal without polyps  Neck: supple and non-tender without mass, no thyromegaly or thyroid nodules, no cervical lymphadenopathy  Pulmonary/Chest: clear to auscultation bilaterally- no wheezes, rales or rhonchi, normal air movement, no respiratory distress  Cardiovascular: normal rate, regular rhythm, normal S1 and S2, no murmurs, rubs, clicks, or gallops, distal pulses intact, no carotid bruits  Abdomen: soft, non-tender, non-distended, normal bowel sounds, no masses or organomegaly  Extremities: no cyanosis, clubbing or edema  Musculoskeletal: normal range of motion, no joint swelling, deformity or tenderness  Neurologic: reflexes normal and symmetric, no cranial nerve deficit, gait, coordination and speech normal      An electronic signature was used to authenticate this note.   --Adrian Rose, DO

## 2022-08-03 NOTE — TELEPHONE ENCOUNTER
----- Message from Kirby Joel sent at 8/3/2022  9:32 AM EDT -----  Subject: Hospital Follow Up    QUESTIONS  What hospital was the Patient Discharged from? [address]  Date of Discharge? 2022-08-01  Discharge Location? Home  Reason for hospitalization as patient stated? Elevated BP with chest pain. Patient needs to be clear for return to work and can't wait until her 8/15   appt to be seen/cleared. Please see if there is any way to get the patient   in as soon as possible. What question does the patient have, if applicable?   ---------------------------------------------------------------------------  --------------  CALL BACK INFO  What is the best way for the office to contact you? OK to leave message on   voicemail  Preferred Call Back Phone Number? [phone]  ---------------------------------------------------------------------------  --------------  SCRIPT ANSWERS  Relationship to Patient? Self  (Has the patient been discharged from the hospital within 2 business days   AND does not have a Telephone Encounter  Follow Up From [address] [place] [address]   documented in [facility]?)?  Yes

## 2022-08-03 NOTE — TELEPHONE ENCOUNTER
Received call from [facility] ([facility]) and patient is requesting an appointment for hospital follow up. Pt is wanting to return to work and is not able to without a letter from her doctor. Patient is scheduled for 8/15/22 but states she is not able to be out of work for 2 weeks. Please advise of sooner appointment or further instructions?

## 2022-08-05 DIAGNOSIS — I10 UNCONTROLLED HYPERTENSION: ICD-10-CM

## 2022-08-05 RX ORDER — LOSARTAN POTASSIUM 100 MG/1
100 TABLET ORAL DAILY
Qty: 30 TABLET | Refills: 5 | Status: SHIPPED | OUTPATIENT
Start: 2022-08-05

## 2022-08-15 ENCOUNTER — OFFICE VISIT (OUTPATIENT)
Dept: FAMILY MEDICINE CLINIC | Age: 67
End: 2022-08-15
Payer: MEDICARE

## 2022-08-15 VITALS
HEART RATE: 83 BPM | BODY MASS INDEX: 21.97 KG/M2 | SYSTOLIC BLOOD PRESSURE: 114 MMHG | HEIGHT: 62 IN | DIASTOLIC BLOOD PRESSURE: 62 MMHG | RESPIRATION RATE: 18 BRPM | OXYGEN SATURATION: 99 % | TEMPERATURE: 97.4 F | WEIGHT: 119.4 LBS

## 2022-08-15 DIAGNOSIS — Z00.00 INITIAL MEDICARE ANNUAL WELLNESS VISIT: Primary | ICD-10-CM

## 2022-08-15 DIAGNOSIS — R63.4 WEIGHT LOSS: ICD-10-CM

## 2022-08-15 DIAGNOSIS — B18.2 CHRONIC HEPATITIS C WITHOUT HEPATIC COMA (HCC): ICD-10-CM

## 2022-08-15 DIAGNOSIS — K21.9 GASTROESOPHAGEAL REFLUX DISEASE, UNSPECIFIED WHETHER ESOPHAGITIS PRESENT: ICD-10-CM

## 2022-08-15 DIAGNOSIS — Z12.11 COLON CANCER SCREENING: ICD-10-CM

## 2022-08-15 DIAGNOSIS — R11.0 NAUSEA: ICD-10-CM

## 2022-08-15 DIAGNOSIS — I10 HYPERTENSION, UNSPECIFIED TYPE: ICD-10-CM

## 2022-08-15 PROCEDURE — G0438 PPPS, INITIAL VISIT: HCPCS | Performed by: FAMILY MEDICINE

## 2022-08-15 PROCEDURE — 1123F ACP DISCUSS/DSCN MKR DOCD: CPT | Performed by: FAMILY MEDICINE

## 2022-08-15 RX ORDER — ONDANSETRON 4 MG/1
4 TABLET, FILM COATED ORAL 3 TIMES DAILY PRN
Qty: 30 TABLET | Refills: 0 | Status: SHIPPED | OUTPATIENT
Start: 2022-08-15

## 2022-08-15 RX ORDER — OMEPRAZOLE 20 MG/1
20 CAPSULE, DELAYED RELEASE ORAL
Qty: 30 CAPSULE | Refills: 5 | Status: SHIPPED | OUTPATIENT
Start: 2022-08-15

## 2022-08-15 SDOH — HEALTH STABILITY: PHYSICAL HEALTH: ON AVERAGE, HOW MANY MINUTES DO YOU ENGAGE IN EXERCISE AT THIS LEVEL?: 120 MIN

## 2022-08-15 SDOH — HEALTH STABILITY: PHYSICAL HEALTH: ON AVERAGE, HOW MANY DAYS PER WEEK DO YOU ENGAGE IN MODERATE TO STRENUOUS EXERCISE (LIKE A BRISK WALK)?: 2 DAYS

## 2022-08-15 ASSESSMENT — PATIENT HEALTH QUESTIONNAIRE - PHQ9
2. FEELING DOWN, DEPRESSED OR HOPELESS: 1
SUM OF ALL RESPONSES TO PHQ9 QUESTIONS 1 & 2: 1
SUM OF ALL RESPONSES TO PHQ QUESTIONS 1-9: 1
1. LITTLE INTEREST OR PLEASURE IN DOING THINGS: 0
SUM OF ALL RESPONSES TO PHQ QUESTIONS 1-9: 1

## 2022-08-15 ASSESSMENT — LIFESTYLE VARIABLES
HOW MANY STANDARD DRINKS CONTAINING ALCOHOL DO YOU HAVE ON A TYPICAL DAY: 1
HOW OFTEN DO YOU HAVE A DRINK CONTAINING ALCOHOL: MONTHLY OR LESS
HOW OFTEN DO YOU HAVE A DRINK CONTAINING ALCOHOL: 2
HOW MANY STANDARD DRINKS CONTAINING ALCOHOL DO YOU HAVE ON A TYPICAL DAY: 1 OR 2
HOW OFTEN DO YOU HAVE SIX OR MORE DRINKS ON ONE OCCASION: 1

## 2022-08-15 NOTE — PATIENT INSTRUCTIONS
Personalized Preventive Plan for Shreya Samuel - 8/15/2022  Medicare offers a range of preventive health benefits. Some of the tests and screenings are paid in full while other may be subject to a deductible, co-insurance, and/or copay. Some of these benefits include a comprehensive review of your medical history including lifestyle, illnesses that may run in your family, and various assessments and screenings as appropriate. After reviewing your medical record and screening and assessments performed today your provider may have ordered immunizations, labs, imaging, and/or referrals for you. A list of these orders (if applicable) as well as your Preventive Care list are included within your After Visit Summary for your review. Other Preventive Recommendations:    A preventive eye exam performed by an eye specialist is recommended every 1-2 years to screen for glaucoma; cataracts, macular degeneration, and other eye disorders. A preventive dental visit is recommended every 6 months. Try to get at least 150 minutes of exercise per week or 10,000 steps per day on a pedometer . Order or download the FREE \"Exercise & Physical Activity: Your Everyday Guide\" from [employer]. Call 3-500.723.7298 or search [employer] online. You need 5985-3155 mg of calcium and 5811-1483 IU of vitamin D per day. It is possible to meet your calcium requirement with diet alone, but a vitamin D supplement is usually necessary to meet this goal.  When exposed to the sun, use a sunscreen that protects against both UVA and UVB radiation with an SPF of 30 or greater. Reapply every 2 to 3 hours or after sweating, drying off with a towel, or swimming. Always wear a seat belt when traveling in a car. Always wear a helmet when riding a bicycle or motorcycle.

## 2022-08-15 NOTE — PROGRESS NOTES
factors: hypertension and smoking/ tobacco exposure. Patient does smoke. Is currently on cozaar 100 mg daily, HCTZ 50 mg daily, norvasc 10 mg daily. Taking as prescribed. She is on potassium 10 meq daily and states that she has been having nausea with that. She states that she was told to take it with food but she just can't eat. She has lost 8 pounds since her last visit due to not being able to eat. Patient's complete Health Risk Assessment and screening values have been reviewed and are found in Flowsheets. The following problems were reviewed today and where indicated follow up appointments were made and/or referrals ordered. Positive Risk Factor Screenings with Interventions:    Fall Risk:  Do you feel unsteady or are you worried about falling? : (!) yes  2 or more falls in past year?: no  Fall with injury in past year?: no   Fall Risk Interventions:    Home safety tips provided      Tobacco Use:  Tobacco Use: High Risk    Smoking Tobacco Use: Every Day    Smokeless Tobacco Use: Never     E-cigarette/Vaping       Questions Responses    E-cigarette/Vaping Use     Start Date     Passive Exposure     Quit Date     Counseling Given     Comments           Substance Use - Tobacco Interventions:  tobacco cessation tips and resources provided, she is using nicotine patch as prescribed.   She states that she is only smoking 2 cigarettes a day         General Health and ACP:  General  In general, how would you say your health is?: Good  In the past 7 days, have you experienced any of the following: New or Increased Pain, New or Increased Fatigue, Loneliness, Social Isolation, Stress or Anger?: (!) Yes  Select all that apply: (!) New or Increased Fatigue  Do you get the social and emotional support that you need?: Yes  Do you have a Living Will?: (!) No    Advance Directives       Power of [address] [place] [address] Will ACP-Advance Directive ACP-Power of     Not on File Not on File Not on File Not on File General Health Risk Interventions:  Fatigue: tired from not eating  No Living Will: Patient declines ACP discussion/assistance    Health Habits/Nutrition:  Physical Activity: Sufficiently Active    Days of Exercise per Week: 2 days    Minutes of Exercise per Session: 120 min     Have you lost any weight without trying in the past 3 months?: (!) Yes  Body mass index: 21.84  Have you seen the dentist within the past year?: (!) No  Health Habits/Nutrition Interventions:  Nutritional issues:  educational materials for healthy, well-balanced diet provided  Dental exam overdue:  patient encouraged to make appointment with his/her dentist    Hearing/Vision:  Do you or your family notice any trouble with your hearing that hasn't been managed with hearing aids?: No  Do you have difficulty driving, watching TV, or doing any of your daily activities because of your eyesight?: (!) Yes  Have you had an eye exam within the past year?: Yes  No results found. Hearing/Vision Interventions:  Vision concerns:  following with optometry            Objective   Vitals:    08/15/22 1544   BP: 114/62   Pulse: 83   Resp: 18   Temp: 97.4 °F (36.3 °C)   SpO2: 99%   Weight: 119 lb 6.4 oz (54.2 kg)   Height: 5' 2\" (1.575 m)      Body mass index is 21.84 kg/m².       General Appearance: alert and oriented to person, place and time, well developed and well- nourished, in no acute distress  Skin: warm and dry, no rash or erythema  Head: normocephalic and atraumatic  Eyes: pupils equal, round, and reactive to light, extraocular eye movements intact, conjunctivae normal  ENT: tympanic membrane, external ear and ear canal normal bilaterally, nose without deformity, nasal mucosa and turbinates normal without polyps  Neck: supple and non-tender without mass, no thyromegaly or thyroid nodules, no cervical lymphadenopathy  Pulmonary/Chest: clear to auscultation bilaterally- no wheezes, rales or rhonchi, normal air movement, no respiratory distress  Cardiovascular: normal rate, regular rhythm, normal S1 and S2, no murmurs, rubs, clicks, or gallops, distal pulses intact, no carotid bruits  Abdomen: soft, epigastric tenderness, non-distended, normal bowel sounds, no masses or organomegaly  Extremities: no cyanosis, clubbing or edema  Musculoskeletal: normal range of motion, no joint swelling, deformity or tenderness  Neurologic: reflexes normal and symmetric, no cranial nerve deficit, gait, coordination and speech normal       Allergies   Allergen Reactions    Morphine      Patient states she had a panic attack with this     Prior to Visit Medications    Medication Sig Taking? Authorizing Provider   omeprazole (PRILOSEC) 20 MG delayed release capsule Take 1 capsule by mouth every morning (before breakfast) Yes Ava Fraser DO   ondansetron (ZOFRAN) 4 MG tablet Take 1 tablet by mouth 3 times daily as needed for Nausea or Vomiting Yes Ava Fraser DO   losartan (COZAAR) 100 MG tablet Take 1 tablet by mouth in the morning. Yes Mikey Kelley DO   nicotine (NICODERM CQ) 14 MG/24HR Place 1 patch onto the skin in the morning. Yes Lissa Fraser DO   hydroCHLOROthiazide (HYDRODIURIL) 50 MG tablet Take 1 tablet by mouth in the morning. Yes Trish Van, APRN - CNP   aspirin 81 MG chewable tablet Take 1 tablet by mouth in the morning. Yes Chrsitine Brown MD   potassium chloride (KLOR-CON M) 20 MEQ extended release tablet Take 0.5 tablets by mouth daily (with breakfast) Yes Lonny Robles DO   amLODIPine (NORVASC) 10 MG tablet Take 1 tablet by mouth in the morning.  Yes Lonny Robles DO   ALOE VESTA CLEAR ANTIFUNGAL 2 % OINT APPLY TOPICALLY TO THE AFFECTED AREA TWICE DAILY Yes Mikey Kelley DO       CareTeam (Including outside providers/suppliers regularly involved in providing care):   Patient Care Team:  Mikey Kelley DO as PCP - General (Family Medicine)  Mikey Kelley DO as PCP - [facility] Empaneled Provider     Reviewed and updated this visit:  Tobacco  Allergies  Meds  Problems  Med Hx  Surg Hx  Soc Hx  Fam Hx

## 2022-08-16 ENCOUNTER — HOSPITAL ENCOUNTER (OUTPATIENT)
Age: 67
Discharge: HOME | End: 2022-08-16
Payer: MEDICARE

## 2022-08-16 ENCOUNTER — TELEPHONE (OUTPATIENT)
Dept: FAMILY MEDICINE CLINIC | Age: 67
End: 2022-08-16

## 2022-08-16 ENCOUNTER — HOSPITAL ENCOUNTER (EMERGENCY)
Age: 67
Discharge: HOME | End: 2022-08-16
Attending: EMERGENCY MEDICINE
Payer: MEDICARE

## 2022-08-16 VITALS
SYSTOLIC BLOOD PRESSURE: 139 MMHG | WEIGHT: 119 LBS | OXYGEN SATURATION: 98 % | HEIGHT: 62 IN | BODY MASS INDEX: 21.9 KG/M2 | DIASTOLIC BLOOD PRESSURE: 92 MMHG | RESPIRATION RATE: 16 BRPM | TEMPERATURE: 97.4 F | HEART RATE: 81 BPM

## 2022-08-16 DIAGNOSIS — R63.4 WEIGHT LOSS: ICD-10-CM

## 2022-08-16 DIAGNOSIS — R11.0 NAUSEA: ICD-10-CM

## 2022-08-16 DIAGNOSIS — E87.6 HYPOKALEMIA: Primary | ICD-10-CM

## 2022-08-16 LAB
ALBUMIN SERPL-MCNC: 4.7 G/DL (ref 3.5–5.2)
ALP BLD-CCNC: 71 U/L (ref 35–104)
ALT SERPL-CCNC: 13 U/L (ref 0–32)
ANION GAP SERPL CALCULATED.3IONS-SCNC: 13 MMOL/L (ref 7–16)
ANION GAP SERPL CALCULATED.3IONS-SCNC: 15 MMOL/L (ref 7–16)
AST SERPL-CCNC: 19 U/L (ref 0–31)
BASOPHILS ABSOLUTE: 0.02 E9/L (ref 0–0.2)
BASOPHILS RELATIVE PERCENT: 0.4 % (ref 0–2)
BILIRUB SERPL-MCNC: 0.9 MG/DL (ref 0–1.2)
BUN BLDV-MCNC: 30 MG/DL (ref 6–23)
BUN BLDV-MCNC: 32 MG/DL (ref 6–23)
CALCIUM SERPL-MCNC: 10.9 MG/DL (ref 8.6–10.2)
CALCIUM SERPL-MCNC: 11.1 MG/DL (ref 8.6–10.2)
CHLORIDE BLD-SCNC: 99 MMOL/L (ref 98–107)
CHLORIDE BLD-SCNC: 99 MMOL/L (ref 98–107)
CO2: 22 MMOL/L (ref 22–29)
CO2: 24 MMOL/L (ref 22–29)
CREAT SERPL-MCNC: 1.4 MG/DL (ref 0.5–1)
CREAT SERPL-MCNC: 1.8 MG/DL (ref 0.5–1)
EOSINOPHILS ABSOLUTE: 0.08 E9/L (ref 0.05–0.5)
EOSINOPHILS RELATIVE PERCENT: 1.6 % (ref 0–6)
GFR AFRICAN AMERICAN: 34
GFR AFRICAN AMERICAN: 45
GFR NON-AFRICAN AMERICAN: 34 ML/MIN/1.73
GFR NON-AFRICAN AMERICAN: 45 ML/MIN/1.73
GLUCOSE BLD-MCNC: 108 MG/DL (ref 74–99)
GLUCOSE BLD-MCNC: 99 MG/DL (ref 74–99)
HCT VFR BLD CALC: 44.3 % (ref 34–48)
HEMOGLOBIN: 16.1 G/DL (ref 11.5–15.5)
IMMATURE GRANULOCYTES #: 0.02 E9/L
IMMATURE GRANULOCYTES %: 0.4 % (ref 0–5)
LYMPHOCYTES ABSOLUTE: 1.4 E9/L (ref 1.5–4)
LYMPHOCYTES RELATIVE PERCENT: 27.2 % (ref 20–42)
MAGNESIUM: 2.8 MG/DL (ref 1.6–2.6)
MCH RBC QN AUTO: 32.5 PG (ref 26–35)
MCHC RBC AUTO-ENTMCNC: 36.3 % (ref 32–34.5)
MCV RBC AUTO: 89.5 FL (ref 80–99.9)
MONOCYTES ABSOLUTE: 0.61 E9/L (ref 0.1–0.95)
MONOCYTES RELATIVE PERCENT: 11.8 % (ref 2–12)
NEUTROPHILS ABSOLUTE: 3.02 E9/L (ref 1.8–7.3)
NEUTROPHILS RELATIVE PERCENT: 58.6 % (ref 43–80)
PDW BLD-RTO: 11.9 FL (ref 11.5–15)
PLATELET # BLD: 145 E9/L (ref 130–450)
PMV BLD AUTO: 11.5 FL (ref 7–12)
POTASSIUM REFLEX MAGNESIUM: 3.5 MMOL/L (ref 3.5–5)
POTASSIUM SERPL-SCNC: 2.5 MMOL/L (ref 3.5–5)
RBC # BLD: 4.95 E12/L (ref 3.5–5.5)
REASON FOR REJECTION: NORMAL
REJECTED TEST: NORMAL
SODIUM BLD-SCNC: 136 MMOL/L (ref 132–146)
SODIUM BLD-SCNC: 136 MMOL/L (ref 132–146)
TOTAL PROTEIN: 8.1 G/DL (ref 6.4–8.3)
TSH SERPL DL<=0.05 MIU/L-ACNC: 1.43 UIU/ML (ref 0.27–4.2)
WBC # BLD: 5.2 E9/L (ref 4.5–11.5)

## 2022-08-16 PROCEDURE — 96366 THER/PROPH/DIAG IV INF ADDON: CPT

## 2022-08-16 PROCEDURE — 85025 COMPLETE CBC W/AUTO DIFF WBC: CPT

## 2022-08-16 PROCEDURE — 93005 ELECTROCARDIOGRAM TRACING: CPT

## 2022-08-16 PROCEDURE — 80048 BASIC METABOLIC PNL TOTAL CA: CPT

## 2022-08-16 PROCEDURE — 96368 THER/DIAG CONCURRENT INF: CPT

## 2022-08-16 PROCEDURE — 96375 TX/PRO/DX INJ NEW DRUG ADDON: CPT

## 2022-08-16 PROCEDURE — 99284 EMERGENCY DEPT VISIT MOD MDM: CPT

## 2022-08-16 PROCEDURE — 6360000002 HC RX W HCPCS

## 2022-08-16 PROCEDURE — 83735 ASSAY OF MAGNESIUM: CPT

## 2022-08-16 PROCEDURE — 80053 COMPREHEN METABOLIC PANEL: CPT

## 2022-08-16 PROCEDURE — 96365 THER/PROPH/DIAG IV INF INIT: CPT

## 2022-08-16 PROCEDURE — 36415 COLL VENOUS BLD VENIPUNCTURE: CPT

## 2022-08-16 PROCEDURE — 84443 ASSAY THYROID STIM HORMONE: CPT

## 2022-08-16 PROCEDURE — 6370000000 HC RX 637 (ALT 250 FOR IP)

## 2022-08-16 RX ORDER — MAGNESIUM SULFATE 1 G/100ML
1000 INJECTION INTRAVENOUS ONCE
Status: COMPLETED | OUTPATIENT
Start: 2022-08-16 | End: 2022-08-16

## 2022-08-16 RX ORDER — ONDANSETRON 2 MG/ML
4 INJECTION INTRAMUSCULAR; INTRAVENOUS ONCE
Status: COMPLETED | OUTPATIENT
Start: 2022-08-16 | End: 2022-08-16

## 2022-08-16 RX ORDER — POTASSIUM CHLORIDE 1.5 G/1.77G
40 POWDER, FOR SOLUTION ORAL ONCE
Status: DISCONTINUED | OUTPATIENT
Start: 2022-08-16 | End: 2022-08-16 | Stop reason: CLARIF

## 2022-08-16 RX ORDER — POTASSIUM CHLORIDE 7.45 MG/ML
10 INJECTION INTRAVENOUS ONCE
Status: COMPLETED | OUTPATIENT
Start: 2022-08-16 | End: 2022-08-16

## 2022-08-16 RX ADMIN — POTASSIUM BICARBONATE 40 MEQ: 782 TABLET, EFFERVESCENT ORAL at 14:10

## 2022-08-16 RX ADMIN — POTASSIUM CHLORIDE 10 MEQ: 7.46 INJECTION, SOLUTION INTRAVENOUS at 15:21

## 2022-08-16 RX ADMIN — ONDANSETRON 4 MG: 2 INJECTION INTRAMUSCULAR; INTRAVENOUS at 14:10

## 2022-08-16 RX ADMIN — MAGNESIUM SULFATE IN DEXTROSE 1000 MG: 10 INJECTION, SOLUTION INTRAVENOUS at 14:13

## 2022-08-16 ASSESSMENT — ENCOUNTER SYMPTOMS
BACK PAIN: 0
ABDOMINAL PAIN: 0
EYE DISCHARGE: 0
CHEST TIGHTNESS: 0
COLOR CHANGE: 0
ABDOMINAL DISTENTION: 0
APNEA: 0
EYE ITCHING: 0

## 2022-08-16 ASSESSMENT — PAIN - FUNCTIONAL ASSESSMENT: PAIN_FUNCTIONAL_ASSESSMENT: NONE - DENIES PAIN

## 2022-08-16 NOTE — ED NOTES
Presents to the ED with abnormal labs that were done this am.  Here for magnesium and potassium replacement.       Camila Calero RN  08/16/22 [id number]

## 2022-08-16 NOTE — TELEPHONE ENCOUNTER
Spoke to Dr. Kristopher Sandy and due to critical lab patient needs to go to ED. [facility] for patient to return call.

## 2022-08-16 NOTE — ED PROVIDER NOTES
Stephen Never 77 y.o. female PHMx of Essential HTN, Tobacco Use, Low Potasium presents to the ED c/o low potassium. Pt reports that her primary care physician sent her to the ER today for evaluation of her potassium. Patient reports that she was admitted to the hospital over a month ago for hypertensive emergency. She was put on hydrochlorothiazide and has had good blood pressure control since, however her potassium has dropped since being put on this medicine. Onset: Potassium was checked yesterday, sent to the hospital today. Location/Radiation: Denies any pain. Duration: This is ongoing chronic issue. Characterization: Low potassium. Aggravating Factors: Hydrochlorothiazide. Relieving Factors: Denies any. Severity: Denies. Patient reports she also takes potassium supplement, breaks in half and only takes 1/2/day. Assx Sxs: Low potassium. She Denies: Chest pain/shortness of breath, abdominal pain, numbness/tingling, diarrhea/constipation. Review of Systems   Constitutional:  Negative for appetite change and chills. HENT:  Negative for congestion and dental problem. Eyes:  Negative for discharge and itching. Respiratory:  Negative for apnea and chest tightness. Cardiovascular:  Negative for chest pain, palpitations and leg swelling. Gastrointestinal:  Negative for abdominal distention and abdominal pain. Endocrine: Negative for cold intolerance and heat intolerance. Genitourinary:  Negative for dysuria and flank pain. Musculoskeletal:  Negative for arthralgias and back pain. Skin:  Negative for color change and pallor. Neurological:  Negative for dizziness and facial asymmetry. Hematological:  Negative for adenopathy. Does not bruise/bleed easily. Psychiatric/Behavioral:  Negative for agitation and behavioral problems. Physical Exam  Constitutional:       General: She is not in acute distress. Appearance: Normal appearance. She is normal weight.  She is not toxic-appearing. HENT:      Head: Normocephalic and atraumatic. Right Ear: External ear normal.      Left Ear: External ear normal.      Nose: Nose normal.      Mouth/Throat:      Mouth: Mucous membranes are moist.      Pharynx: Oropharynx is clear. Eyes:      Extraocular Movements: Extraocular movements intact. Pupils: Pupils are equal, round, and reactive to light. Cardiovascular:      Rate and Rhythm: Normal rate and regular rhythm. Pulses: Normal pulses. Heart sounds: Normal heart sounds. Pulmonary:      Effort: Pulmonary effort is normal.      Breath sounds: Normal breath sounds. Abdominal:      General: Bowel sounds are normal. There is no distension. Tenderness: There is no abdominal tenderness. Musculoskeletal:         General: Normal range of motion. Cervical back: Normal range of motion. Skin:     General: Skin is warm and dry. Capillary Refill: Capillary refill takes less than 2 seconds. Findings: No bruising. Neurological:      General: No focal deficit present. Mental Status: She is alert and oriented to person, place, and time. Psychiatric:         Mood and Affect: Mood normal.         Behavior: Behavior normal.        Procedures     MDM  Number of Diagnoses or Management Options  Hypokalemia  Diagnosis management comments: Pt is a 68YO female with a history of essential hypertension that was sent to the ER by her primary care physician for potassium repletion and re-check. Her potassium this morning at her pcp's office was found to be 2.5. Pt stated that she is only taking half of a potassium supplement per day, and that her BP medicine is most likely causing the drop in her potassium. Upon evaluation in the er she was Aox4, cooperative, pleasant, neurovascularly intact, and hemodynamically stable all throughout her ER stay. Pt's daughter also accompanied the patient.  Pt was given 1 gram IV Mg, 10mEq of IV Potassium, and 40mEq of oral potassium. Her potassium was rechecked after repletion and found to be 3.5. Pt reported feeling well and wanting to be discharged as soon as possible so she could make it to work on time. She was medically clear for discharge home. All of her questions and concerns answered. Return precautions to the ER given. ED Course as of 08/17/22 1108   Tue Aug 16, 2022   1343 EKG Interpretation    Interpreted by emergency department physician    Rhythm: normal sinus   Rate: normal  Axis: normal  Ectopy: none  Conduction: normal  ST Segments: no acute change  T Waves: no acute change  Q Waves: none    Clinical Impression: no acute changes    Devora Rodriguez DO  [JR]   0801 Patient was given oral and IV potassium. Try to collect a potassium to recheck this level, however the specimen was hemolyzed. Collecting another specimen [JR]   [id number] will be taking over care of this patient. Waiting for final potassium check. Patient is AOx4, stable, cooperative, very pleasant. She reports she works night and wants to leave no matter what. [JR]   1833 Repeat BMP was within normal limits patient will be discharged and told to follow with her primary care provider regarding her symptoms. [JS]      ED Course User Index  [JR] Devora Rodriguez DO  [JS] Rica Kenney DO         ED Course as of 08/17/22 1108   Tue Aug 16, 2022   1343 EKG Interpretation    Interpreted by emergency department physician    Rhythm: normal sinus   Rate: normal  Axis: normal  Ectopy: none  Conduction: normal  ST Segments: no acute change  T Waves: no acute change  Q Waves: none    Clinical Impression: no acute changes    Devora Rodriguez DO  [JR]   2037 Patient was given oral and IV potassium. Try to collect a potassium to recheck this level, however the specimen was hemolyzed. Collecting another specimen [JR]   [id number] will be taking over care of this patient. Waiting for final potassium check.   Patient is AOx4, stable, cooperative, very pleasant. She reports she works night and wants to leave no matter what. [JR]   1833 Repeat BMP was within normal limits patient will be discharged and told to follow with her primary care provider regarding her symptoms. [JS]      ED Course User Index  [JR] Mauricio Larkin DO  [JS] Elena Morales DO       --------------------------------------------- PAST HISTORY ---------------------------------------------  Past Medical History:  has a past medical history of HTN (hypertension). Past Surgical History:  has a past surgical history that includes Cholecystectomy (2012); Partial hysterectomy (2001); Tonsillectomy; and Hemorrhoid surgery. Social History:  reports that she has been smoking cigarettes. She has a 1.25 pack-year smoking history. She has never used smokeless tobacco. She reports current alcohol use. She reports that she does not use drugs. Family History: family history includes COPD in her sister; Bo Soles in her father; No Known Problems in her brother, brother, daughter, daughter, daughter, maternal grandfather, maternal grandmother, mother, paternal grandfather, paternal grandmother, sister, son, and son; Other in her sister. The patients home medications have been reviewed.     Allergies: Morphine    -------------------------------------------------- RESULTS -------------------------------------------------  Labs:  Results for orders placed or performed during the hospital encounter of 08/16/22   SPECIMEN REJECTION   Result Value Ref Range    Rejected Test BMP, MG     Reason for Rejection see below    Basic Metabolic Panel w/ Reflex to MG   Result Value Ref Range    Sodium 136 132 - 146 mmol/L    Potassium reflex Magnesium 3.5 3.5 - 5.0 mmol/L    Chloride 99 98 - 107 mmol/L    CO2 24 22 - 29 mmol/L    Anion Gap 13 7 - 16 mmol/L    Glucose 99 74 - 99 mg/dL    BUN 30 (H) 6 - 23 mg/dL    Creatinine 1.4 (H) 0.5 - 1.0 mg/dL    GFR Non-African American 45 >=60 mL/min/1.73    GFR African American 45     Calcium 11.1 (H) 8.6 - 10.2 mg/dL   Magnesium   Result Value Ref Range    Magnesium 2.8 (H) 1.6 - 2.6 mg/dL   EKG 12 Lead   Result Value Ref Range    Ventricular Rate 70 BPM    Atrial Rate 70 BPM    P-R Interval 152 ms    QRS Duration 98 ms    Q-T Interval 432 ms    QTc Calculation (Bazett) 466 ms    P Axis 97 degrees    R Axis -29 degrees    T Axis 84 degrees       Radiology:  No orders to display       ------------------------- NURSING NOTES AND VITALS REVIEWED ---------------------------  Date / Time Roomed:  8/16/2022  1:19 PM  ED Bed Assignment:  [facility] [id number]    The nursing notes within the ED encounter and vital signs as below have been reviewed. BP (!) 139/92   Pulse 81   Temp 97.4 °F (36.3 °C)   Resp 16   Ht 5' 2\" (1.575 m)   Wt 119 lb (54 kg)   SpO2 98%   BMI 21.77 kg/m²   Oxygen Saturation Interpretation: Normal      ------------------------------------------ PROGRESS NOTES ------------------------------------------  11:00 AM EDT  I have spoken with the patient and discussed todays results, in addition to providing specific details for the plan of care and counseling regarding the diagnosis and prognosis. Their questions are answered at this time and they are agreeable with the plan. I discussed at length with them reasons for immediate return here for re evaluation. They will followup with their primary care physician by calling their office tomorrow. --------------------------------- ADDITIONAL PROVIDER NOTES ---------------------------------  At this time the patient is without objective evidence of an acute process requiring hospitalization or inpatient management. They have remained hemodynamically stable throughout their entire ED visit and are stable for discharge with outpatient follow-up. The plan has been discussed in detail and they are aware of the specific conditions for emergent return, as well as the importance of follow-up.       Discharge Medication List as of 8/16/2022  6:32 PM          Diagnosis:  1. Hypokalemia        Disposition:  Patient's disposition: Discharge to home  Patient's condition is stable.       Aydee Rosenberg DO  Resident  08/17/22 8188

## 2022-08-17 LAB
EKG ATRIAL RATE: 70 BPM
EKG P AXIS: 97 DEGREES
EKG P-R INTERVAL: 152 MS
EKG Q-T INTERVAL: 432 MS
EKG QRS DURATION: 98 MS
EKG QTC CALCULATION (BAZETT): 466 MS
EKG R AXIS: -29 DEGREES
EKG T AXIS: 84 DEGREES
EKG VENTRICULAR RATE: 70 BPM

## 2023-01-12 NOTE — CARE COORDINATION
9/12/19, SW met with patient at bedside. Patient was observed getting up on own and wanting to use bathroom. Discussed transition of care/SW role/discharge plan. Patient lives alone in a one story home with no steps. Patient has no DME and Pharmacy is [facility] on [address]. Patient will be own transportation since she drove self here and car is parked here at hospital.  Discharge plan is home with no needs once patient is medically cleared for discharge. SW to follow. Statement Selected

## 2023-05-02 ENCOUNTER — APPOINTMENT (OUTPATIENT)
Dept: GENERAL RADIOLOGY | Age: 68
End: 2023-05-02
Payer: MEDICARE

## 2023-05-02 LAB
ALBUMIN SERPL-MCNC: 4.4 G/DL (ref 3.5–5.2)
ALP SERPL-CCNC: 66 U/L (ref 35–104)
ALT SERPL-CCNC: 21 U/L (ref 0–32)
ANION GAP SERPL CALCULATED.3IONS-SCNC: 11 MMOL/L (ref 7–16)
AST SERPL-CCNC: 35 U/L (ref 0–31)
BASOPHILS # BLD: 0.02 E9/L (ref 0–0.2)
BASOPHILS NFR BLD: 0.4 % (ref 0–2)
BILIRUB SERPL-MCNC: 0.7 MG/DL (ref 0–1.2)
BNP BLD-MCNC: 114 PG/ML (ref 0–125)
BUN SERPL-MCNC: 13 MG/DL (ref 6–23)
CALCIUM SERPL-MCNC: 11.1 MG/DL (ref 8.6–10.2)
CHLORIDE SERPL-SCNC: 108 MMOL/L (ref 98–107)
CO2 SERPL-SCNC: 24 MMOL/L (ref 22–29)
CREAT SERPL-MCNC: 1.2 MG/DL (ref 0.5–1)
EOSINOPHIL # BLD: 0.08 E9/L (ref 0.05–0.5)
EOSINOPHIL NFR BLD: 1.5 % (ref 0–6)
ERYTHROCYTE [DISTWIDTH] IN BLOOD BY AUTOMATED COUNT: 12.9 FL (ref 11.5–15)
GLUCOSE SERPL-MCNC: 90 MG/DL (ref 74–99)
HCT VFR BLD AUTO: 44.7 % (ref 34–48)
HGB BLD-MCNC: 15.6 G/DL (ref 11.5–15.5)
IMM GRANULOCYTES # BLD: 0.04 E9/L
IMM GRANULOCYTES NFR BLD: 0.8 % (ref 0–5)
LYMPHOCYTES # BLD: 1.38 E9/L (ref 1.5–4)
LYMPHOCYTES NFR BLD: 26.3 % (ref 20–42)
MCH RBC QN AUTO: 33.5 PG (ref 26–35)
MCHC RBC AUTO-ENTMCNC: 34.9 % (ref 32–34.5)
MCV RBC AUTO: 95.9 FL (ref 80–99.9)
MONOCYTES # BLD: 0.58 E9/L (ref 0.1–0.95)
MONOCYTES NFR BLD: 11.1 % (ref 2–12)
NEUTROPHILS # BLD: 3.14 E9/L (ref 1.8–7.3)
NEUTS SEG NFR BLD: 59.9 % (ref 43–80)
PLATELET # BLD AUTO: 138 E9/L (ref 130–450)
PMV BLD AUTO: 10.5 FL (ref 7–12)
POTASSIUM SERPL-SCNC: 3.7 MMOL/L (ref 3.5–5)
PROT SERPL-MCNC: 7.2 G/DL (ref 6.4–8.3)
RBC # BLD AUTO: 4.66 E12/L (ref 3.5–5.5)
SODIUM SERPL-SCNC: 143 MMOL/L (ref 132–146)
TROPONIN, HIGH SENSITIVITY: 10 NG/L (ref 0–9)
TROPONIN, HIGH SENSITIVITY: 11 NG/L (ref 0–9)
WBC # BLD: 5.2 E9/L (ref 4.5–11.5)

## 2023-05-02 PROCEDURE — 99285 EMERGENCY DEPT VISIT HI MDM: CPT

## 2023-05-02 PROCEDURE — 71045 X-RAY EXAM CHEST 1 VIEW: CPT

## 2023-05-02 PROCEDURE — 93005 ELECTROCARDIOGRAM TRACING: CPT | Performed by: NURSE PRACTITIONER

## 2023-05-02 PROCEDURE — 83880 ASSAY OF NATRIURETIC PEPTIDE: CPT

## 2023-05-02 PROCEDURE — 80053 COMPREHEN METABOLIC PANEL: CPT

## 2023-05-02 PROCEDURE — 85025 COMPLETE CBC W/AUTO DIFF WBC: CPT

## 2023-05-02 PROCEDURE — 84484 ASSAY OF TROPONIN QUANT: CPT

## 2023-05-02 NOTE — ED NOTES
Department of Emergency Medicine  FIRST PROVIDER TRIAGE NOTE             Independent MLP           5/2/23  7:07 PM EDT    Date of Encounter: 5/2/23   MRN: [id number]      HPI: Nayan Mosquera is a 79 y.o. female who presents to the ED for Chest Pain (Intermittent, mid sternal cp with sob)       ROS: Negative for back pain or fever. PE: Gen Appearance/Constitutional: alert  HEENT: NC/NT. PERRLA,  Airway patent. Initial Plan of Care: All treatment areas with department are currently occupied. Plan to order/Initiate the following while awaiting opening in ED: labs, EKG, and imaging studies.   Initiate Treatment-Testing, Proceed toTreatment Area When Bed Available for ED Attending/MLP to Continue Care    Electronically signed by NINO Siegel CNP   DD: 5/2/23       NINO Siegel CNP  05/02/23 9843

## 2023-05-03 ENCOUNTER — HOSPITAL ENCOUNTER (OUTPATIENT)
Age: 68
Setting detail: OBSERVATION
Discharge: HOME | End: 2023-05-03
Attending: EMERGENCY MEDICINE | Admitting: FAMILY MEDICINE
Payer: MEDICARE

## 2023-05-03 ENCOUNTER — APPOINTMENT (OUTPATIENT)
Dept: CT IMAGING | Age: 68
End: 2023-05-03
Payer: MEDICARE

## 2023-05-03 ENCOUNTER — APPOINTMENT (OUTPATIENT)
Dept: NUCLEAR MEDICINE | Age: 68
End: 2023-05-03
Payer: MEDICARE

## 2023-05-03 ENCOUNTER — APPOINTMENT (OUTPATIENT)
Dept: NON INVASIVE DIAGNOSTICS | Age: 68
End: 2023-05-03
Payer: MEDICARE

## 2023-05-03 VITALS
WEIGHT: 113.6 LBS | RESPIRATION RATE: 16 BRPM | SYSTOLIC BLOOD PRESSURE: 144 MMHG | HEART RATE: 87 BPM | BODY MASS INDEX: 21.45 KG/M2 | TEMPERATURE: 97.9 F | HEIGHT: 61 IN | DIASTOLIC BLOOD PRESSURE: 84 MMHG | OXYGEN SATURATION: 100 %

## 2023-05-03 DIAGNOSIS — R07.9 CHEST PAIN, UNSPECIFIED TYPE: Primary | ICD-10-CM

## 2023-05-03 LAB
D DIMER: 375 NG/ML DDU
EKG ATRIAL RATE: 71 BPM
EKG ATRIAL RATE: 72 BPM
EKG P AXIS: 57 DEGREES
EKG P AXIS: 70 DEGREES
EKG P-R INTERVAL: 146 MS
EKG P-R INTERVAL: 146 MS
EKG Q-T INTERVAL: 406 MS
EKG Q-T INTERVAL: 416 MS
EKG QRS DURATION: 76 MS
EKG QRS DURATION: 90 MS
EKG QTC CALCULATION (BAZETT): 444 MS
EKG QTC CALCULATION (BAZETT): 452 MS
EKG R AXIS: -20 DEGREES
EKG R AXIS: -7 DEGREES
EKG T AXIS: 18 DEGREES
EKG T AXIS: 28 DEGREES
EKG VENTRICULAR RATE: 71 BPM
EKG VENTRICULAR RATE: 72 BPM
LV EF: 75 %
LVEF MODALITY: NORMAL
TROPONIN, HIGH SENSITIVITY: 8 NG/L (ref 0–9)

## 2023-05-03 PROCEDURE — G0378 HOSPITAL OBSERVATION PER HR: HCPCS

## 2023-05-03 PROCEDURE — 36415 COLL VENOUS BLD VENIPUNCTURE: CPT

## 2023-05-03 PROCEDURE — 71275 CT ANGIOGRAPHY CHEST: CPT

## 2023-05-03 PROCEDURE — 6360000002 HC RX W HCPCS: Performed by: FAMILY MEDICINE

## 2023-05-03 PROCEDURE — 78452 HT MUSCLE IMAGE SPECT MULT: CPT

## 2023-05-03 PROCEDURE — 84484 ASSAY OF TROPONIN QUANT: CPT

## 2023-05-03 PROCEDURE — 6360000002 HC RX W HCPCS: Performed by: INTERNAL MEDICINE

## 2023-05-03 PROCEDURE — A9500 TC99M SESTAMIBI: HCPCS | Performed by: RADIOLOGY

## 2023-05-03 PROCEDURE — 6370000000 HC RX 637 (ALT 250 FOR IP): Performed by: INTERNAL MEDICINE

## 2023-05-03 PROCEDURE — 96374 THER/PROPH/DIAG INJ IV PUSH: CPT

## 2023-05-03 PROCEDURE — 78452 HT MUSCLE IMAGE SPECT MULT: CPT | Performed by: INTERNAL MEDICINE

## 2023-05-03 PROCEDURE — 93016 CV STRESS TEST SUPVJ ONLY: CPT | Performed by: INTERNAL MEDICINE

## 2023-05-03 PROCEDURE — 6360000004 HC RX CONTRAST MEDICATION: Performed by: RADIOLOGY

## 2023-05-03 PROCEDURE — 93018 CV STRESS TEST I&R ONLY: CPT | Performed by: INTERNAL MEDICINE

## 2023-05-03 PROCEDURE — 93010 ELECTROCARDIOGRAM REPORT: CPT | Performed by: INTERNAL MEDICINE

## 2023-05-03 PROCEDURE — 85378 FIBRIN DEGRADE SEMIQUANT: CPT

## 2023-05-03 PROCEDURE — 6360000002 HC RX W HCPCS: Performed by: STUDENT IN AN ORGANIZED HEALTH CARE EDUCATION/TRAINING PROGRAM

## 2023-05-03 PROCEDURE — 93005 ELECTROCARDIOGRAM TRACING: CPT | Performed by: FAMILY MEDICINE

## 2023-05-03 PROCEDURE — 6370000000 HC RX 637 (ALT 250 FOR IP): Performed by: STUDENT IN AN ORGANIZED HEALTH CARE EDUCATION/TRAINING PROGRAM

## 2023-05-03 PROCEDURE — 96376 TX/PRO/DX INJ SAME DRUG ADON: CPT

## 2023-05-03 PROCEDURE — 6370000000 HC RX 637 (ALT 250 FOR IP): Performed by: FAMILY MEDICINE

## 2023-05-03 PROCEDURE — 3430000000 HC RX DIAGNOSTIC RADIOPHARMACEUTICAL: Performed by: RADIOLOGY

## 2023-05-03 PROCEDURE — 93017 CV STRESS TEST TRACING ONLY: CPT

## 2023-05-03 RX ORDER — ONDANSETRON 4 MG/1
4 TABLET, ORALLY DISINTEGRATING ORAL EVERY 8 HOURS PRN
Status: DISCONTINUED | OUTPATIENT
Start: 2023-05-03 | End: 2023-05-03 | Stop reason: HOSPADM

## 2023-05-03 RX ORDER — PANTOPRAZOLE SODIUM 40 MG/1
40 TABLET, DELAYED RELEASE ORAL
Status: DISCONTINUED | OUTPATIENT
Start: 2023-05-03 | End: 2023-05-03 | Stop reason: HOSPADM

## 2023-05-03 RX ORDER — ASPIRIN 81 MG/1
81 TABLET, CHEWABLE ORAL DAILY
Status: DISCONTINUED | OUTPATIENT
Start: 2023-05-04 | End: 2023-05-03 | Stop reason: HOSPADM

## 2023-05-03 RX ORDER — AMLODIPINE BESYLATE 10 MG/1
10 TABLET ORAL DAILY
Status: DISCONTINUED | OUTPATIENT
Start: 2023-05-03 | End: 2023-05-03 | Stop reason: HOSPADM

## 2023-05-03 RX ORDER — HYDROCHLOROTHIAZIDE 25 MG/1
50 TABLET ORAL DAILY
Status: DISCONTINUED | OUTPATIENT
Start: 2023-05-03 | End: 2023-05-03 | Stop reason: HOSPADM

## 2023-05-03 RX ORDER — TETRAKIS(2-METHOXYISOBUTYLISOCYANIDE)COPPER(I) TETRAFLUOROBORATE 1 MG/ML
10 INJECTION, POWDER, LYOPHILIZED, FOR SOLUTION INTRAVENOUS
Status: COMPLETED | OUTPATIENT
Start: 2023-05-03 | End: 2023-05-03

## 2023-05-03 RX ORDER — SODIUM CHLORIDE 0.9 % (FLUSH) 0.9 %
5-40 SYRINGE (ML) INJECTION PRN
Status: DISCONTINUED | OUTPATIENT
Start: 2023-05-03 | End: 2023-05-03 | Stop reason: HOSPADM

## 2023-05-03 RX ORDER — ASPIRIN 81 MG/1
324 TABLET, CHEWABLE ORAL ONCE
Status: COMPLETED | OUTPATIENT
Start: 2023-05-03 | End: 2023-05-03

## 2023-05-03 RX ORDER — DEXAMETHASONE 2 MG/1
2 TABLET ORAL DAILY
Qty: 5 TABLET | Refills: 0 | Status: SHIPPED | OUTPATIENT
Start: 2023-05-03 | End: 2023-05-08

## 2023-05-03 RX ORDER — LIDOCAINE 4 G/G
1 PATCH TOPICAL DAILY
Status: DISCONTINUED | OUTPATIENT
Start: 2023-05-03 | End: 2023-05-03 | Stop reason: HOSPADM

## 2023-05-03 RX ORDER — ONDANSETRON 2 MG/ML
4 INJECTION INTRAMUSCULAR; INTRAVENOUS EVERY 6 HOURS PRN
Status: DISCONTINUED | OUTPATIENT
Start: 2023-05-03 | End: 2023-05-03 | Stop reason: HOSPADM

## 2023-05-03 RX ORDER — TETRAKIS(2-METHOXYISOBUTYLISOCYANIDE)COPPER(I) TETRAFLUOROBORATE 1 MG/ML
30 INJECTION, POWDER, LYOPHILIZED, FOR SOLUTION INTRAVENOUS
Status: COMPLETED | OUTPATIENT
Start: 2023-05-03 | End: 2023-05-03

## 2023-05-03 RX ORDER — SODIUM CHLORIDE 9 MG/ML
INJECTION, SOLUTION INTRAVENOUS PRN
Status: DISCONTINUED | OUTPATIENT
Start: 2023-05-03 | End: 2023-05-03 | Stop reason: HOSPADM

## 2023-05-03 RX ORDER — ACETAMINOPHEN 650 MG/1
650 SUPPOSITORY RECTAL EVERY 6 HOURS PRN
Status: DISCONTINUED | OUTPATIENT
Start: 2023-05-03 | End: 2023-05-03 | Stop reason: HOSPADM

## 2023-05-03 RX ORDER — LIDOCAINE 4 G/G
1 PATCH TOPICAL DAILY
Qty: 10 PATCH | Refills: 0 | Status: SHIPPED | OUTPATIENT
Start: 2023-05-03

## 2023-05-03 RX ORDER — FENTANYL CITRATE 50 UG/ML
25 INJECTION, SOLUTION INTRAMUSCULAR; INTRAVENOUS ONCE
Status: COMPLETED | OUTPATIENT
Start: 2023-05-03 | End: 2023-05-03

## 2023-05-03 RX ORDER — POLYETHYLENE GLYCOL 3350 17 G/17G
17 POWDER, FOR SOLUTION ORAL DAILY PRN
Status: DISCONTINUED | OUTPATIENT
Start: 2023-05-03 | End: 2023-05-03 | Stop reason: HOSPADM

## 2023-05-03 RX ORDER — ATORVASTATIN CALCIUM 40 MG/1
40 TABLET, FILM COATED ORAL NIGHTLY
Status: DISCONTINUED | OUTPATIENT
Start: 2023-05-03 | End: 2023-05-03 | Stop reason: HOSPADM

## 2023-05-03 RX ORDER — OMEPRAZOLE 20 MG/1
20 CAPSULE, DELAYED RELEASE ORAL
Status: DISCONTINUED | OUTPATIENT
Start: 2023-05-03 | End: 2023-05-03 | Stop reason: CLARIF

## 2023-05-03 RX ORDER — FENTANYL CITRATE 50 UG/ML
50 INJECTION, SOLUTION INTRAMUSCULAR; INTRAVENOUS
Status: DISCONTINUED | OUTPATIENT
Start: 2023-05-03 | End: 2023-05-03 | Stop reason: HOSPADM

## 2023-05-03 RX ORDER — ENOXAPARIN SODIUM 100 MG/ML
40 INJECTION SUBCUTANEOUS DAILY
Status: DISCONTINUED | OUTPATIENT
Start: 2023-05-03 | End: 2023-05-03 | Stop reason: HOSPADM

## 2023-05-03 RX ORDER — DEXAMETHASONE 1 MG
2 TABLET ORAL DAILY
Status: DISCONTINUED | OUTPATIENT
Start: 2023-05-03 | End: 2023-05-03 | Stop reason: HOSPADM

## 2023-05-03 RX ORDER — ACETAMINOPHEN 325 MG/1
650 TABLET ORAL EVERY 6 HOURS PRN
Status: DISCONTINUED | OUTPATIENT
Start: 2023-05-03 | End: 2023-05-03 | Stop reason: HOSPADM

## 2023-05-03 RX ORDER — SODIUM CHLORIDE 0.9 % (FLUSH) 0.9 %
5-40 SYRINGE (ML) INJECTION EVERY 12 HOURS SCHEDULED
Status: DISCONTINUED | OUTPATIENT
Start: 2023-05-03 | End: 2023-05-03 | Stop reason: HOSPADM

## 2023-05-03 RX ORDER — LOSARTAN POTASSIUM 50 MG/1
100 TABLET ORAL DAILY
Status: DISCONTINUED | OUTPATIENT
Start: 2023-05-03 | End: 2023-05-03 | Stop reason: HOSPADM

## 2023-05-03 RX ORDER — POTASSIUM CHLORIDE 750 MG/1
10 TABLET, EXTENDED RELEASE ORAL
Status: DISCONTINUED | OUTPATIENT
Start: 2023-05-03 | End: 2023-05-03 | Stop reason: HOSPADM

## 2023-05-03 RX ORDER — ATORVASTATIN CALCIUM 40 MG/1
40 TABLET, FILM COATED ORAL NIGHTLY
Qty: 30 TABLET | Refills: 3 | Status: SHIPPED | OUTPATIENT
Start: 2023-05-03

## 2023-05-03 RX ADMIN — REGADENOSON 0.4 MG: 0.08 INJECTION, SOLUTION INTRAVENOUS at 10:24

## 2023-05-03 RX ADMIN — LOSARTAN POTASSIUM 100 MG: 50 TABLET, FILM COATED ORAL at 12:16

## 2023-05-03 RX ADMIN — ASPIRIN 81 MG 324 MG: 81 TABLET ORAL at 01:33

## 2023-05-03 RX ADMIN — AMLODIPINE BESYLATE 10 MG: 10 TABLET ORAL at 12:17

## 2023-05-03 RX ADMIN — Medication 35 MILLICURIE: at 10:25

## 2023-05-03 RX ADMIN — IOPAMIDOL 70 ML: 755 INJECTION, SOLUTION INTRAVENOUS at 06:10

## 2023-05-03 RX ADMIN — DEXAMETHASONE 2 MG: 1 TABLET ORAL at 14:08

## 2023-05-03 RX ADMIN — POTASSIUM CHLORIDE 10 MEQ: 750 TABLET, EXTENDED RELEASE ORAL at 12:17

## 2023-05-03 RX ADMIN — Medication 11 MILLICURIE: at 08:50

## 2023-05-03 RX ADMIN — FENTANYL CITRATE 50 MCG: 50 INJECTION, SOLUTION INTRAMUSCULAR; INTRAVENOUS at 05:07

## 2023-05-03 RX ADMIN — FENTANYL CITRATE 25 MCG: 50 INJECTION, SOLUTION INTRAMUSCULAR; INTRAVENOUS at 02:13

## 2023-05-03 RX ADMIN — HYDROCHLOROTHIAZIDE 50 MG: 25 TABLET ORAL at 12:17

## 2023-05-03 RX ADMIN — FENTANYL CITRATE 50 MCG: 50 INJECTION, SOLUTION INTRAMUSCULAR; INTRAVENOUS at 12:15

## 2023-05-03 RX ADMIN — PANTOPRAZOLE SODIUM 40 MG: 40 TABLET, DELAYED RELEASE ORAL at 06:49

## 2023-05-03 RX ADMIN — FENTANYL CITRATE 50 MCG: 50 INJECTION, SOLUTION INTRAMUSCULAR; INTRAVENOUS at 07:10

## 2023-05-03 ASSESSMENT — PAIN DESCRIPTION - LOCATION
LOCATION: CHEST

## 2023-05-03 ASSESSMENT — PAIN - FUNCTIONAL ASSESSMENT
PAIN_FUNCTIONAL_ASSESSMENT: 0-10
PAIN_FUNCTIONAL_ASSESSMENT: PREVENTS OR INTERFERES SOME ACTIVE ACTIVITIES AND ADLS

## 2023-05-03 ASSESSMENT — PAIN DESCRIPTION - ORIENTATION
ORIENTATION: MID

## 2023-05-03 ASSESSMENT — PAIN DESCRIPTION - DESCRIPTORS
DESCRIPTORS: PRESSURE
DESCRIPTORS: ACHING
DESCRIPTORS: ACHING;DISCOMFORT;DULL
DESCRIPTORS: SHARP
DESCRIPTORS: PRESSURE

## 2023-05-03 ASSESSMENT — PAIN SCALES - GENERAL
PAINLEVEL_OUTOF10: 7
PAINLEVEL_OUTOF10: 7
PAINLEVEL_OUTOF10: 6
PAINLEVEL_OUTOF10: 7
PAINLEVEL_OUTOF10: 8

## 2023-05-03 NOTE — PROGRESS NOTES
CLINICAL PHARMACY NOTE: MEDS TO BEDS    Total # of Prescriptions Filled: 3   The following medications were delivered to the patient:  Dexamethasone 2  Asperflex 4% patch  Atorvastatin 40    Additional Documentation:

## 2023-05-03 NOTE — CARE COORDINATION
5/3/23  Spoke with patient regarding transition of care. Discharge order noted. Patient admitted for chest pain. Patients chest X-ray and EKG are unremarkable. Patient had a stress test today with  nuclear images pending. Patient is from home and independent. Patient has no active HH or JANE history. Patient does not use any DME. Discharge goal is to return home with no needs. PCP is \"Dr. Quyen Oliva" who is a NP at [facility] per patient and pharmacy preference is [facility]. Patient states one of her children will be here to provide transport home when discharged. SW/Cm to follow. Electronically signed by ALFONZO Abraham on 5/3/2023 at 12:53 PM     Case Management Assessment  Initial Evaluation    Date/Time of Evaluation: 5/3/2023 12:56 PM  Assessment Completed by: ALFONZO Abraham    If patient is discharged prior to next notation, then this note serves as note for discharge by case management. Patient Name: Cheyenne Dancer                   YOB: 1955  Diagnosis: Chest pain in adult [R07.9]  Chest pain, unspecified type [R07.9]                   Date / Time: 5/3/2023  1:12 AM    Patient Admission Status: Observation   Readmission Risk (Low < 19, Mod (19-27), High > 27): Readmission Risk Score: 7.4    Current PCP: Nu Easton, DO  PCP verified by CM? Yes    Chart Reviewed: Yes      History Provided by: Patient  Patient Orientation: Alert and Oriented, Person, Place, Situation    Patient Cognition: Alert    Hospitalization in the last 30 days (Readmission):  No    If yes, Readmission Assessment in  Navigator will be completed.     Advance Directives:      Code Status: Full Code   Patient's Primary Decision Maker is:      Primary Decision MakerAndagoberto [place] - Child - [phone]    Discharge Planning:    Patient lives with: Alone Type of Home: House  Primary Care Giver: Self  Patient Support Systems include: Children   Current Financial resources:    Current

## 2023-05-03 NOTE — PLAN OF CARE
Problem: Discharge Planning  Goal: Discharge to home or other facility with appropriate resources  5/3/2023 1704 by Nain Jones RN  Outcome: Adequate for Discharge  5/3/2023 0546 by Kiersten Covington RN  Outcome: Progressing     Problem: Pain  Goal: Verbalizes/displays adequate comfort level or baseline comfort level  5/3/2023 1704 by Nain Jones RN  Outcome: Adequate for Discharge  5/3/2023 0546 by Kiersten Covington RN  Outcome: Progressing     Problem: Safety - Adult  Goal: Free from fall injury  5/3/2023 1704 by Nain Jones RN  Outcome: Adequate for Discharge  5/3/2023 0546 by Kiersten Covington RN  Outcome: Progressing     Problem: ABCDS Injury Assessment  Goal: Absence of physical injury  5/3/2023 1704 by Nain Jones RN  Outcome: Adequate for Discharge  5/3/2023 0546 by Kiersten Covington RN  Outcome: Progressing

## 2023-05-03 NOTE — DISCHARGE SUMMARY
PM     05/02/2023 07:26 PM    CO2 24 05/02/2023 07:26 PM    BUN 13 05/02/2023 07:26 PM    CREATININE 1.2 05/02/2023 07:26 PM    CALCIUM 11.1 05/02/2023 07:26 PM    PHOS 3.1 07/31/2022 05:35 AM       Discharge Medications:     Current Discharge Medication List             Details   atorvastatin (LIPITOR) 40 MG tablet Take 1 tablet by mouth nightly  Qty: 30 tablet, Refills: 3      dexamethasone (DECADRON) 2 MG tablet Take 1 tablet by mouth daily for 5 doses  Qty: 5 tablet, Refills: 0      lidocaine 4 % external patch Place 1 patch onto the skin daily  Qty: 10 patch, Refills: 0                Details   omeprazole (PRILOSEC) 20 MG delayed release capsule Take 1 capsule by mouth every morning (before breakfast)  Qty: 30 capsule, Refills: 5    Associated Diagnoses: Gastroesophageal reflux disease, unspecified whether esophagitis present      losartan (COZAAR) 100 MG tablet Take 1 tablet by mouth in the morning. Qty: 30 tablet, Refills: 5    Associated Diagnoses: Uncontrolled hypertension      amLODIPine (NORVASC) 10 MG tablet Take 1 tablet by mouth in the morning. Qty: 30 tablet, Refills: 3             Time Spent on discharge is more than 30 minutes in the examination, evaluation, counseling and review of medications and discharge plan.       Signed:    Korey Whittaker MD   5/3/2023

## 2023-05-03 NOTE — ED PROVIDER NOTES
[address]        Pt Name: Sara Gu  MRN: [id number]  [place] 1955  Date of evaluation: 5/2/2023  Provider: Jac Saul DO  PCP: America Salgado DO  Note Started: 1:28 AM EDT 5/3/23    CHIEF COMPLAINT       Chief Complaint   Patient presents with    Chest Pain     Intermittent, mid sternal cp with sob       HISTORY OF PRESENT ILLNESS: 1 or more Elements   History From: Patient    Limitations to history : None    Sara Gu is a 79 y.o. female with past medical history of malnutrition, hypertension and chronic hepatitis C who presents emergency department due to worsening chest pain. Patient states that her symptoms started acutely this morning around 7 AM when she got off of work. She denies any inciting factors for the symptoms. Patient has had no trauma or injury to the chest where her pain is present. She localizes the pain to her mid to left chest.  The pain is sharp, constant and nonradiating. Nothing in particular makes the pain better or worse. It is moderate in severity. Patient does state that she has intermittent shortness of breath and palpitations with her symptoms. Shortness of breath is not particularly worse with exertion. She has no other symptoms including nausea, vomiting, diaphoresis, fever, chills, cough, congestion, sore throat, back pain, neck pain, numbness, tingling, abdominal pain, urinary symptoms, constipation or diarrhea. On initial assessment, patient does look in moderate acute distress secondary to her symptoms. No medications taken prior to arrival to the ED. Patient denying any recent sick contacts or illnesses.     Nursing Notes were all reviewed and agreed with or any disagreements were addressed in the HPI.    ROS:   Pertinent positives and negatives are stated within HPI, all other systems reviewed and are

## 2023-05-04 ENCOUNTER — TELEPHONE (OUTPATIENT)
Dept: FAMILY MEDICINE CLINIC | Age: 68
End: 2023-05-04

## 2023-05-04 NOTE — TELEPHONE ENCOUNTER
Care Transitions Initial Follow Up Call    Outreach made within 2 business days of discharge: Yes    Patient: Shannan Matt Patient : 1955   MRN: [id number]  Reason for Admission: There are no discharge diagnoses documented for the most recent discharge.   Discharge Date: 5/3/23       Spoke with: none    Discharge department/facility: [address] [place] [address]

## 2023-05-05 ENCOUNTER — APPOINTMENT (OUTPATIENT)
Dept: GENERAL RADIOLOGY | Age: 68
End: 2023-05-05
Payer: MEDICARE

## 2023-05-05 ENCOUNTER — TELEPHONE (OUTPATIENT)
Dept: FAMILY MEDICINE CLINIC | Age: 68
End: 2023-05-05

## 2023-05-05 ENCOUNTER — HOSPITAL ENCOUNTER (EMERGENCY)
Age: 68
Discharge: HOME | End: 2023-05-05
Attending: EMERGENCY MEDICINE
Payer: MEDICARE

## 2023-05-05 VITALS
HEART RATE: 83 BPM | OXYGEN SATURATION: 98 % | HEIGHT: 61 IN | TEMPERATURE: 98.5 F | RESPIRATION RATE: 18 BRPM | WEIGHT: 113 LBS | BODY MASS INDEX: 21.34 KG/M2 | DIASTOLIC BLOOD PRESSURE: 83 MMHG | SYSTOLIC BLOOD PRESSURE: 177 MMHG

## 2023-05-05 DIAGNOSIS — R07.89 CHEST WALL PAIN: Primary | ICD-10-CM

## 2023-05-05 DIAGNOSIS — E87.6 HYPOKALEMIA: ICD-10-CM

## 2023-05-05 DIAGNOSIS — E83.52 HYPERCALCEMIA: ICD-10-CM

## 2023-05-05 LAB
ANION GAP SERPL CALCULATED.3IONS-SCNC: 12 MMOL/L (ref 7–16)
BASOPHILS # BLD: 0.01 E9/L (ref 0–0.2)
BASOPHILS NFR BLD: 0.1 % (ref 0–2)
BUN SERPL-MCNC: 24 MG/DL (ref 6–23)
CALCIUM SERPL-MCNC: 12 MG/DL (ref 8.6–10.2)
CHLORIDE SERPL-SCNC: 103 MMOL/L (ref 98–107)
CO2 SERPL-SCNC: 25 MMOL/L (ref 22–29)
CREAT SERPL-MCNC: 1 MG/DL (ref 0.5–1)
EKG ATRIAL RATE: 83 BPM
EKG P AXIS: 69 DEGREES
EKG P-R INTERVAL: 136 MS
EKG Q-T INTERVAL: 368 MS
EKG QRS DURATION: 86 MS
EKG QTC CALCULATION (BAZETT): 432 MS
EKG R AXIS: 36 DEGREES
EKG T AXIS: 64 DEGREES
EKG VENTRICULAR RATE: 83 BPM
EOSINOPHIL # BLD: 0 E9/L (ref 0.05–0.5)
EOSINOPHIL NFR BLD: 0 % (ref 0–6)
ERYTHROCYTE [DISTWIDTH] IN BLOOD BY AUTOMATED COUNT: 12.9 FL (ref 11.5–15)
GLUCOSE SERPL-MCNC: 85 MG/DL (ref 74–99)
HCT VFR BLD AUTO: 42.9 % (ref 34–48)
HGB BLD-MCNC: 15 G/DL (ref 11.5–15.5)
IMM GRANULOCYTES # BLD: 0.09 E9/L
IMM GRANULOCYTES NFR BLD: 0.9 % (ref 0–5)
LYMPHOCYTES # BLD: 1.31 E9/L (ref 1.5–4)
LYMPHOCYTES NFR BLD: 12.6 % (ref 20–42)
MCH RBC QN AUTO: 33.5 PG (ref 26–35)
MCHC RBC AUTO-ENTMCNC: 35 % (ref 32–34.5)
MCV RBC AUTO: 95.8 FL (ref 80–99.9)
MONOCYTES # BLD: 0.87 E9/L (ref 0.1–0.95)
MONOCYTES NFR BLD: 8.4 % (ref 2–12)
NEUTROPHILS # BLD: 8.09 E9/L (ref 1.8–7.3)
NEUTS SEG NFR BLD: 78 % (ref 43–80)
PLATELET # BLD AUTO: 149 E9/L (ref 130–450)
PMV BLD AUTO: 10.3 FL (ref 7–12)
POTASSIUM SERPL-SCNC: 3.4 MMOL/L (ref 3.5–5)
RBC # BLD AUTO: 4.48 E12/L (ref 3.5–5.5)
SODIUM SERPL-SCNC: 140 MMOL/L (ref 132–146)
TROPONIN, HIGH SENSITIVITY: 10 NG/L (ref 0–9)
TROPONIN, HIGH SENSITIVITY: 9 NG/L (ref 0–9)
WBC # BLD: 10.4 E9/L (ref 4.5–11.5)

## 2023-05-05 PROCEDURE — 6370000000 HC RX 637 (ALT 250 FOR IP): Performed by: STUDENT IN AN ORGANIZED HEALTH CARE EDUCATION/TRAINING PROGRAM

## 2023-05-05 PROCEDURE — 93005 ELECTROCARDIOGRAM TRACING: CPT

## 2023-05-05 PROCEDURE — 93010 ELECTROCARDIOGRAM REPORT: CPT | Performed by: INTERNAL MEDICINE

## 2023-05-05 PROCEDURE — 85025 COMPLETE CBC W/AUTO DIFF WBC: CPT

## 2023-05-05 PROCEDURE — 80048 BASIC METABOLIC PNL TOTAL CA: CPT

## 2023-05-05 PROCEDURE — 84484 ASSAY OF TROPONIN QUANT: CPT

## 2023-05-05 PROCEDURE — 6360000002 HC RX W HCPCS: Performed by: STUDENT IN AN ORGANIZED HEALTH CARE EDUCATION/TRAINING PROGRAM

## 2023-05-05 PROCEDURE — 96374 THER/PROPH/DIAG INJ IV PUSH: CPT

## 2023-05-05 PROCEDURE — 71045 X-RAY EXAM CHEST 1 VIEW: CPT

## 2023-05-05 PROCEDURE — 99285 EMERGENCY DEPT VISIT HI MDM: CPT

## 2023-05-05 RX ORDER — LIDOCAINE 4 G/G
1 PATCH TOPICAL ONCE
Status: DISCONTINUED | OUTPATIENT
Start: 2023-05-05 | End: 2023-05-05 | Stop reason: HOSPADM

## 2023-05-05 RX ORDER — CYCLOBENZAPRINE HCL 10 MG
10 TABLET ORAL 3 TIMES DAILY PRN
Qty: 21 TABLET | Refills: 0 | Status: SHIPPED | OUTPATIENT
Start: 2023-05-05 | End: 2023-05-15

## 2023-05-05 RX ORDER — HYDROCODONE BITARTRATE AND ACETAMINOPHEN 5; 325 MG/1; MG/1
1 TABLET ORAL ONCE
Status: COMPLETED | OUTPATIENT
Start: 2023-05-05 | End: 2023-05-05

## 2023-05-05 RX ORDER — LIDOCAINE 50 MG/G
1 PATCH TOPICAL EVERY 24 HOURS
Qty: 10 PATCH | Refills: 0 | Status: SHIPPED | OUTPATIENT
Start: 2023-05-05 | End: 2023-05-15

## 2023-05-05 RX ORDER — KETOROLAC TROMETHAMINE 30 MG/ML
15 INJECTION, SOLUTION INTRAMUSCULAR; INTRAVENOUS ONCE
Status: COMPLETED | OUTPATIENT
Start: 2023-05-05 | End: 2023-05-05

## 2023-05-05 RX ORDER — CYCLOBENZAPRINE HCL 10 MG
10 TABLET ORAL ONCE
Status: COMPLETED | OUTPATIENT
Start: 2023-05-05 | End: 2023-05-05

## 2023-05-05 RX ORDER — NAPROXEN 250 MG/1
250 TABLET ORAL 2 TIMES DAILY PRN
Qty: 14 TABLET | Refills: 0 | Status: SHIPPED | OUTPATIENT
Start: 2023-05-05 | End: 2023-05-12

## 2023-05-05 RX ORDER — POTASSIUM CHLORIDE 20 MEQ/1
40 TABLET, EXTENDED RELEASE ORAL ONCE
Status: COMPLETED | OUTPATIENT
Start: 2023-05-05 | End: 2023-05-05

## 2023-05-05 RX ADMIN — POTASSIUM CHLORIDE 40 MEQ: 1500 TABLET, EXTENDED RELEASE ORAL at 04:43

## 2023-05-05 RX ADMIN — CYCLOBENZAPRINE 10 MG: 10 TABLET, FILM COATED ORAL at 04:21

## 2023-05-05 RX ADMIN — HYDROCODONE BITARTRATE AND ACETAMINOPHEN 1 TABLET: 5; 325 TABLET ORAL at 05:28

## 2023-05-05 RX ADMIN — KETOROLAC TROMETHAMINE 15 MG: 30 INJECTION, SOLUTION INTRAMUSCULAR; INTRAVENOUS at 04:17

## 2023-05-05 ASSESSMENT — LIFESTYLE VARIABLES
HOW OFTEN DO YOU HAVE A DRINK CONTAINING ALCOHOL: MONTHLY OR LESS
HOW MANY STANDARD DRINKS CONTAINING ALCOHOL DO YOU HAVE ON A TYPICAL DAY: 1 OR 2

## 2023-05-05 ASSESSMENT — PAIN DESCRIPTION - LOCATION: LOCATION: CHEST

## 2023-05-05 ASSESSMENT — PAIN SCALES - GENERAL: PAINLEVEL_OUTOF10: 10

## 2023-05-05 ASSESSMENT — PAIN DESCRIPTION - DESCRIPTORS: DESCRIPTORS: STABBING

## 2023-05-05 ASSESSMENT — PAIN DESCRIPTION - ORIENTATION: ORIENTATION: RIGHT

## 2023-05-05 ASSESSMENT — PAIN - FUNCTIONAL ASSESSMENT: PAIN_FUNCTIONAL_ASSESSMENT: 0-10

## 2023-05-05 NOTE — ED NOTES
The following labs were labeled with appropriate pt sticker and tubed to lab:     [] Blue     [] Lavender   [] on ice  [x] Green/yellow  [] Green/black [] on ice  [] Euell [place]  [] on ice  [] Yellow  [] Red  [] Type/ Screen  [] ABG  [] VBG    [] COVID-19 swab    [] Rapid  [] PCR  [] Flu swab  [] Peds Viral Panel     [] Urine Sample  [] Fecal Sample  [] Pelvic Cultures  [] Blood Cultures  [] X 2  [] STREP Cultures       Caleb Haider, XANDER  05/05/23 9426

## 2023-05-05 NOTE — ED NOTES
The following labs were labeled with appropriate pt sticker and tubed to lab:     [] Blue     [x] Lavender   [] on ice  [x] Green/yellow  [] Green/black [] on ice  [] Euell [place]  [] on ice  [] Yellow  [] Red  [] Type/ Screen  [] ABG  [] VBG    [] COVID-19 swab    [] Rapid  [] PCR  [] Flu swab  [] Peds Viral Panel     [] Urine Sample  [] Fecal Sample  [] Pelvic Cultures  [] Blood Cultures  [] X 2  [] STREP Cultures       Caleb Haider RN  05/05/23 9670

## 2023-05-05 NOTE — ED PROVIDER NOTES
[address]        Pt Name: Fernando Forbes  MRN: [id number]  [place] 1955  Date of evaluation: 5/5/2023  Provider: Otilio Hernandez DO  PCP: Zainab Zavaleta DO  Note Started: 3:25 AM EDT 5/5/23    CHIEF COMPLAINT       Chief Complaint   Patient presents with    Chest Pain     Right sided stabbing chest pain. Does not radiate. Was just admitted/had stress test yesterday. HISTORY OF PRESENT ILLNESS: 1 or more Elements            Fernando Forbes is a 79 y.o. female with history of hypertension, tobacco use, who presents for right-sided chest pain. 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 No radiation to the jaw or arm. No numbness or weakness or tingling. Mildly short of breath, pain has been constant, worse with palpation of that area. No medications taken prior to arrival.  No diaphoresis or nausea or vomiting or lightheadedness or syncope. Patient recently admitted and had negative stress test.      REVIEW OF EXTERNAL NOTE :       see ED Course    REVIEW OF SYSTEMS :      Pertinent positives and negatives as per HPI.      SURGICAL HISTORY     Past Surgical History:   Procedure Laterality Date    CHOLECYSTECTOMY  2012    HEMORRHOID SURGERY      PARTIAL HYSTERECTOMY (CERVIX NOT REMOVED)  2001    TONSILLECTOMY      patient was 11years old       [address]       Discharge Medication List as of 5/5/2023  5:31 AM        CONTINUE these medications which have NOT CHANGED    Details   atorvastatin (LIPITOR) 40 MG tablet Take 1 tablet by mouth nightly, Disp-30 tablet, R-3Normal      dexamethasone (DECADRON) 2 MG tablet Take 1 tablet by mouth daily for 5 doses, Disp-5 tablet, R-0Normal

## 2023-06-05 ENCOUNTER — TELEPHONE (OUTPATIENT)
Dept: PHARMACY | Facility: CLINIC | Age: 68
End: 2023-06-05

## 2023-06-05 NOTE — TELEPHONE ENCOUNTER
[facility] CLINICAL PHARMACY: ADHERENCE REVIEW  Identified care gap per [place]: fills at [facility]: Diabetes and Statin adherence    Patient also appears to be prescribed: Diabetes and Statin    ASSESSMENT  DIABETES ADHERENCE    Insurance Records claims through 23 (Prior Year [place] = not reported; YTD Fernando Pelayo = 84%; Potential Fail Date: 23):   LOSARTAN POT TAB 100MG last filled on 23 for 30 day supply. Next refill due: 23    Prescribed si tablet/capsule daily    Per Reconcile Dispense History: last filled on 23 for 30 day supply. Per [facility]: last picked up on 23 for 30 day supply. Lab Results   Component Value Date    LABA1C 4.5 2021    LABA1C 4.3 2021     NOTE: A1c <9%    STATIN ADHERENCE    Insurance Records claims through 23 (Prior Year [place] = not reported; YTD [facility] = FIRST FILL; Potential Fail Date: 23):   ATORVASTATIN 40 MG TABLET last filled on 23 for 30 day supply. Next refill due: 23    Prescribed sig:  nightly    Per Reconcile Dispense History: last filled on 23 for 30 day supply.      Per [facility]: not on file    Lab Results   Component Value Date    CHOL 152 2021    TRIG 120 2021    HDL 67 2021    LDLCALC 61 2021     ALT   Date Value Ref Range Status   2023 21 0 - 32 U/L Final     AST   Date Value Ref Range Status   2023 35 (H) 0 - 31 U/L Final     The 10-year ASCVD risk score (Jackeline BAUMANN, et al., 2019) is: 28.6%    Values used to calculate the score:      Age: 79 years      Sex: Female      Is Non- : Yes      Diabetic: No      Tobacco smoker: Yes      Systolic Blood Pressure: 228 mmHg      Is BP treated: Yes      HDL Cholesterol: 67 mg/dL      Total Cholesterol: 152 mg/dL     PLAN  The following are interventions that have been identified:   Patient overdue refilling Atorvastatin 40mg and Losartan 100mg and active on home medication list. Manual Repair Warning Statement: We plan on removing the manually selected variable below in favor of our much easier automatic structured text blocks found in the previous tab. We decided to do this to help make the flow better and give you the full power of structured data. Manual selection is never going to be ideal in our platform and I would encourage you to avoid using manual selection from this point on, especially since I will be sunsetting this feature. It is important that you do one of two things with the customized text below. First, you can save all of the text in a word file so you can have it for future reference. Second, transfer the text to the appropriate area in the Library tab. Lastly, if there is a flap or graft type which we do not have you need to let us know right away so I can add it in before the variable is hidden. No need to panic, we plan to give you roughly 6 months to make the change.

## 2023-07-14 ENCOUNTER — TELEPHONE (OUTPATIENT)
Dept: PHARMACY | Facility: CLINIC | Age: 68
End: 2023-07-14

## 2023-07-14 NOTE — TELEPHONE ENCOUNTER
[facility] [place] CLINICAL PHARMACY: ADHERENCE REVIEW  Identified care gap per [place]: fills at [facility]: Statin adherence    Patient also appears to be prescribed: Diabetes    ASSESSMENT  DIABETES ADHERENCE    Insurance Records claims through 23 (Prior Year [address] = not reported; YTD [address] [place] [address] Street = 85%; Potential Fail Date: 9/10/23): Losartan 100mg last filled on 23 for 30 day supply. Next refill due: 23    RF available    Lab Results   Component Value Date    LABA1C 4.5 2021    LABA1C 4.3 2021     NOTE: A1c not yet completed this calendar year     [address] Records claims through 23 (Prior Year 2 [address] = not reported; YTD [address] = FIRST FILL; Potential Fail Date: 23): Atorvastatin 40mg last filled on 5/3/23 for 30 day supply. Next refill due: 23    Prescribed si tablet/capsule daily    First fill-pharmacy not contacted at this time. Lab Results   Component Value Date    CHOL 152 2021    TRIG 120 2021    HDL 67 2021    LDLCALC 61 2021     ALT   Date Value Ref Range Status   2023 21 0 - 32 U/L Final     AST   Date Value Ref Range Status   2023 35 (H) 0 - 31 U/L Final     The 10-year ASCVD risk score (Jackeline DK, et al., 2019) is: 28.6%    Values used to calculate the score:      Age: 79 years      Sex: Female      Is Non- : Yes      Diabetic: No      Tobacco smoker: Yes      Systolic Blood Pressure: 379 mmHg      Is BP treated: Yes      HDL Cholesterol: 67 mg/dL      Total Cholesterol: 152 mg/dL       The following are interventions that have been identified:   Patient overdue refilling Atorvastatin 40mg and active on home medication list.   Patient eligible for 100 day supply of both    Attempting to reach patient to review. Left message asking for return call. Letter sent to patient. Last Visit: 8/15/22  Next Visit: none      Zhanna Guzman [facility].     [facility]

## 2023-10-23 ENCOUNTER — HOSPITAL ENCOUNTER (OUTPATIENT)
Age: 68
Discharge: HOME | End: 2023-10-25

## 2023-10-23 PROCEDURE — 88305 TISSUE EXAM BY PATHOLOGIST: CPT

## 2023-10-26 LAB — SURGICAL PATHOLOGY REPORT: NORMAL

## 2024-05-03 ENCOUNTER — TELEPHONE (OUTPATIENT)
Dept: FAMILY MEDICINE CLINIC | Age: 69
End: 2024-05-03

## 2024-05-03 NOTE — TELEPHONE ENCOUNTER
Tried to contact pt to schedule an appt since she has not been seen since 2022. LVM for return call to schedule.

## 2024-05-31 ENCOUNTER — HOSPITAL ENCOUNTER (EMERGENCY)
Age: 69
Discharge: HOME | End: 2024-05-31
Attending: EMERGENCY MEDICINE
Payer: MEDICARE

## 2024-05-31 ENCOUNTER — APPOINTMENT (OUTPATIENT)
Dept: CT IMAGING | Age: 69
End: 2024-05-31
Payer: MEDICARE

## 2024-05-31 VITALS
RESPIRATION RATE: 16 BRPM | BODY MASS INDEX: 21.16 KG/M2 | HEIGHT: 62 IN | HEART RATE: 82 BPM | TEMPERATURE: 98.6 F | SYSTOLIC BLOOD PRESSURE: 148 MMHG | DIASTOLIC BLOOD PRESSURE: 90 MMHG | OXYGEN SATURATION: 99 % | WEIGHT: 115 LBS

## 2024-05-31 DIAGNOSIS — K57.90 DIVERTICULOSIS: ICD-10-CM

## 2024-05-31 DIAGNOSIS — R42 DIZZINESS: ICD-10-CM

## 2024-05-31 DIAGNOSIS — R11.2 NAUSEA AND VOMITING, UNSPECIFIED VOMITING TYPE: Primary | ICD-10-CM

## 2024-05-31 DIAGNOSIS — K52.9 GASTROENTERITIS: ICD-10-CM

## 2024-05-31 DIAGNOSIS — M54.50 LOW BACK PAIN WITHOUT SCIATICA, UNSPECIFIED BACK PAIN LATERALITY, UNSPECIFIED CHRONICITY: ICD-10-CM

## 2024-05-31 LAB
ALBUMIN SERPL-MCNC: 4.9 G/DL (ref 3.5–5.2)
ALP SERPL-CCNC: 54 U/L (ref 35–104)
ALT SERPL-CCNC: 10 U/L (ref 0–32)
AMYLASE SERPL-CCNC: 167 U/L (ref 20–100)
ANION GAP SERPL CALCULATED.3IONS-SCNC: 15 MMOL/L (ref 7–16)
APAP SERPL-MCNC: <5 UG/ML (ref 10–30)
AST SERPL-CCNC: 35 U/L (ref 0–31)
BACTERIA URNS QL MICRO: ABNORMAL
BILIRUB DIRECT SERPL-MCNC: 0.3 MG/DL (ref 0–0.3)
BILIRUB INDIRECT SERPL-MCNC: 1.3 MG/DL (ref 0–1)
BILIRUB SERPL-MCNC: 1.6 MG/DL (ref 0–1.2)
BILIRUB UR QL STRIP: NEGATIVE
BNP SERPL-MCNC: 2877 PG/ML (ref 0–125)
BUN SERPL-MCNC: 18 MG/DL (ref 6–23)
CALCIUM SERPL-MCNC: 12.4 MG/DL (ref 8.6–10.2)
CHLORIDE SERPL-SCNC: 97 MMOL/L (ref 98–107)
CK SERPL-CCNC: 121 U/L (ref 20–180)
CLARITY UR: CLEAR
CO2 SERPL-SCNC: 26 MMOL/L (ref 22–29)
COLOR UR: YELLOW
CREAT SERPL-MCNC: 0.7 MG/DL (ref 0.5–1)
CRP SERPL HS-MCNC: <3 MG/L (ref 0–5)
D-DIMER QUANTITATIVE: <200 NG/ML DDU (ref 0–230)
EPI CELLS #/AREA URNS HPF: ABNORMAL /HPF
ERYTHROCYTE [DISTWIDTH] IN BLOOD BY AUTOMATED COUNT: 12.4 % (ref 11.5–15)
ERYTHROCYTE [SEDIMENTATION RATE] IN BLOOD BY WESTERGREN METHOD: 6 MM/HR (ref 0–20)
ETHANOLAMINE SERPL-MCNC: <10 MG/DL (ref 0–0.08)
FLUAV RNA RESP QL NAA+PROBE: NOT DETECTED
FLUBV RNA RESP QL NAA+PROBE: NOT DETECTED
GFR, ESTIMATED: 88 ML/MIN/1.73M2
GLUCOSE SERPL-MCNC: 115 MG/DL (ref 74–99)
GLUCOSE UR STRIP-MCNC: NEGATIVE MG/DL
HCT VFR BLD AUTO: 48.2 % (ref 34–48)
HGB BLD-MCNC: 17.2 G/DL (ref 11.5–15.5)
HGB UR QL STRIP.AUTO: NEGATIVE
INR PPP: 1.1
KETONES UR STRIP-MCNC: NEGATIVE MG/DL
LACTATE BLDV-SCNC: 1.4 MMOL/L (ref 0.5–2.2)
LEUKOCYTE ESTERASE UR QL STRIP: NEGATIVE
LIPASE SERPL-CCNC: 57 U/L (ref 13–60)
MAGNESIUM SERPL-MCNC: 2.2 MG/DL (ref 1.6–2.6)
MCH RBC QN AUTO: 31.6 PG (ref 26–35)
MCHC RBC AUTO-ENTMCNC: 35.7 G/DL (ref 32–34.5)
MCV RBC AUTO: 88.4 FL (ref 80–99.9)
NITRITE UR QL STRIP: NEGATIVE
PH UR STRIP: 7 [PH] (ref 5–9)
PLATELET # BLD AUTO: 154 K/UL (ref 130–450)
PMV BLD AUTO: 10.6 FL (ref 7–12)
POTASSIUM SERPL-SCNC: 4.2 MMOL/L (ref 3.5–5)
PROT SERPL-MCNC: 8.7 G/DL (ref 6.4–8.3)
PROT UR STRIP-MCNC: 30 MG/DL
PROTHROMBIN TIME: 12.1 SEC (ref 9.3–12.4)
RBC # BLD AUTO: 5.45 M/UL (ref 3.5–5.5)
RBC #/AREA URNS HPF: ABNORMAL /HPF
RSV BY PCR: NOT DETECTED
SALICYLATES SERPL-MCNC: <0.3 MG/DL (ref 0–30)
SARS-COV-2 RNA RESP QL NAA+PROBE: NOT DETECTED
SODIUM SERPL-SCNC: 138 MMOL/L (ref 132–146)
SOURCE: NORMAL
SP GR UR STRIP: <1.005 (ref 1–1.03)
SPECIMEN DESCRIPTION: NORMAL
SPECIMEN SOURCE: NORMAL
T4 FREE SERPL-MCNC: 2 NG/DL (ref 0.9–1.7)
TOXIC TRICYCLIC SC,BLOOD: NEGATIVE
TROPONIN I SERPL HS-MCNC: 11 NG/L (ref 0–9)
TROPONIN I SERPL HS-MCNC: 11 NG/L (ref 0–9)
TSH SERPL DL<=0.05 MIU/L-ACNC: 0.77 UIU/ML (ref 0.27–4.2)
UROBILINOGEN UR STRIP-ACNC: 1 EU/DL (ref 0–1)
WBC #/AREA URNS HPF: ABNORMAL /HPF
WBC OTHER # BLD: 8.2 K/UL (ref 4.5–11.5)

## 2024-05-31 PROCEDURE — 84439 ASSAY OF FREE THYROXINE: CPT

## 2024-05-31 PROCEDURE — G0480 DRUG TEST DEF 1-7 CLASSES: HCPCS

## 2024-05-31 PROCEDURE — 72131 CT LUMBAR SPINE W/O DYE: CPT

## 2024-05-31 PROCEDURE — 6370000000 HC RX 637 (ALT 250 FOR IP): Performed by: EMERGENCY MEDICINE

## 2024-05-31 PROCEDURE — 99285 EMERGENCY DEPT VISIT HI MDM: CPT

## 2024-05-31 PROCEDURE — 83880 ASSAY OF NATRIURETIC PEPTIDE: CPT

## 2024-05-31 PROCEDURE — 80143 DRUG ASSAY ACETAMINOPHEN: CPT

## 2024-05-31 PROCEDURE — 84443 ASSAY THYROID STIM HORMONE: CPT

## 2024-05-31 PROCEDURE — 71275 CT ANGIOGRAPHY CHEST: CPT

## 2024-05-31 PROCEDURE — 85652 RBC SED RATE AUTOMATED: CPT

## 2024-05-31 PROCEDURE — 82550 ASSAY OF CK (CPK): CPT

## 2024-05-31 PROCEDURE — 6360000002 HC RX W HCPCS: Performed by: EMERGENCY MEDICINE

## 2024-05-31 PROCEDURE — 93005 ELECTROCARDIOGRAM TRACING: CPT | Performed by: EMERGENCY MEDICINE

## 2024-05-31 PROCEDURE — 86140 C-REACTIVE PROTEIN: CPT

## 2024-05-31 PROCEDURE — 85379 FIBRIN DEGRADATION QUANT: CPT

## 2024-05-31 PROCEDURE — 80053 COMPREHEN METABOLIC PANEL: CPT

## 2024-05-31 PROCEDURE — 6360000004 HC RX CONTRAST MEDICATION: Performed by: RADIOLOGY

## 2024-05-31 PROCEDURE — 72128 CT CHEST SPINE W/O DYE: CPT

## 2024-05-31 PROCEDURE — 2580000003 HC RX 258: Performed by: EMERGENCY MEDICINE

## 2024-05-31 PROCEDURE — 83735 ASSAY OF MAGNESIUM: CPT

## 2024-05-31 PROCEDURE — 83690 ASSAY OF LIPASE: CPT

## 2024-05-31 PROCEDURE — 81001 URINALYSIS AUTO W/SCOPE: CPT

## 2024-05-31 PROCEDURE — 82150 ASSAY OF AMYLASE: CPT

## 2024-05-31 PROCEDURE — 74176 CT ABD & PELVIS W/O CONTRAST: CPT

## 2024-05-31 PROCEDURE — 80307 DRUG TEST PRSMV CHEM ANLYZR: CPT

## 2024-05-31 PROCEDURE — 83605 ASSAY OF LACTIC ACID: CPT

## 2024-05-31 PROCEDURE — 87636 SARSCOV2 & INF A&B AMP PRB: CPT

## 2024-05-31 PROCEDURE — 85610 PROTHROMBIN TIME: CPT

## 2024-05-31 PROCEDURE — 84484 ASSAY OF TROPONIN QUANT: CPT

## 2024-05-31 PROCEDURE — 87634 RSV DNA/RNA AMP PROBE: CPT

## 2024-05-31 PROCEDURE — 80179 DRUG ASSAY SALICYLATE: CPT

## 2024-05-31 PROCEDURE — 70450 CT HEAD/BRAIN W/O DYE: CPT

## 2024-05-31 PROCEDURE — 82248 BILIRUBIN DIRECT: CPT

## 2024-05-31 PROCEDURE — 85027 COMPLETE CBC AUTOMATED: CPT

## 2024-05-31 RX ORDER — ONDANSETRON 4 MG/1
4 TABLET, FILM COATED ORAL EVERY 8 HOURS PRN
Qty: 12 TABLET | Refills: 0 | Status: SHIPPED | OUTPATIENT
Start: 2024-05-31

## 2024-05-31 RX ORDER — ONDANSETRON 2 MG/ML
4 INJECTION INTRAMUSCULAR; INTRAVENOUS ONCE
Status: DISCONTINUED | OUTPATIENT
Start: 2024-05-31 | End: 2024-06-01 | Stop reason: HOSPADM

## 2024-05-31 RX ORDER — OXYCODONE HYDROCHLORIDE AND ACETAMINOPHEN 5; 325 MG/1; MG/1
1 TABLET ORAL ONCE
Status: COMPLETED | OUTPATIENT
Start: 2024-05-31 | End: 2024-05-31

## 2024-05-31 RX ORDER — 0.9 % SODIUM CHLORIDE 0.9 %
1000 INTRAVENOUS SOLUTION INTRAVENOUS ONCE
Status: DISCONTINUED | OUTPATIENT
Start: 2024-05-31 | End: 2024-06-01 | Stop reason: HOSPADM

## 2024-05-31 RX ORDER — KETOROLAC TROMETHAMINE 30 MG/ML
15 INJECTION, SOLUTION INTRAMUSCULAR; INTRAVENOUS ONCE
Status: DISCONTINUED | OUTPATIENT
Start: 2024-05-31 | End: 2024-06-01 | Stop reason: HOSPADM

## 2024-05-31 RX ORDER — AMOXICILLIN AND CLAVULANATE POTASSIUM 875; 125 MG/1; MG/1
1 TABLET, FILM COATED ORAL ONCE
Status: COMPLETED | OUTPATIENT
Start: 2024-05-31 | End: 2024-05-31

## 2024-05-31 RX ORDER — AMOXICILLIN AND CLAVULANATE POTASSIUM 875; 125 MG/1; MG/1
1 TABLET, FILM COATED ORAL 2 TIMES DAILY
Qty: 20 TABLET | Refills: 0 | Status: SHIPPED | OUTPATIENT
Start: 2024-05-31 | End: 2024-06-10

## 2024-05-31 RX ORDER — MECLIZINE HCL 12.5 MG/1
25 TABLET ORAL ONCE
Status: COMPLETED | OUTPATIENT
Start: 2024-05-31 | End: 2024-05-31

## 2024-05-31 RX ORDER — OXYCODONE HYDROCHLORIDE AND ACETAMINOPHEN 5; 325 MG/1; MG/1
1 TABLET ORAL EVERY 6 HOURS PRN
Qty: 12 TABLET | Refills: 0 | Status: SHIPPED | OUTPATIENT
Start: 2024-05-31 | End: 2024-06-07

## 2024-05-31 RX ADMIN — OXYCODONE HYDROCHLORIDE AND ACETAMINOPHEN 1 TABLET: 5; 325 TABLET ORAL at 21:29

## 2024-05-31 RX ADMIN — MECLIZINE 25 MG: 12.5 TABLET ORAL at 20:29

## 2024-05-31 RX ADMIN — IOPAMIDOL 75 ML: 755 INJECTION, SOLUTION INTRAVENOUS at 20:04

## 2024-05-31 RX ADMIN — AMOXICILLIN AND CLAVULANATE POTASSIUM 1 TABLET: 875; 125 TABLET, FILM COATED ORAL at 21:29

## 2024-05-31 ASSESSMENT — PAIN DESCRIPTION - FREQUENCY: FREQUENCY: CONTINUOUS

## 2024-05-31 ASSESSMENT — PAIN DESCRIPTION - DESCRIPTORS: DESCRIPTORS: ACHING;CRAMPING;SORE

## 2024-05-31 ASSESSMENT — PAIN SCALES - GENERAL
PAINLEVEL_OUTOF10: 5
PAINLEVEL_OUTOF10: 5

## 2024-05-31 ASSESSMENT — PAIN DESCRIPTION - ORIENTATION: ORIENTATION: MID;UPPER

## 2024-05-31 ASSESSMENT — PAIN DESCRIPTION - LOCATION
LOCATION: GENERALIZED
LOCATION: BACK

## 2024-05-31 ASSESSMENT — LIFESTYLE VARIABLES
HOW OFTEN DO YOU HAVE A DRINK CONTAINING ALCOHOL: NEVER
HOW MANY STANDARD DRINKS CONTAINING ALCOHOL DO YOU HAVE ON A TYPICAL DAY: PATIENT DOES NOT DRINK

## 2024-05-31 ASSESSMENT — PAIN - FUNCTIONAL ASSESSMENT: PAIN_FUNCTIONAL_ASSESSMENT: 0-10

## 2024-05-31 ASSESSMENT — PAIN DESCRIPTION - ONSET: ONSET: ON-GOING

## 2024-05-31 ASSESSMENT — PAIN DESCRIPTION - PAIN TYPE: TYPE: ACUTE PAIN

## 2024-05-31 NOTE — ED NOTES
Name: Meghann Salamanca  : 1955  MRN: [id number]    Date: 2024    Benefits of immediately proceeding with Radiology exam outweigh the risks and therefore the following is being waived:      [x] Pregnancy test    [] Protocol for Iodine allergy    [] MRI questionnaire    [x] BUN/Creatinine        MD Maribel Kennedy Robert S, MD  [id number]

## 2024-05-31 NOTE — ED PROVIDER NOTES
HPI:  5/31/24, Time: 7:06 PM EDT         Meghann Salamanca is a 68 y.o. female presenting to the ED for week dizzy back pain vomiting diarrhea vertigo, beginning days ago.  The complaint has been persistent, moderate in severity, and worsened by nothing.  Patient denies any chest pain or shortness of breath is her vital signs are stable denies any alcohol use she does smoke cigarettes denies any illicit drug use she has not been bleeding she has been vomiting with diarrhea    ROS:   Pertinent positives and negatives are stated within HPI, all other systems reviewed and are negative.  --------------------------------------------- PAST HISTORY ---------------------------------------------  Past Medical History:  has a past medical history of HTN (hypertension).    Past Surgical History:  has a past surgical history that includes Cholecystectomy (2012); Partial hysterectomy (2001); Tonsillectomy; and Hemorrhoid surgery.    Social History:  reports that she has been smoking cigarettes. She has a 1.3 pack-year smoking history. She has never used smokeless tobacco. She reports current alcohol use. She reports that she does not use drugs.    Family History: family history includes COPD in her sister; Lung Cancer in her father; No Known Problems in her brother, brother, daughter, daughter, daughter, maternal grandfather, maternal grandmother, mother, paternal grandfather, paternal grandmother, sister, son, and son; Other in her sister.     The patient’s home medications have been reviewed.    Allergies: Morphine    ---------------------------------------------------PHYSICAL EXAM--------------------------------------  Constitutional/General: Alert and oriented x3, well appearing, non toxic in NAD  Head: Normocephalic and atraumatic  Eyes: PERRL, EOMI  Mouth: Oropharynx clear, handling secretions, no trismus  Neck: Supple, full ROM, non tender to palpation in the midline, no stridor, no crepitus, no meningeal signs  Pulmonary:

## 2024-06-01 LAB
AMPHET UR QL SCN: NEGATIVE
BARBITURATES UR QL SCN: NEGATIVE
BENZODIAZ UR QL: NEGATIVE
BUPRENORPHINE UR QL: NEGATIVE
CANNABINOIDS UR QL SCN: POSITIVE
COCAINE UR QL SCN: NEGATIVE
EKG ATRIAL RATE: 73 BPM
EKG P AXIS: 80 DEGREES
EKG P-R INTERVAL: 136 MS
EKG Q-T INTERVAL: 386 MS
EKG QRS DURATION: 84 MS
EKG QTC CALCULATION (BAZETT): 425 MS
EKG R AXIS: 20 DEGREES
EKG T AXIS: 57 DEGREES
EKG VENTRICULAR RATE: 73 BPM
FENTANYL UR QL: NEGATIVE
METHADONE UR QL: NEGATIVE
OPIATES UR QL SCN: NEGATIVE
OXYCODONE UR QL SCN: NEGATIVE
PCP UR QL SCN: NEGATIVE
TEST INFORMATION: ABNORMAL

## 2024-06-01 PROCEDURE — 93010 ELECTROCARDIOGRAM REPORT: CPT | Performed by: INTERNAL MEDICINE

## 2024-06-01 NOTE — DISCHARGE INSTRUCTIONS
Return if increased headache chest pain shortness of breath fevers vomiting or any bleeding or any neurological deficit

## 2024-06-18 ENCOUNTER — TRANSCRIBE ORDERS (OUTPATIENT)
Dept: ADMINISTRATIVE | Age: 69
End: 2024-06-18

## 2024-06-18 DIAGNOSIS — K74.60 HEPATIC CIRRHOSIS, UNSPECIFIED HEPATIC CIRRHOSIS TYPE, UNSPECIFIED WHETHER ASCITES PRESENT (HCC): Primary | ICD-10-CM

## 2024-06-18 DIAGNOSIS — Z86.19 HISTORY OF POSITIVE HEPATITIS C: ICD-10-CM

## 2024-06-20 ENCOUNTER — HOSPITAL ENCOUNTER (OUTPATIENT)
Age: 69
Discharge: HOME | End: 2024-06-20
Payer: MEDICARE

## 2024-06-20 LAB
ALBUMIN SERPL-MCNC: 4.4 G/DL (ref 3.5–5.2)
ALP SERPL-CCNC: 58 U/L (ref 35–104)
ALT SERPL-CCNC: 9 U/L (ref 0–32)
AMMONIA PLAS-SCNC: 16 UMOL/L (ref 11–51)
ANION GAP SERPL CALCULATED.3IONS-SCNC: 11 MMOL/L (ref 7–16)
AST SERPL-CCNC: 12 U/L (ref 0–31)
BASOPHILS # BLD: 0.01 K/UL (ref 0–0.2)
BASOPHILS NFR BLD: 0 % (ref 0–2)
BILIRUB SERPL-MCNC: 0.3 MG/DL (ref 0–1.2)
BUN SERPL-MCNC: 16 MG/DL (ref 6–23)
CALCIUM SERPL-MCNC: 11.7 MG/DL (ref 8.6–10.2)
CHLORIDE SERPL-SCNC: 107 MMOL/L (ref 98–107)
CO2 SERPL-SCNC: 25 MMOL/L (ref 22–29)
CREAT SERPL-MCNC: 1 MG/DL (ref 0.5–1)
EOSINOPHIL # BLD: 0.14 K/UL (ref 0.05–0.5)
EOSINOPHILS RELATIVE PERCENT: 3 % (ref 0–6)
ERYTHROCYTE [DISTWIDTH] IN BLOOD BY AUTOMATED COUNT: 12.6 % (ref 11.5–15)
FERRITIN SERPL-MCNC: 477 NG/ML
FOLATE SERPL-MCNC: 7.8 NG/ML (ref 4.8–24.2)
GFR, ESTIMATED: 62 ML/MIN/1.73M2
GLUCOSE SERPL-MCNC: 95 MG/DL (ref 74–99)
HCT VFR BLD AUTO: 41.2 % (ref 34–48)
HGB BLD-MCNC: 13.8 G/DL (ref 11.5–15.5)
IMM GRANULOCYTES # BLD AUTO: 0.04 K/UL (ref 0–0.58)
IMM GRANULOCYTES NFR BLD: 1 % (ref 0–5)
IRON SATN MFR SERPL: 25 % (ref 15–50)
IRON SERPL-MCNC: 66 UG/DL (ref 37–145)
LYMPHOCYTES NFR BLD: 1.49 K/UL (ref 1.5–4)
LYMPHOCYTES RELATIVE PERCENT: 28 % (ref 20–42)
MCH RBC QN AUTO: 32.5 PG (ref 26–35)
MCHC RBC AUTO-ENTMCNC: 33.5 G/DL (ref 32–34.5)
MCV RBC AUTO: 97.2 FL (ref 80–99.9)
MONOCYTES NFR BLD: 0.49 K/UL (ref 0.1–0.95)
MONOCYTES NFR BLD: 9 % (ref 2–12)
NEUTROPHILS NFR BLD: 59 % (ref 43–80)
NEUTS SEG NFR BLD: 3.18 K/UL (ref 1.8–7.3)
PLATELET, FLUORESCENCE: 134 K/UL (ref 130–450)
PMV BLD AUTO: 10.2 FL (ref 7–12)
POTASSIUM SERPL-SCNC: 3.4 MMOL/L (ref 3.5–5)
PROT SERPL-MCNC: 7.6 G/DL (ref 6.4–8.3)
RBC # BLD AUTO: 4.24 M/UL (ref 3.5–5.5)
SODIUM SERPL-SCNC: 143 MMOL/L (ref 132–146)
TIBC SERPL-MCNC: 262 UG/DL (ref 250–450)
VIT B12 SERPL-MCNC: 363 PG/ML (ref 211–946)
WBC OTHER # BLD: 5.4 K/UL (ref 4.5–11.5)

## 2024-06-20 PROCEDURE — 85025 COMPLETE CBC W/AUTO DIFF WBC: CPT

## 2024-06-20 PROCEDURE — 82728 ASSAY OF FERRITIN: CPT

## 2024-06-20 PROCEDURE — 82105 ALPHA-FETOPROTEIN SERUM: CPT

## 2024-06-20 PROCEDURE — 82140 ASSAY OF AMMONIA: CPT

## 2024-06-20 PROCEDURE — 83550 IRON BINDING TEST: CPT

## 2024-06-20 PROCEDURE — 82607 VITAMIN B-12: CPT

## 2024-06-20 PROCEDURE — 82746 ASSAY OF FOLIC ACID SERUM: CPT

## 2024-06-20 PROCEDURE — 83540 ASSAY OF IRON: CPT

## 2024-06-20 PROCEDURE — 80053 COMPREHEN METABOLIC PANEL: CPT

## 2024-06-20 PROCEDURE — 36415 COLL VENOUS BLD VENIPUNCTURE: CPT

## 2024-06-20 PROCEDURE — 87522 HEPATITIS C REVRS TRNSCRPJ: CPT

## 2024-06-21 LAB
AFP SERPL-MCNC: 7.2 UG/L
HCV RNA # SERPL NAA+PROBE: NOT DETECTED {COPIES}/ML
SPECIMEN SOURCE: NORMAL

## 2024-07-05 ENCOUNTER — HOSPITAL ENCOUNTER (OUTPATIENT)
Dept: ULTRASOUND IMAGING | Age: 69
End: 2024-07-05
Payer: MEDICARE

## 2024-07-05 DIAGNOSIS — K74.60 HEPATIC CIRRHOSIS, UNSPECIFIED HEPATIC CIRRHOSIS TYPE, UNSPECIFIED WHETHER ASCITES PRESENT (HCC): ICD-10-CM

## 2024-07-05 DIAGNOSIS — Z86.19 HISTORY OF POSITIVE HEPATITIS C: ICD-10-CM

## 2024-07-05 PROCEDURE — 76981 USE PARENCHYMA: CPT
